# Patient Record
Sex: FEMALE | Race: WHITE | NOT HISPANIC OR LATINO | Employment: OTHER | URBAN - METROPOLITAN AREA
[De-identification: names, ages, dates, MRNs, and addresses within clinical notes are randomized per-mention and may not be internally consistent; named-entity substitution may affect disease eponyms.]

---

## 2017-11-27 ENCOUNTER — TELEPHONE (OUTPATIENT)
Dept: INTERNAL MEDICINE | Facility: CLINIC | Age: 72
End: 2017-11-27

## 2017-11-27 DIAGNOSIS — Z12.39 BREAST CANCER SCREENING: Primary | ICD-10-CM

## 2017-11-28 ENCOUNTER — OFFICE VISIT (OUTPATIENT)
Dept: INTERNAL MEDICINE | Facility: CLINIC | Age: 72
End: 2017-11-28
Attending: INTERNAL MEDICINE
Payer: MEDICARE

## 2017-11-28 VITALS
WEIGHT: 124.75 LBS | HEART RATE: 62 BPM | DIASTOLIC BLOOD PRESSURE: 86 MMHG | OXYGEN SATURATION: 96 % | SYSTOLIC BLOOD PRESSURE: 136 MMHG | HEIGHT: 63 IN | BODY MASS INDEX: 22.11 KG/M2

## 2017-11-28 DIAGNOSIS — Z13.6 ENCOUNTER FOR LIPID SCREENING FOR CARDIOVASCULAR DISEASE: ICD-10-CM

## 2017-11-28 DIAGNOSIS — M85.80 OSTEOPENIA, UNSPECIFIED LOCATION: Primary | ICD-10-CM

## 2017-11-28 DIAGNOSIS — E55.9 VITAMIN D DEFICIENCY: ICD-10-CM

## 2017-11-28 DIAGNOSIS — L65.9 HAIR LOSS: ICD-10-CM

## 2017-11-28 DIAGNOSIS — Z13.220 ENCOUNTER FOR LIPID SCREENING FOR CARDIOVASCULAR DISEASE: ICD-10-CM

## 2017-11-28 DIAGNOSIS — D64.9 ANEMIA, UNSPECIFIED TYPE: ICD-10-CM

## 2017-11-28 DIAGNOSIS — Z23 NEED FOR PNEUMOCOCCAL VACCINATION: ICD-10-CM

## 2017-11-28 PROCEDURE — G0009 ADMIN PNEUMOCOCCAL VACCINE: HCPCS | Mod: S$GLB,,, | Performed by: INTERNAL MEDICINE

## 2017-11-28 PROCEDURE — 99214 OFFICE O/P EST MOD 30 MIN: CPT | Mod: S$GLB,,, | Performed by: INTERNAL MEDICINE

## 2017-11-28 PROCEDURE — 99999 PR PBB SHADOW E&M-EST. PATIENT-LVL III: CPT | Mod: PBBFAC,,, | Performed by: INTERNAL MEDICINE

## 2017-11-28 PROCEDURE — 90670 PCV13 VACCINE IM: CPT | Mod: S$GLB,,, | Performed by: INTERNAL MEDICINE

## 2017-11-28 NOTE — PROGRESS NOTES
"Patient was given vaccine information sheet for the Prbsilg85 (pneumococcal 13-valent conjugate) vaccine. The area of injection was palpated using the acromion process as a landmark. This area was cleaned with alcohol. Using a 25g 1" safety needle, 0.5mL of the vaccine was placed into the left deltoid muscle. The injection site was dressed with a bandage. Patient experienced no complications and was discharged in stable condition. [id number] (pneumococcal 13-valent conjugate) vaccine Lot: [id number] Exp: 09/2018    "

## 2017-11-28 NOTE — PATIENT INSTRUCTIONS
rec over the counter supplement of calcium 1200mg and vit d 1000u divided into 2 doses daily along with reg exercise

## 2017-11-28 NOTE — PROGRESS NOTES
"Subjective:   Patient ID: Nelsy Mckenna is a 72 y.o. female  Chief complaint:   Chief Complaint   Patient presents with    [facility]  Pt here to [facility] care and for annual exam. Prior pcp Dr. Anne-Marie mosher ordered through gyn - Neel Edwards and is utd  Had dexa this year ordered by gyn as well  Not taking calcium or vit d supplement currently but agrees to start. Is getting reg exercise.   No complaints today and doing well overall except notices some mild hair loss over the past few years. No constipation, dry brittle nails or hair, no dry skin or cold intolerance     Review of Systems   Constitutional: Negative for activity change and unexpected weight change.   HENT: Negative for hearing loss, rhinorrhea and trouble swallowing.    Eyes: Negative for discharge and visual disturbance.   Respiratory: Negative for chest tightness and wheezing.    Cardiovascular: Negative for chest pain and palpitations.   Gastrointestinal: Negative for blood in stool, constipation, diarrhea and vomiting.   Endocrine: Negative for polydipsia and polyuria.   Genitourinary: Negative for difficulty urinating, dysuria, hematuria and menstrual problem.   Musculoskeletal: Negative for arthralgias, joint swelling and neck pain.   Skin: Negative for color change and rash.   Neurological: Negative for weakness and headaches.   Psychiatric/Behavioral: Negative for confusion and dysphoric mood.       Objective:  Vitals:    11/28/17 1129   BP: 136/86   Pulse: 62   SpO2: 96%   Weight: 56.6 kg (124 lb 12.5 oz)   Height: 5' 3" (1.6 m)     Body mass index is 22.1 kg/m².    Physical Exam   Constitutional: She is oriented to person, place, and time. She appears well-developed and well-nourished.   HENT:   Head: Normocephalic and atraumatic.   Right Ear: External ear normal.   Left Ear: External ear normal.   Nose: Nose normal.   Mouth/Throat: Oropharynx is clear and moist. No oropharyngeal exudate.   No carotid bruits   Eyes: Conjunctivae " and EOM are normal.   Neck: Neck supple. No thyromegaly present.   Cardiovascular: Normal rate, regular rhythm, normal heart sounds and intact distal pulses.    Pulmonary/Chest: Effort normal and breath sounds normal.   Abdominal: Soft. Bowel sounds are normal.   Musculoskeletal: She exhibits no edema or tenderness.   Lymphadenopathy:     She has no cervical adenopathy.   Neurological: She is alert and oriented to person, place, and time.   Skin: Skin is warm and dry. Capillary refill takes less than 2 seconds.   Psychiatric: Her behavior is normal. Thought content normal.   Vitals reviewed.    Assessment:  1. Osteopenia, unspecified location    2. Vitamin D deficiency    3. Need for pneumococcal vaccination    4. Anemia, unspecified type    5. Encounter for lipid screening for cardiovascular disease    6. Hair loss        Plan:  Nelsy was seen today for establish care.    Diagnoses and all orders for this visit:    Osteopenia, unspecified location  -     Comprehensive metabolic panel; Future  -     Vitamin D; Future  -     TSH; Future  Pt to complete NU to get DEXA from 2017 from gyn   rec otc supplement of calcium 1200mg and vit d 800u divided into 2 doses daily along with reg exercise    Vitamin D deficiency: as above, check level  -     Comprehensive metabolic panel; Future  -     Vitamin D; Future  -     TSH; Future    Need for pneumococcal vaccination  -     (In Office Administered) Pneumococcal Conjugate Vaccine (13 Valent) (IM)    Anemia, unspecified type  -     CBC auto differential; Future  -     TSH; Future  cscope utd.   No gross bleeding - gi or gu    Encounter for lipid screening for cardiovascular disease  -     Lipid panel; Future    Hair loss  -     TSHl; Future    Health Maintenance   Topic Date Due    Pneumococcal (65+) (2 of 2 - PCV13) 10/31/2013    DEXA SCAN  05/05/2018    Colonoscopy  12/04/2018    Mammogram  08/16/2019    Lipid Panel  05/05/2020    TETANUS VACCINE  07/12/2023     Hepatitis C Screening  Completed    Zoster Vaccine  Completed    Influenza Vaccine  Completed

## 2017-12-11 ENCOUNTER — LAB VISIT (OUTPATIENT)
Dept: LAB | Facility: OTHER | Age: 72
End: 2017-12-11
Attending: INTERNAL MEDICINE
Payer: MEDICARE

## 2017-12-11 DIAGNOSIS — D64.9 ANEMIA, UNSPECIFIED TYPE: ICD-10-CM

## 2017-12-11 DIAGNOSIS — M85.80 OSTEOPENIA, UNSPECIFIED LOCATION: ICD-10-CM

## 2017-12-11 DIAGNOSIS — Z13.220 ENCOUNTER FOR LIPID SCREENING FOR CARDIOVASCULAR DISEASE: ICD-10-CM

## 2017-12-11 DIAGNOSIS — Z13.6 ENCOUNTER FOR LIPID SCREENING FOR CARDIOVASCULAR DISEASE: ICD-10-CM

## 2017-12-11 DIAGNOSIS — E55.9 VITAMIN D DEFICIENCY: ICD-10-CM

## 2017-12-11 DIAGNOSIS — L65.9 HAIR LOSS: ICD-10-CM

## 2017-12-11 LAB
25(OH)D3+25(OH)D2 SERPL-MCNC: 21 NG/ML
ALBUMIN SERPL BCP-MCNC: 3.6 G/DL
ALP SERPL-CCNC: 94 U/L
ALT SERPL W/O P-5'-P-CCNC: 14 U/L
ANION GAP SERPL CALC-SCNC: 6 MMOL/L
AST SERPL-CCNC: 21 U/L
BASOPHILS # BLD AUTO: 0.01 K/UL
BASOPHILS NFR BLD: 0.2 %
BILIRUB SERPL-MCNC: 0.6 MG/DL
BUN SERPL-MCNC: 28 MG/DL
CALCIUM SERPL-MCNC: 9.2 MG/DL
CHLORIDE SERPL-SCNC: 109 MMOL/L
CHOLEST SERPL-MCNC: 198 MG/DL
CHOLEST/HDLC SERPL: 2.4 {RATIO}
CO2 SERPL-SCNC: 27 MMOL/L
CREAT SERPL-MCNC: 1.3 MG/DL
DIFFERENTIAL METHOD: ABNORMAL
EOSINOPHIL # BLD AUTO: 0.2 K/UL
EOSINOPHIL NFR BLD: 3.9 %
ERYTHROCYTE [DISTWIDTH] IN BLOOD BY AUTOMATED COUNT: 12.9 %
EST. GFR  (AFRICAN AMERICAN): 48 ML/MIN/1.73 M^2
EST. GFR  (NON AFRICAN AMERICAN): 42 ML/MIN/1.73 M^2
GLUCOSE SERPL-MCNC: 99 MG/DL
HCT VFR BLD AUTO: 37.6 %
HDLC SERPL-MCNC: 82 MG/DL
HDLC SERPL: 41.4 %
HGB BLD-MCNC: 12.3 G/DL
LDLC SERPL CALC-MCNC: 100.2 MG/DL
LYMPHOCYTES # BLD AUTO: 1.1 K/UL
LYMPHOCYTES NFR BLD: 21.2 %
MCH RBC QN AUTO: 31.4 PG
MCHC RBC AUTO-ENTMCNC: 32.7 G/DL
MCV RBC AUTO: 96 FL
MONOCYTES # BLD AUTO: 0.4 K/UL
MONOCYTES NFR BLD: 6.7 %
NEUTROPHILS # BLD AUTO: 3.6 K/UL
NEUTROPHILS NFR BLD: 67.4 %
NONHDLC SERPL-MCNC: 116 MG/DL
PLATELET # BLD AUTO: 203 K/UL
PMV BLD AUTO: 10 FL
POTASSIUM SERPL-SCNC: 4.3 MMOL/L
PROT SERPL-MCNC: 6.9 G/DL
RBC # BLD AUTO: 3.92 M/UL
SODIUM SERPL-SCNC: 142 MMOL/L
TRIGL SERPL-MCNC: 79 MG/DL
TSH SERPL DL<=0.005 MIU/L-ACNC: 2.29 UIU/ML
WBC # BLD AUTO: 5.38 K/UL

## 2017-12-11 PROCEDURE — 36415 COLL VENOUS BLD VENIPUNCTURE: CPT

## 2017-12-11 PROCEDURE — 84443 ASSAY THYROID STIM HORMONE: CPT

## 2017-12-11 PROCEDURE — 85025 COMPLETE CBC W/AUTO DIFF WBC: CPT

## 2017-12-11 PROCEDURE — 80061 LIPID PANEL: CPT

## 2017-12-11 PROCEDURE — 80053 COMPREHEN METABOLIC PANEL: CPT

## 2017-12-11 PROCEDURE — 82306 VITAMIN D 25 HYDROXY: CPT

## 2017-12-13 ENCOUNTER — TELEPHONE (OUTPATIENT)
Dept: INTERNAL MEDICINE | Facility: CLINIC | Age: 72
End: 2017-12-13

## 2017-12-13 DIAGNOSIS — E55.9 VITAMIN D DEFICIENCY: Primary | ICD-10-CM

## 2017-12-13 DIAGNOSIS — N17.9 AKI (ACUTE KIDNEY INJURY): ICD-10-CM

## 2017-12-13 RX ORDER — ERGOCALCIFEROL 1.25 MG/1
50000 CAPSULE ORAL
Qty: 4 CAPSULE | Refills: 2 | Status: SHIPPED | OUTPATIENT
Start: 2017-12-13 | End: 2018-03-01

## 2017-12-13 NOTE — TELEPHONE ENCOUNTER
Message sent to pt via my chart with lab results and updates to plan.   Please schedule vit d lab in 3 months and BMP on Monday, Dec 18th. Thanks!

## 2017-12-13 NOTE — TELEPHONE ENCOUNTER
Nuno w/ pt and scheduled BMP lab for monday .  Pt states that she will call in 3 months to schedule vit D .  Pt has no further questions or concerns

## 2017-12-18 ENCOUNTER — LAB VISIT (OUTPATIENT)
Dept: LAB | Facility: OTHER | Age: 72
End: 2017-12-18
Attending: INTERNAL MEDICINE
Payer: MEDICARE

## 2017-12-18 DIAGNOSIS — N17.9 AKI (ACUTE KIDNEY INJURY): ICD-10-CM

## 2017-12-18 LAB
ANION GAP SERPL CALC-SCNC: 10 MMOL/L
BUN SERPL-MCNC: 24 MG/DL
CALCIUM SERPL-MCNC: 9.5 MG/DL
CHLORIDE SERPL-SCNC: 106 MMOL/L
CO2 SERPL-SCNC: 24 MMOL/L
CREAT SERPL-MCNC: 1 MG/DL
EST. GFR  (AFRICAN AMERICAN): >60 ML/MIN/1.73 M^2
EST. GFR  (NON AFRICAN AMERICAN): 57 ML/MIN/1.73 M^2
GLUCOSE SERPL-MCNC: 87 MG/DL
POTASSIUM SERPL-SCNC: 4.6 MMOL/L
SODIUM SERPL-SCNC: 140 MMOL/L

## 2017-12-18 PROCEDURE — 80048 BASIC METABOLIC PNL TOTAL CA: CPT

## 2017-12-18 PROCEDURE — 36415 COLL VENOUS BLD VENIPUNCTURE: CPT

## 2018-01-11 ENCOUNTER — OFFICE VISIT (OUTPATIENT)
Dept: URGENT CARE | Facility: CLINIC | Age: 73
End: 2018-01-11
Payer: MEDICARE

## 2018-01-11 VITALS
RESPIRATION RATE: 16 BRPM | SYSTOLIC BLOOD PRESSURE: 110 MMHG | TEMPERATURE: 98 F | HEIGHT: 63 IN | DIASTOLIC BLOOD PRESSURE: 80 MMHG | WEIGHT: 125 LBS | BODY MASS INDEX: 22.15 KG/M2 | HEART RATE: 80 BPM | OXYGEN SATURATION: 96 %

## 2018-01-11 DIAGNOSIS — R05.9 COUGH: ICD-10-CM

## 2018-01-11 DIAGNOSIS — R06.2 WHEEZING: ICD-10-CM

## 2018-01-11 DIAGNOSIS — J40 BRONCHITIS: Primary | ICD-10-CM

## 2018-01-11 LAB
CTP QC/QA: YES
FLUAV AG NPH QL: NEGATIVE
FLUBV AG NPH QL: NEGATIVE

## 2018-01-11 PROCEDURE — 99214 OFFICE O/P EST MOD 30 MIN: CPT | Mod: 25,S$GLB,, | Performed by: NURSE PRACTITIONER

## 2018-01-11 PROCEDURE — 87804 INFLUENZA ASSAY W/OPTIC: CPT | Mod: QW,S$GLB,, | Performed by: NURSE PRACTITIONER

## 2018-01-11 PROCEDURE — 94640 AIRWAY INHALATION TREATMENT: CPT | Mod: S$GLB,,, | Performed by: FAMILY MEDICINE

## 2018-01-11 RX ORDER — ALBUTEROL SULFATE 0.83 MG/ML
2.5 SOLUTION RESPIRATORY (INHALATION)
Status: COMPLETED | OUTPATIENT
Start: 2018-01-11 | End: 2018-01-11

## 2018-01-11 RX ORDER — IPRATROPIUM BROMIDE 0.5 MG/2.5ML
0.5 SOLUTION RESPIRATORY (INHALATION)
Status: COMPLETED | OUTPATIENT
Start: 2018-01-11 | End: 2018-01-11

## 2018-01-11 RX ORDER — ALBUTEROL SULFATE 90 UG/1
2 AEROSOL, METERED RESPIRATORY (INHALATION) EVERY 6 HOURS PRN
Qty: 1 INHALER | Refills: 0 | Status: SHIPPED | OUTPATIENT
Start: 2018-01-11 | End: 2018-03-13

## 2018-01-11 RX ADMIN — IPRATROPIUM BROMIDE 0.5 MG: 0.5 SOLUTION RESPIRATORY (INHALATION) at 09:01

## 2018-01-11 RX ADMIN — ALBUTEROL SULFATE 2.5 MG: 0.83 SOLUTION RESPIRATORY (INHALATION) at 09:01

## 2018-01-11 NOTE — PROGRESS NOTES
"Subjective:       Patient ID: Nelsy Mckenna is a 72 y.o. female.    Vitals:  height is 5' 3" (1.6 m) and weight is 56.7 kg (125 lb). Her temperature is 98.4 °F (36.9 °C). Her blood pressure is 110/80 and her pulse is 80. Her respiration is 16 and oxygen saturation is 96%.     Chief Complaint: Cough (dry cough that began 5 days ago)    Pt says she has had a dry cough for 5 days and a slight sore throat. Pt also says her lower back on the right side feels strained and the pain radiates to her pelvis into her groin area. Pt says she has body aches also. Pt took nyquil last night. Pt is a local retired [employer]    Patient also reports she feels "a little short of breath with coughing attacks".  SMR      Cough   This is a new problem. The current episode started in the past 7 days. The problem has been unchanged. The problem occurs hourly. The cough is non-productive. Associated symptoms include myalgias, nasal congestion, postnasal drip and a sore throat. Pertinent negatives include no chills or ear pain. The symptoms are aggravated by lying down. She has tried OTC cough suppressant for the symptoms. The treatment provided mild relief. There is no history of asthma, COPD or pneumonia.     Review of Systems   Constitution: Negative for chills and decreased appetite.   HENT: Positive for congestion, postnasal drip and sore throat. Negative for ear discharge and ear pain.    Respiratory: Positive for cough. Negative for sputum production.    Musculoskeletal: Positive for back pain and myalgias.   Genitourinary: Negative for pelvic pain.   Neurological: Positive for dizziness.       Objective:      Physical Exam   Constitutional: She is oriented to person, place, and time. Vital signs are normal. She appears well-developed and well-nourished. She is cooperative.  Non-toxic appearance. She does not appear ill. No distress.   HENT:   Head: Normocephalic and atraumatic.   Right Ear: Hearing, external ear and ear canal normal. " Tympanic membrane is bulging.   Left Ear: Hearing, external ear and ear canal normal. Tympanic membrane is bulging.   Nose: Mucosal edema present. No rhinorrhea or nasal deformity. No epistaxis. Right sinus exhibits no maxillary sinus tenderness and no frontal sinus tenderness. Left sinus exhibits no maxillary sinus tenderness and no frontal sinus tenderness.   Mouth/Throat: Uvula is midline and mucous membranes are normal. No trismus in the jaw. Normal dentition. No uvula swelling. Posterior oropharyngeal erythema (post nasal drainage noted) present.   Mild clear fluid noted behind both TMs   Eyes: Conjunctivae and lids are normal. No scleral icterus.   Sclera clear bilat   Neck: Trachea normal, normal range of motion, full passive range of motion without pain and phonation normal. Neck supple.   Cardiovascular: Normal rate, regular rhythm, normal heart sounds, intact distal pulses and normal pulses.    Pulmonary/Chest: Effort normal. No respiratory distress. She has wheezes in the right upper field and the left upper field.   Dry cough during exam, breath sounds slightly coarse mild with a very mild wheeze noted bilaterally to the upper lung fields.    Post nebulizer treatment, breath sounds are CTA bilaterally, no rhonchi or wheezing noted; breaths are unlabored and full throughout lung fields.     Abdominal: Soft. Normal appearance and bowel sounds are normal. She exhibits no distension. There is no tenderness.   Musculoskeletal: Normal range of motion. She exhibits no edema or deformity.   Neurological: She is alert and oriented to person, place, and time. She exhibits normal muscle tone. Coordination normal.   Skin: Skin is warm, dry and intact. She is not diaphoretic. No pallor.   Psychiatric: She has a normal mood and affect. Her speech is normal and behavior is normal. Judgment and thought content normal. Cognition and memory are normal.   Nursing note and vitals reviewed.      Type of Interpretation:  Radiology Verbal Report.  Radiology Procedure Done: AP & Lat CXR.  Interpretation:   Findings:  Lung volumes are normal and symmetric.  Mediastinal structures are midline allowing for thoracolumbar scoliosis.    I detect no convincing evidence of pulmonary disease, pleural fluid, lymph node enlargement, cardiac decompensation, pneumothorax, pneumomediastinum, pneumoperitoneum or significant osseous abnormality.  Impression       No convincing evidence of pneumonia or other intrathoracic disease identified in this patient with cough.          Office Visit on 01/11/2018   Component Date Value Ref Range Status    Rapid Influenza A Ag 01/11/2018 Negative  Negative Final    Rapid Influenza B Ag 01/11/2018 Negative  Negative Final     Acceptable 01/11/2018 Yes   Final     Assessment:       1. Bronchitis    2. Cough    3. Wheezing        Plan:         Bronchitis  -     albuterol 90 mcg/actuation inhaler; Inhale 2 puffs into the lungs every 6 (six) hours as needed for Wheezing. Rescue  Dispense: 1 Inhaler; Refill: 0    Cough  -     POCT INFLUENZA A/B  -     X-Ray Chest PA And Lateral; Future; Expected date: 01/11/2018    Wheezing  -     albuterol nebulizer solution 2.5 mg; Take 3 mLs (2.5 mg total) by nebulization one time.  -     ipratropium 0.02 % nebulizer solution 0.5 mg; Take 2.5 mLs (0.5 mg total) by nebulization one time.      Patient Instructions     Bronchitis  If your condition worsens or fails to improve we recommend that you receive another evaluation at the ER immediately or contact your PCP to discuss your concerns or return here. You must understand that you've received an urgent care treatment only and that you may be released before all your medical problems are known or treated. You the patient will arrange for follouwp care as instructed. .  Rest and fluids are important  Can use honey with joyce to soothe your throat  Take inhaler as prescribed and needed for wheezing  -  Flonase  "(fluticasone) is a nasal spray which is available over the counter and may help with your symptoms.   -  Zyrtec D, Claritin D or Allegra D can also help with symptoms of congestion and drainage.   -  If you have hypertension avoid using the "D" which is the decongestant.  Instead you can use Coricidin HBP for cold and cough symptoms.    -  If you just have drainage you can take plain Zyrtec, Claritin or Allegra   -  If you just have a congested feeling you can take pseudoephedrine (unless you have high blood pressure) which you have to sign for behind the counter. Do not buy the phenylephrine which is on the shelf as it is not effective   -  Rest and fluids are also important.   -  Tylenol or ibuprofen can also be used as directed for pain unless you have an allergy to them or medical condition such as stomach ulcers, kidney or liver disease or blood thinners etc for which you should not be taking these type of medications.   Please follow up with your primary care doctor or specialist in the next 48-72hrs as needed and if no improvement  If you  smoke, please stop smoking.                                                              What Is Acute Bronchitis?  Acute bronchitis is when the airways in your lungs (bronchial tubes) become red and swollen (inflamed). It is usually caused by a viral infection. But it can also occur because of a bacteria or allergen. Symptoms include a cough that produces yellow or greenish mucus and can last for days or sometimes weeks.  Inside healthy lungs    Air travels in and out of the lungs through the airways. The linings of these airways produce sticky mucus. This mucus traps particles that enter the lungs. Tiny structures called cilia then sweep the particles out of the airways.     Healthy airway: Airways are normally open. Air moves in and out easily.      Healthy cilia: Tiny, hairlike cilia sweep mucus and particles up and out of the airways.   Lungs with " bronchitis  Bronchitis often occurs with a cold or the flu virus. The airways become inflamed (red and swollen). There is a deep hacking cough from the extra mucus. Other symptoms may include:  · Wheezing  · Chest discomfort  · Shortness of breath  · Mild fever  A second infection, this time due to bacteria, may then occur. And airways irritated by allergens or smoke are more likely to get infected.        Inflamed airway: Inflammation and extra mucus narrow the airway, causing shortness of breath.      Impaired cilia: Extra mucus impairs cilia, causing congestion and wheezing. Smoking makes the problem worse.   Making a diagnosis  A physical exam, health history, and certain tests help your healthcare provider make the diagnosis.  Health history  Your healthcare provider will ask you about your symptoms.  The exam  Your provider listens to your chest for signs of congestion. He or she may also check your ears, nose, and throat.  Possible tests  · A sputum test for bacteria. This requires a sample of mucus from your lungs.  · A nasal or throat swab. This tests to see if you have a bacterial infection.  · A chest X-ray. This is done if your healthcare provider thinks you have pneumonia.  · Tests to check for an underlying condition. Other tests may be done to check for things such as allergies, asthma, or COPD (chronic obstructive pulmonary disease). You may need to see a specialist for more lung function testing.  Treating a cough  The main treatment for bronchitis is easing symptoms. Avoiding smoke, allergens, and other things that trigger coughing can often help. If the infection is bacterial, you may be given antibiotics. During the illness, it's important to get plenty of sleep. To ease symptoms:  · Dont smoke. Also avoid secondhand smoke.  · Use a humidifier. Or try breathing in steam from a hot shower. This may help loosen mucus.  · Drink a lot of water and juice. They can soothe the throat and may help thin  mucus.  · Sit up or use extra pillows when in bed. This helps to lessen coughing and congestion.  · Ask your provider about using medicine. Ask about using cough medicine, pain and fever medicine, or a decongestant.  Antibiotics  Most cases of bronchitis are caused by cold or flu viruses. They dont need antibiotics to treat them, even if your mucus is thick and green or yellow. Antibiotics dont treat viral illness and antibiotics have not been shown to have any benefit in cases of acute bronchitis. Taking antibiotics when they are not needed increases your risk of getting an infection later that is antibiotic-resistant. Antibiotics can also cause severe cases of diarrhea that require other antibiotics to treat.  It is important that you accept your healthcare provider's opinion to not use antibiotics. Your provider will prescribe antibiotics if the infection is caused by bacteria. If they are prescribed:  · Take all of the medicine. Take the medicine until it is used up, even if symptoms have improved. If you dont, the bronchitis may come back.  · Take the medicines as directed. For instance, some medicines should be taken with food.  · Ask about side effects. Ask your provider or pharmacist what side effects are common, and what to do about them.  Follow-up care  You should see your provider again in 2 to 3 weeks. By this time, symptoms should have improved. An infection that lasts longer may mean you have a more serious problem.  Prevention  · Avoid tobacco smoke. If you smoke, quit. Stay away from smoky places. Ask friends and family not to smoke around you, or in your home or car.  · Get checked for allergies.  · Ask your provider about getting a yearly flu shot. Also ask about pneumococcal or pneumonia shots.  · Wash your hands often. This helps reduce the chance of picking up viruses that cause colds and flu.  Call your healthcare provider if:  · Symptoms worsen, or you have new symptoms  · Breathing  problems worsen or  become severe  · Symptoms dont get better within a week, or within 3 days of taking antibiotics   Date Last Reviewed: 2/1/2017 © 2000-2017 [employer]. [address] [place] [address], [place] [address]. All rights reserved. This information is not intended as a substitute for professional medical care. Always follow your healthcare professional's instructions.        Bronchitis with Wheezing (Viral or Bacterial: Adult)    Bronchitis is an infection of the air passages. It often occurs during a cold and is usually caused by a virus. Symptoms include cough with mucus (phlegm) and low-grade fever. This illness is contagious during the first few days and is spread through the air by coughing and sneezing, or by direct contact (touching the sick person and then touching your own eyes, nose, or mouth).  If there is a lot of inflammation, air flow is restricted. The air passages may also go into spasm, especially if you have asthma. This causes wheezing and difficulty breathing even in people who do not have asthma.  Bronchitis usually lasts 7 to 14 days. The wheezing should improve with treatment during the first week. An inhaler is often prescribed to relax the air passages and stop wheezing. Antibiotics will be prescribed if your doctor thinks there is also a secondary bacterial infection.  Home care  · If symptoms are severe, rest at home for the first 2 to 3 days. When you go back to your usual activities, don't let yourself get too tired.  · Do not smoke. Also avoid being exposed to secondhand smoke.  · You may use over-the-counter medicine to control fever or pain, unless another medicine was prescribed. Note: If you have chronic liver or kidney disease or have ever had a stomach ulcer or gastrointestinal bleeding, talk with your healthcare provider before using these medicines. Also talk to your provider if you are taking medicine to prevent blood clots.) Aspirin should never be given to  anyone younger than 18 years of age who is ill with a viral infection or fever. It may cause severe liver or brain damage.  · Your appetite may be poor, so a light diet is fine. Avoid dehydration by drinking 6 to 8 glasses of fluids per day (such as water, soft drinks, sports drinks, juices, tea, or soup). Extra fluids will help loosen secretions in the nose and lungs.  · Over-the-counter cough, cold, and sore-throat medicines will not shorten the length of the illness, but they may be helpful to reduce symptoms. (Note: Do not use decongestants if you have high blood pressure.)  · If you were given an inhaler, use it exactly as directed. If you need to use it more often than prescribed, your condition may be worsening. If this happens, contact your healthcare provider.  · If prescribed, finish all antibiotic medicine, even if you are feeling better after only a few days.  Follow-up care  Follow up with your healthcare provider, or as advised. If you had an X-ray or ECG (electrocardiogram), a specialist will review it. You will be notified of any new findings that may affect your care.  Note: If you are age 65 or older, or if you have a chronic lung disease or condition that affects your immune system, or you smoke, talk to your healthcare provider about having a pneumococcal vaccinations and a yearly influenza vaccination (flu shot).  When to seek medical advice  Call your healthcare provider right away if any of these occur:  · Fever of 100.4°F (38°C) or higher  · Coughing up increasing amounts of colored sputum  · Weakness, drowsiness, headache, facial pain, ear pain, or a stiff neck  Call 911, or get immediate medical care  Contact emergency services right away if any of these occur.  · Coughing up blood  · Worsening weakness, drowsiness, headache, or stiff neck  · Increased wheezing not helped with medication, shortness of breath, or pain with breathing  Date Last Reviewed: 9/13/2015  © 3032-3747 [employer]. [address] [place] [address], [place] [address]. All rights reserved. This information is not intended as a substitute for professional medical care. Always follow your healthcare professional's instructions.

## 2018-01-11 NOTE — PATIENT INSTRUCTIONS
"Bronchitis  If your condition worsens or fails to improve we recommend that you receive another evaluation at the ER immediately or contact your PCP to discuss your concerns or return here. You must understand that you've received an urgent care treatment only and that you may be released before all your medical problems are known or treated. You the patient will arrange for follouwp care as instructed. .  Rest and fluids are important  Can use honey with joyce to soothe your throat  Take inhaler as prescribed and needed for wheezing  -  Flonase (fluticasone) is a nasal spray which is available over the counter and may help with your symptoms.   -  Zyrtec D, Claritin D or Allegra D can also help with symptoms of congestion and drainage.   -  If you have hypertension avoid using the "D" which is the decongestant.  Instead you can use Coricidin HBP for cold and cough symptoms.    -  If you just have drainage you can take plain Zyrtec, Claritin or Allegra   -  If you just have a congested feeling you can take pseudoephedrine (unless you have high blood pressure) which you have to sign for behind the counter. Do not buy the phenylephrine which is on the shelf as it is not effective   -  Rest and fluids are also important.   -  Tylenol or ibuprofen can also be used as directed for pain unless you have an allergy to them or medical condition such as stomach ulcers, kidney or liver disease or blood thinners etc for which you should not be taking these type of medications.   Please follow up with your primary care doctor or specialist in the next 48-72hrs as needed and if no improvement  If you  smoke, please stop smoking.                                                              What Is Acute Bronchitis?  Acute bronchitis is when the airways in your lungs (bronchial tubes) become red and swollen (inflamed). It is usually caused by a viral infection. But it can also occur because of a bacteria or allergen. Symptoms " include a cough that produces yellow or greenish mucus and can last for days or sometimes weeks.  Inside healthy lungs    Air travels in and out of the lungs through the airways. The linings of these airways produce sticky mucus. This mucus traps particles that enter the lungs. Tiny structures called cilia then sweep the particles out of the airways.     Healthy airway: Airways are normally open. Air moves in and out easily.      Healthy cilia: Tiny, hairlike cilia sweep mucus and particles up and out of the airways.   Lungs with bronchitis  Bronchitis often occurs with a cold or the flu virus. The airways become inflamed (red and swollen). There is a deep hacking cough from the extra mucus. Other symptoms may include:  · Wheezing  · Chest discomfort  · Shortness of breath  · Mild fever  A second infection, this time due to bacteria, may then occur. And airways irritated by allergens or smoke are more likely to get infected.        Inflamed airway: Inflammation and extra mucus narrow the airway, causing shortness of breath.      Impaired cilia: Extra mucus impairs cilia, causing congestion and wheezing. Smoking makes the problem worse.   Making a diagnosis  A physical exam, health history, and certain tests help your healthcare provider make the diagnosis.  Health history  Your healthcare provider will ask you about your symptoms.  The exam  Your provider listens to your chest for signs of congestion. He or she may also check your ears, nose, and throat.  Possible tests  · A sputum test for bacteria. This requires a sample of mucus from your lungs.  · A nasal or throat swab. This tests to see if you have a bacterial infection.  · A chest X-ray. This is done if your healthcare provider thinks you have pneumonia.  · Tests to check for an underlying condition. Other tests may be done to check for things such as allergies, asthma, or COPD (chronic obstructive pulmonary disease). You may need to see a specialist for more  lung function testing.  Treating a cough  The main treatment for bronchitis is easing symptoms. Avoiding smoke, allergens, and other things that trigger coughing can often help. If the infection is bacterial, you may be given antibiotics. During the illness, it's important to get plenty of sleep. To ease symptoms:  · Dont smoke. Also avoid secondhand smoke.  · Use a humidifier. Or try breathing in steam from a hot shower. This may help loosen mucus.  · Drink a lot of water and juice. They can soothe the throat and may help thin mucus.  · Sit up or use extra pillows when in bed. This helps to lessen coughing and congestion.  · Ask your provider about using medicine. Ask about using cough medicine, pain and fever medicine, or a decongestant.  Antibiotics  Most cases of bronchitis are caused by cold or flu viruses. They dont need antibiotics to treat them, even if your mucus is thick and green or yellow. Antibiotics dont treat viral illness and antibiotics have not been shown to have any benefit in cases of acute bronchitis. Taking antibiotics when they are not needed increases your risk of getting an infection later that is antibiotic-resistant. Antibiotics can also cause severe cases of diarrhea that require other antibiotics to treat.  It is important that you accept your healthcare provider's opinion to not use antibiotics. Your provider will prescribe antibiotics if the infection is caused by bacteria. If they are prescribed:  · Take all of the medicine. Take the medicine until it is used up, even if symptoms have improved. If you dont, the bronchitis may come back.  · Take the medicines as directed. For instance, some medicines should be taken with food.  · Ask about side effects. Ask your provider or pharmacist what side effects are common, and what to do about them.  Follow-up care  You should see your provider again in 2 to 3 weeks. By this time, symptoms should have improved. An infection that lasts  longer may mean you have a more serious problem.  Prevention  · Avoid tobacco smoke. If you smoke, quit. Stay away from smoky places. Ask friends and family not to smoke around you, or in your home or car.  · Get checked for allergies.  · Ask your provider about getting a yearly flu shot. Also ask about pneumococcal or pneumonia shots.  · Wash your hands often. This helps reduce the chance of picking up viruses that cause colds and flu.  Call your healthcare provider if:  · Symptoms worsen, or you have new symptoms  · Breathing problems worsen or  become severe  · Symptoms dont get better within a week, or within 3 days of taking antibiotics   Date Last Reviewed: 2/1/2017  © 3047-1348 [employer]. [address] [place] [address], [place] [address]. All rights reserved. This information is not intended as a substitute for professional medical care. Always follow your healthcare professional's instructions.        Bronchitis with Wheezing (Viral or Bacterial: Adult)    Bronchitis is an infection of the air passages. It often occurs during a cold and is usually caused by a virus. Symptoms include cough with mucus (phlegm) and low-grade fever. This illness is contagious during the first few days and is spread through the air by coughing and sneezing, or by direct contact (touching the sick person and then touching your own eyes, nose, or mouth).  If there is a lot of inflammation, air flow is restricted. The air passages may also go into spasm, especially if you have asthma. This causes wheezing and difficulty breathing even in people who do not have asthma.  Bronchitis usually lasts 7 to 14 days. The wheezing should improve with treatment during the first week. An inhaler is often prescribed to relax the air passages and stop wheezing. Antibiotics will be prescribed if your doctor thinks there is also a secondary bacterial infection.  Home care  · If symptoms are severe, rest at home for the first 2 to 3 days.  When you go back to your usual activities, don't let yourself get too tired.  · Do not smoke. Also avoid being exposed to secondhand smoke.  · You may use over-the-counter medicine to control fever or pain, unless another medicine was prescribed. Note: If you have chronic liver or kidney disease or have ever had a stomach ulcer or gastrointestinal bleeding, talk with your healthcare provider before using these medicines. Also talk to your provider if you are taking medicine to prevent blood clots.) Aspirin should never be given to anyone younger than 18 years of age who is ill with a viral infection or fever. It may cause severe liver or brain damage.  · Your appetite may be poor, so a light diet is fine. Avoid dehydration by drinking 6 to 8 glasses of fluids per day (such as water, soft drinks, sports drinks, juices, tea, or soup). Extra fluids will help loosen secretions in the nose and lungs.  · Over-the-counter cough, cold, and sore-throat medicines will not shorten the length of the illness, but they may be helpful to reduce symptoms. (Note: Do not use decongestants if you have high blood pressure.)  · If you were given an inhaler, use it exactly as directed. If you need to use it more often than prescribed, your condition may be worsening. If this happens, contact your healthcare provider.  · If prescribed, finish all antibiotic medicine, even if you are feeling better after only a few days.  Follow-up care  Follow up with your healthcare provider, or as advised. If you had an X-ray or ECG (electrocardiogram), a specialist will review it. You will be notified of any new findings that may affect your care.  Note: If you are age 65 or older, or if you have a chronic lung disease or condition that affects your immune system, or you smoke, talk to your healthcare provider about having a pneumococcal vaccinations and a yearly influenza vaccination (flu shot).  When to seek medical advice  Call your healthcare  provider right away if any of these occur:  · Fever of 100.4°F (38°C) or higher  · Coughing up increasing amounts of colored sputum  · Weakness, drowsiness, headache, facial pain, ear pain, or a stiff neck  Call 911, or get immediate medical care  Contact emergency services right away if any of these occur.  · Coughing up blood  · Worsening weakness, drowsiness, headache, or stiff neck  · Increased wheezing not helped with medication, shortness of breath, or pain with breathing  Date Last Reviewed: 9/13/2015  © 2539-9252 [employer]. [address] [place] [address]. All rights reserved. This information is not intended as a substitute for professional medical care. Always follow your healthcare professional's instructions.

## 2018-02-07 ENCOUNTER — OFFICE VISIT (OUTPATIENT)
Dept: OPTOMETRY | Facility: CLINIC | Age: 73
End: 2018-02-07
Payer: MEDICARE

## 2018-02-07 DIAGNOSIS — H52.4 MYOPIA WITH PRESBYOPIA, BILATERAL: ICD-10-CM

## 2018-02-07 DIAGNOSIS — H25.13 NUCLEAR SCLEROSIS, BILATERAL: Primary | ICD-10-CM

## 2018-02-07 DIAGNOSIS — H52.4 MYOPIA WITH PRESBYOPIA, BILATERAL: Primary | ICD-10-CM

## 2018-02-07 DIAGNOSIS — H52.13 MYOPIA WITH PRESBYOPIA, BILATERAL: ICD-10-CM

## 2018-02-07 DIAGNOSIS — H52.13 MYOPIA WITH PRESBYOPIA, BILATERAL: Primary | ICD-10-CM

## 2018-02-07 PROCEDURE — 99499 UNLISTED E&M SERVICE: CPT | Mod: S$GLB,,, | Performed by: OPTOMETRIST

## 2018-02-07 PROCEDURE — 92014 COMPRE OPH EXAM EST PT 1/>: CPT | Mod: S$GLB,,, | Performed by: OPTOMETRIST

## 2018-02-07 PROCEDURE — 99999 PR PBB SHADOW E&M-EST. PATIENT-LVL II: CPT | Mod: PBBFAC,,, | Performed by: OPTOMETRIST

## 2018-02-07 PROCEDURE — 92310 CONTACT LENS FITTING OU: CPT | Mod: ,,, | Performed by: OPTOMETRIST

## 2018-02-07 RX ORDER — TRIAMCINOLONE ACETONIDE 1 MG/G
OINTMENT TOPICAL
COMMUNITY
Start: 2018-01-24 | End: 2018-03-13

## 2018-02-07 RX ORDER — KETOCONAZOLE 20 MG/G
CREAM TOPICAL
COMMUNITY
Start: 2018-01-24 | End: 2018-03-13

## 2018-02-07 NOTE — PROGRESS NOTES
HPI     Patient's last dilated exam was: 8/10/2016    Pt here for routine exam for glasses and ctl rx. C/o blurred vision for   reading and needing more light to see clearly. Patient denies  pain and   double vision. Occasional flash, unsure which eye, has been going on for   years. Sees apprx once every couple months, floaters OU longstanding and   stable.No longer drives at night if she does not have to due to glare,   feels lights scatter or starburst at night    Last edited by Nyasia Vieyra, PCT on 2/7/2018 11:14 AM.   (History)            Assessment /Plan     For exam results, see Encounter Report.    Nuclear sclerosis, bilateral    Myopia with presbyopia, bilateral            1.  Educated on cataracts and affects on vision.  Patient unhappy with vision.  Consult Dr. Schmid for cataract surgery.  2.  Contact lens rx given.  Only fill if waiting on surgery. Eye health normal OU.

## 2018-02-09 ENCOUNTER — TELEPHONE (OUTPATIENT)
Dept: OPTOMETRY | Facility: CLINIC | Age: 73
End: 2018-02-09

## 2018-03-13 ENCOUNTER — OFFICE VISIT (OUTPATIENT)
Dept: OPHTHALMOLOGY | Facility: CLINIC | Age: 73
End: 2018-03-13
Attending: OPHTHALMOLOGY
Payer: MEDICARE

## 2018-03-13 DIAGNOSIS — H25.13 NUCLEAR SCLEROSIS, BILATERAL: Primary | ICD-10-CM

## 2018-03-13 DIAGNOSIS — Z98.890 S/P LASIK (LASER ASSISTED IN SITU KERATOMILEUSIS): ICD-10-CM

## 2018-03-13 PROCEDURE — 99999 PR PBB SHADOW E&M-EST. PATIENT-LVL II: CPT | Mod: PBBFAC,,, | Performed by: OPHTHALMOLOGY

## 2018-03-13 PROCEDURE — 92014 COMPRE OPH EXAM EST PT 1/>: CPT | Mod: S$GLB,,, | Performed by: OPHTHALMOLOGY

## 2018-03-13 NOTE — PROGRESS NOTES
HPI     Referred by Dr Staley    S/p Lasik OU x 15 years ago  No eyedrops    Pt referred by Dr Staley for cataract eval. Pt states blurry vision when the   light isn't bright enough.  Pt states she has trouble driving at night.      Last edited by Pauly Zhao MA on 3/13/2018  3:09 PM.   (History)            Assessment /Plan     For exam results, see Encounter Report.    Nuclear sclerosis, bilateral    S/P LASIK (laser assisted in situ keratomileusis)      Hx lasik with induced corneal cyl OU.  Visually Significant Cataract: Patient reports decreased vision consistent with the clinical amount of lenticular opacity, which reaches the level of visual significance and affects activities of daily living. Risks, benefits, and alternatives to cataract surgery were discussed and the consent reviewed. IOL options were discussed, including ATIOLs and the associated side effects and additional patient cost associated with them.   IOL Selections:   Right eye  IOL: pcboo 16.5 (aiming -1.00 for plano target)     Left eye  IOL: pcboo 16.5  (aiming -1.00 for plano target)    Pt wishes to have either eye done first.  Will monitor for now.

## 2018-03-16 ENCOUNTER — TELEPHONE (OUTPATIENT)
Dept: OPTOMETRY | Facility: CLINIC | Age: 73
End: 2018-03-16

## 2018-06-08 ENCOUNTER — HOSPITAL ENCOUNTER (INPATIENT)
Facility: OTHER | Age: 73
LOS: 1 days | Discharge: HOME | DRG: 604 | End: 2018-06-09
Attending: EMERGENCY MEDICINE | Admitting: HOSPITALIST
Payer: MEDICARE

## 2018-06-08 ENCOUNTER — OFFICE VISIT (OUTPATIENT)
Dept: INTERNAL MEDICINE | Facility: CLINIC | Age: 73
DRG: 604 | End: 2018-06-08
Attending: FAMILY MEDICINE
Payer: MEDICARE

## 2018-06-08 VITALS
HEART RATE: 92 BPM | SYSTOLIC BLOOD PRESSURE: 136 MMHG | OXYGEN SATURATION: 83 % | BODY MASS INDEX: 23.48 KG/M2 | WEIGHT: 132.5 LBS | HEIGHT: 63 IN | DIASTOLIC BLOOD PRESSURE: 78 MMHG

## 2018-06-08 DIAGNOSIS — J96.01 ACUTE HYPOXEMIC RESPIRATORY FAILURE: ICD-10-CM

## 2018-06-08 DIAGNOSIS — R09.02 HYPOXIA: ICD-10-CM

## 2018-06-08 DIAGNOSIS — S20.219A CHEST WALL CONTUSION: ICD-10-CM

## 2018-06-08 DIAGNOSIS — R31.9 HEMATURIA, UNSPECIFIED TYPE: ICD-10-CM

## 2018-06-08 DIAGNOSIS — S20.212D CHEST WALL CONTUSION, LEFT, SUBSEQUENT ENCOUNTER: Primary | ICD-10-CM

## 2018-06-08 DIAGNOSIS — R79.89 ELEVATED LFTS: ICD-10-CM

## 2018-06-08 DIAGNOSIS — R06.00 DYSPNEA, UNSPECIFIED TYPE: ICD-10-CM

## 2018-06-08 DIAGNOSIS — R07.9 CHEST PAIN: ICD-10-CM

## 2018-06-08 DIAGNOSIS — S20.219D CONTUSION OF CHEST WALL, UNSPECIFIED LATERALITY, SUBSEQUENT ENCOUNTER: ICD-10-CM

## 2018-06-08 DIAGNOSIS — J98.11 ATELECTASIS: ICD-10-CM

## 2018-06-08 DIAGNOSIS — S29.9XXD TRAUMA OF CHEST, SUBSEQUENT ENCOUNTER: ICD-10-CM

## 2018-06-08 DIAGNOSIS — Q61.3 POLYCYSTIC KIDNEY DISEASE: Primary | ICD-10-CM

## 2018-06-08 PROBLEM — N30.00 ACUTE CYSTITIS WITHOUT HEMATURIA: Status: ACTIVE | Noted: 2018-06-08

## 2018-06-08 LAB
ALBUMIN SERPL BCP-MCNC: 3.7 G/DL
ALP SERPL-CCNC: 100 U/L
ALT SERPL W/O P-5'-P-CCNC: 21 U/L
ANION GAP SERPL CALC-SCNC: 10 MMOL/L
AST SERPL-CCNC: 33 U/L
BACTERIA #/AREA URNS HPF: ABNORMAL /HPF
BASOPHILS # BLD AUTO: 0.01 K/UL
BASOPHILS NFR BLD: 0.1 %
BILIRUB SERPL-MCNC: 0.6 MG/DL
BILIRUB UR QL STRIP: NEGATIVE
BNP SERPL-MCNC: 24 PG/ML
BUN SERPL-MCNC: 18 MG/DL
CALCIUM SERPL-MCNC: 9.6 MG/DL
CHLORIDE SERPL-SCNC: 104 MMOL/L
CLARITY UR: CLEAR
CO2 SERPL-SCNC: 24 MMOL/L
COLOR UR: YELLOW
CREAT SERPL-MCNC: 1.1 MG/DL
DIFFERENTIAL METHOD: ABNORMAL
EOSINOPHIL # BLD AUTO: 0.1 K/UL
EOSINOPHIL NFR BLD: 1.3 %
ERYTHROCYTE [DISTWIDTH] IN BLOOD BY AUTOMATED COUNT: 12.3 %
EST. GFR  (AFRICAN AMERICAN): 59 ML/MIN/1.73 M^2
EST. GFR  (NON AFRICAN AMERICAN): 51 ML/MIN/1.73 M^2
GLUCOSE SERPL-MCNC: 108 MG/DL
GLUCOSE UR QL STRIP: NEGATIVE
HCT VFR BLD AUTO: 35.4 %
HGB BLD-MCNC: 11.4 G/DL
HGB UR QL STRIP: ABNORMAL
INR PPP: 0.8
KETONES UR QL STRIP: NEGATIVE
LEUKOCYTE ESTERASE UR QL STRIP: ABNORMAL
LYMPHOCYTES # BLD AUTO: 1.3 K/UL
LYMPHOCYTES NFR BLD: 18.2 %
MCH RBC QN AUTO: 31.2 PG
MCHC RBC AUTO-ENTMCNC: 32.2 G/DL
MCV RBC AUTO: 97 FL
MICROSCOPIC COMMENT: ABNORMAL
MONOCYTES # BLD AUTO: 0.4 K/UL
MONOCYTES NFR BLD: 5.4 %
NEUTROPHILS # BLD AUTO: 5.2 K/UL
NEUTROPHILS NFR BLD: 74.6 %
NITRITE UR QL STRIP: NEGATIVE
PH UR STRIP: 6 [PH] (ref 5–8)
PLATELET # BLD AUTO: 135 K/UL
PMV BLD AUTO: 9.6 FL
POTASSIUM SERPL-SCNC: 4.4 MMOL/L
PROT SERPL-MCNC: 6.9 G/DL
PROT UR QL STRIP: NEGATIVE
PROTHROMBIN TIME: 9.4 SEC
RBC # BLD AUTO: 3.65 M/UL
RBC #/AREA URNS HPF: 6 /HPF (ref 0–4)
SODIUM SERPL-SCNC: 138 MMOL/L
SP GR UR STRIP: 1.01 (ref 1–1.03)
SQUAMOUS #/AREA URNS HPF: 3 /HPF
TROPONIN I SERPL DL<=0.01 NG/ML-MCNC: 0.01 NG/ML
URN SPEC COLLECT METH UR: ABNORMAL
UROBILINOGEN UR STRIP-ACNC: NEGATIVE EU/DL
WBC # BLD AUTO: 7.03 K/UL
WBC #/AREA URNS HPF: 73 /HPF (ref 0–5)
WBC CLUMPS URNS QL MICRO: ABNORMAL

## 2018-06-08 PROCEDURE — 81000 URINALYSIS NONAUTO W/SCOPE: CPT

## 2018-06-08 PROCEDURE — 96374 THER/PROPH/DIAG INJ IV PUSH: CPT

## 2018-06-08 PROCEDURE — 85610 PROTHROMBIN TIME: CPT

## 2018-06-08 PROCEDURE — 99285 EMERGENCY DEPT VISIT HI MDM: CPT | Mod: 25

## 2018-06-08 PROCEDURE — 99215 OFFICE O/P EST HI 40 MIN: CPT | Mod: S$GLB,,, | Performed by: FAMILY MEDICINE

## 2018-06-08 PROCEDURE — 63600175 PHARM REV CODE 636 W HCPCS: Performed by: EMERGENCY MEDICINE

## 2018-06-08 PROCEDURE — 94761 N-INVAS EAR/PLS OXIMETRY MLT: CPT

## 2018-06-08 PROCEDURE — 93010 ELECTROCARDIOGRAM REPORT: CPT | Mod: ,,, | Performed by: INTERNAL MEDICINE

## 2018-06-08 PROCEDURE — 94799 UNLISTED PULMONARY SVC/PX: CPT

## 2018-06-08 PROCEDURE — 83880 ASSAY OF NATRIURETIC PEPTIDE: CPT

## 2018-06-08 PROCEDURE — 27000221 HC OXYGEN, UP TO 24 HOURS

## 2018-06-08 PROCEDURE — 96375 TX/PRO/DX INJ NEW DRUG ADDON: CPT

## 2018-06-08 PROCEDURE — 93005 ELECTROCARDIOGRAM TRACING: CPT

## 2018-06-08 PROCEDURE — 80053 COMPREHEN METABOLIC PANEL: CPT

## 2018-06-08 PROCEDURE — 25000003 PHARM REV CODE 250: Performed by: HOSPITALIST

## 2018-06-08 PROCEDURE — 85025 COMPLETE CBC W/AUTO DIFF WBC: CPT

## 2018-06-08 PROCEDURE — 99222 1ST HOSP IP/OBS MODERATE 55: CPT | Mod: AI,,, | Performed by: HOSPITALIST

## 2018-06-08 PROCEDURE — 63600175 PHARM REV CODE 636 W HCPCS: Performed by: HOSPITALIST

## 2018-06-08 PROCEDURE — 11000001 HC ACUTE MED/SURG PRIVATE ROOM

## 2018-06-08 PROCEDURE — 84484 ASSAY OF TROPONIN QUANT: CPT

## 2018-06-08 PROCEDURE — 99999 PR PBB SHADOW E&M-EST. PATIENT-LVL III: CPT | Mod: PBBFAC,,, | Performed by: FAMILY MEDICINE

## 2018-06-08 PROCEDURE — 96376 TX/PRO/DX INJ SAME DRUG ADON: CPT

## 2018-06-08 RX ORDER — CEFTRIAXONE 1 G/1
1 INJECTION, POWDER, FOR SOLUTION INTRAMUSCULAR; INTRAVENOUS
Status: DISCONTINUED | OUTPATIENT
Start: 2018-06-08 | End: 2018-06-08

## 2018-06-08 RX ORDER — HYDROCODONE BITARTRATE AND ACETAMINOPHEN 5; 325 MG/1; MG/1
1 TABLET ORAL EVERY 4 HOURS PRN
Status: DISCONTINUED | OUTPATIENT
Start: 2018-06-08 | End: 2018-06-09 | Stop reason: HOSPADM

## 2018-06-08 RX ORDER — HYDROMORPHONE HYDROCHLORIDE 1 MG/ML
0.2 INJECTION, SOLUTION INTRAMUSCULAR; INTRAVENOUS; SUBCUTANEOUS
Status: DISCONTINUED | OUTPATIENT
Start: 2018-06-08 | End: 2018-06-08

## 2018-06-08 RX ORDER — SODIUM CHLORIDE 0.9 % (FLUSH) 0.9 %
3 SYRINGE (ML) INJECTION
Status: DISCONTINUED | OUTPATIENT
Start: 2018-06-08 | End: 2018-06-09 | Stop reason: HOSPADM

## 2018-06-08 RX ORDER — ONDANSETRON 8 MG/1
8 TABLET, ORALLY DISINTEGRATING ORAL EVERY 8 HOURS PRN
Status: DISCONTINUED | OUTPATIENT
Start: 2018-06-08 | End: 2018-06-09 | Stop reason: HOSPADM

## 2018-06-08 RX ORDER — HYDROCODONE BITARTRATE AND ACETAMINOPHEN 10; 325 MG/1; MG/1
1 TABLET ORAL EVERY 4 HOURS PRN
Status: DISCONTINUED | OUTPATIENT
Start: 2018-06-08 | End: 2018-06-09 | Stop reason: HOSPADM

## 2018-06-08 RX ORDER — HYDROMORPHONE HYDROCHLORIDE 1 MG/ML
0.2 INJECTION, SOLUTION INTRAMUSCULAR; INTRAVENOUS; SUBCUTANEOUS
Status: COMPLETED | OUTPATIENT
Start: 2018-06-08 | End: 2018-06-08

## 2018-06-08 RX ORDER — ENOXAPARIN SODIUM 100 MG/ML
40 INJECTION SUBCUTANEOUS EVERY 24 HOURS
Status: DISCONTINUED | OUTPATIENT
Start: 2018-06-08 | End: 2018-06-09 | Stop reason: HOSPADM

## 2018-06-08 RX ORDER — CEFTRIAXONE 1 G/1
1 INJECTION, POWDER, FOR SOLUTION INTRAMUSCULAR; INTRAVENOUS
Status: DISCONTINUED | OUTPATIENT
Start: 2018-06-09 | End: 2018-06-08

## 2018-06-08 RX ADMIN — HYDROCODONE BITARTRATE AND ACETAMINOPHEN 1 TABLET: 10; 325 TABLET ORAL at 05:06

## 2018-06-08 RX ADMIN — HYDROMORPHONE HYDROCHLORIDE 0.2 MG: 1 INJECTION, SOLUTION INTRAMUSCULAR; INTRAVENOUS; SUBCUTANEOUS at 12:06

## 2018-06-08 RX ADMIN — ENOXAPARIN SODIUM 40 MG: 100 INJECTION SUBCUTANEOUS at 05:06

## 2018-06-08 RX ADMIN — HYDROCODONE BITARTRATE AND ACETAMINOPHEN 1 TABLET: 5; 325 TABLET ORAL at 11:06

## 2018-06-08 RX ADMIN — HYDROMORPHONE HYDROCHLORIDE 0.2 MG: 1 INJECTION, SOLUTION INTRAMUSCULAR; INTRAVENOUS; SUBCUTANEOUS at 02:06

## 2018-06-08 RX ADMIN — CEFTRIAXONE SODIUM 1 G: 1 INJECTION, POWDER, FOR SOLUTION INTRAMUSCULAR; INTRAVENOUS at 02:06

## 2018-06-08 NOTE — ED NOTES
02 turned off while pt is sitting in lobby to re-evaluate 02 sat. Pt sitting in NAD, no SOB, able to speak in full sentences.

## 2018-06-08 NOTE — ED NOTES
DECREASE O2 SATURATION:  Dr. Ramos at BS. Pt lying on stretcher with HOB in upright position. Resp:22. Tachypnea with minimal exertion noted. Pulse Ox:O2 sat=82-83% Dr. Ramos gave verbal order to place patient on oxygen 3L/NC. LPN placed pt on oxygen 3L/NC with O2 sat=97%. Pt 's spouse at BS. Bed locked in low position, side rails up x2 and call light within reach. Will continue to monitor.
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
## 2018-06-08 NOTE — PLAN OF CARE
PCP Dr. Libia Randall confirmed by pt; current in [employer]. Pharmacy of choice is [facility] on [address] [facility] on [address] [facility]. Pt stated she is independent with ADL's and denies use of assistive equipment. Family to provide transportation home upon discharge. No needs identified at present time.     06/08/18 6505   Discharge Assessment   Assessment Type Discharge Planning Assessment   Confirmed/corrected address and phone number on facesheet? Yes   Assessment information obtained from? Patient  (Spouse at bedside)   Expected Length of Stay (days) 2   Communicated expected length of stay with patient/caregiver yes   Prior to hospitilization cognitive status: Alert/Oriented   Prior to hospitalization functional status: Independent   Current cognitive status: Alert/Oriented   Current Functional Status: Independent   Lives With spouse   Able to Return to Prior Arrangements yes   Is patient able to care for self after discharge? Yes   Who are your caregiver(s) and their phone number(s)? Jesica Mckenna [phone]   Patient's perception of discharge disposition home or selfcare   Readmission Within The Last 30 Days no previous admission in last 30 days   Patient currently being followed by outpatient case management? No   Patient currently receives any other outside agency services? No   Equipment Currently Used at Home none   Do you have any problems affording any of your prescribed medications? No   Is the patient taking medications as prescribed? yes   Does the patient have transportation home? Yes   Transportation Available family or friend will provide   Does the patient receive services at the [facility]? No   Discharge Plan A Home with family   Discharge Plan B Home with family
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
## 2018-06-09 VITALS
TEMPERATURE: 97 F | OXYGEN SATURATION: 98 % | BODY MASS INDEX: 23.04 KG/M2 | HEIGHT: 63 IN | RESPIRATION RATE: 17 BRPM | WEIGHT: 130 LBS | DIASTOLIC BLOOD PRESSURE: 57 MMHG | HEART RATE: 69 BPM | SYSTOLIC BLOOD PRESSURE: 118 MMHG

## 2018-06-09 LAB
ANION GAP SERPL CALC-SCNC: 7 MMOL/L
BASOPHILS # BLD AUTO: 0.02 K/UL
BASOPHILS NFR BLD: 0.4 %
BUN SERPL-MCNC: 16 MG/DL
CALCIUM SERPL-MCNC: 9 MG/DL
CHLORIDE SERPL-SCNC: 105 MMOL/L
CO2 SERPL-SCNC: 26 MMOL/L
CREAT SERPL-MCNC: 0.9 MG/DL
DIFFERENTIAL METHOD: ABNORMAL
EOSINOPHIL # BLD AUTO: 0.2 K/UL
EOSINOPHIL NFR BLD: 3.6 %
ERYTHROCYTE [DISTWIDTH] IN BLOOD BY AUTOMATED COUNT: 12.2 %
EST. GFR  (AFRICAN AMERICAN): >60 ML/MIN/1.73 M^2
EST. GFR  (NON AFRICAN AMERICAN): >60 ML/MIN/1.73 M^2
GLUCOSE SERPL-MCNC: 110 MG/DL
HCT VFR BLD AUTO: 31.9 %
HGB BLD-MCNC: 10.2 G/DL
LYMPHOCYTES # BLD AUTO: 1.4 K/UL
LYMPHOCYTES NFR BLD: 24.9 %
MAGNESIUM SERPL-MCNC: 1.9 MG/DL
MCH RBC QN AUTO: 31.1 PG
MCHC RBC AUTO-ENTMCNC: 32 G/DL
MCV RBC AUTO: 97 FL
MONOCYTES # BLD AUTO: 0.5 K/UL
MONOCYTES NFR BLD: 8.1 %
NEUTROPHILS # BLD AUTO: 3.5 K/UL
NEUTROPHILS NFR BLD: 62.6 %
PLATELET # BLD AUTO: 129 K/UL
PMV BLD AUTO: 9.7 FL
POTASSIUM SERPL-SCNC: 4 MMOL/L
RBC # BLD AUTO: 3.28 M/UL
SODIUM SERPL-SCNC: 138 MMOL/L
WBC # BLD AUTO: 5.55 K/UL

## 2018-06-09 PROCEDURE — 83735 ASSAY OF MAGNESIUM: CPT

## 2018-06-09 PROCEDURE — 99238 HOSP IP/OBS DSCHRG MGMT 30/<: CPT | Mod: ,,, | Performed by: HOSPITALIST

## 2018-06-09 PROCEDURE — 25000003 PHARM REV CODE 250: Performed by: HOSPITALIST

## 2018-06-09 PROCEDURE — 36415 COLL VENOUS BLD VENIPUNCTURE: CPT

## 2018-06-09 PROCEDURE — 80048 BASIC METABOLIC PNL TOTAL CA: CPT

## 2018-06-09 PROCEDURE — 85025 COMPLETE CBC W/AUTO DIFF WBC: CPT

## 2018-06-09 PROCEDURE — 63600175 PHARM REV CODE 636 W HCPCS: Performed by: HOSPITALIST

## 2018-06-09 RX ORDER — HYDROCODONE BITARTRATE AND ACETAMINOPHEN 5; 325 MG/1; MG/1
1 TABLET ORAL EVERY 8 HOURS PRN
Qty: 20 TABLET | Refills: 0 | Status: SHIPPED | OUTPATIENT
Start: 2018-06-09 | End: 2018-11-08

## 2018-06-09 RX ADMIN — HYDROCODONE BITARTRATE AND ACETAMINOPHEN 1 TABLET: 10; 325 TABLET ORAL at 03:06

## 2018-06-09 RX ADMIN — CEFTRIAXONE 1 G: 1 INJECTION, SOLUTION INTRAVENOUS at 03:06
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
## 2018-06-09 NOTE — DISCHARGE INSTRUCTIONS
Chest Contusion    A contusion is a bruise to the skin, muscle, or ribs. It may cause pain, tenderness, and swelling. It may turn the skin purple until it heals. Contusions take a few days to a few weeks to heal.  Home care  Follow these guidelines when caring for yourself at home:  · Rest. Dont do any heavy lifting or strenuous activity. Dont do any activity that causes pain.  · Put an ice pack on the injured area. Do this for 20 minutes every 1 to 2 hours the first day. You can make an ice pack by wrapping a plastic bag of ice cubes in a thin towel. Continue to use the ice pack 3 to 4 times a day for the next 2 days. Then use the ice pack as needed to ease pain and swelling.  · After 1 to 2 days you may put a warm compress on the area. Do this for 10 minutes several times a day. A warm compress is a clean cloth thats damp with warm water.  · Hold a pillow to the affected area when you cough. This will help ease pain.  · You may use acetaminophen or ibuprofen to control pain, unless another pain medicine was prescribed. If you have chronic liver or kidney disease, talk with your health care provider before using these medicines. Also talk with your provider if youve had a stomach ulcer or GI bleeding.  Follow-up care  Follow up with your health care provider during the next week, or as advised.  When to seek medical advice  Call your health care provider right away if any of these occur:  · Shortness of breath, difficulty breathing, or breathing fast  · Chest pain gets worse when you breathe  · Severe pain that comes on suddenly or lasts more than an hour  · Dizziness, weakness, or fainting  · New abdominal pain or abdominal pain that gets worse  ·  Fever of 101ºF (38.3ºC) or higher, or as directed by your health care provider  Date Last Reviewed: 2/15/2015  © 5090-7120 [employer]. [address] [place] [address], [place] [address]. All rights reserved. This information is not intended as a substitute  for professional medical care. Always follow your healthcare professional's instructions.          Thank you for choosing [facility] as your Health Care Provider. [facility] strives to provide the best healthcare available to you. In the next few days you may receive a Survey, either by mail or email,  asking you to rate our care that was provided to you during your stay.  Please return the survey to us, as your feedback is important. We aim to meet your expectations of safe, quality health care.    From your [facility] Team.
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
## 2018-06-09 NOTE — HOSPITAL COURSE
The patient performed scheduled incentive spirometry Q1 while awake and oxygen saturations improved to 94% on RA.  The patient responded well to judicious pain medication and will continue upon discharge.  U/A was suspicious for a UTI so the patient was started on Ceftriaxone, but urine cultures were negative at the time of discharge and antibiotics will not be continued.  The patient is instructed to follow up with her PCP within 1-2 weeks to ensure complete resolution of symptoms.

## 2018-06-09 NOTE — PLAN OF CARE
Problem: Patient Care Overview  Goal: Plan of Care Review  Outcome: Ongoing (interventions implemented as appropriate)  Patient free from injuries and fall. Provided adequate rest periods. Vitals WNL. PRN pain medications given. Resting comfotably.
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
## 2018-06-09 NOTE — SUBJECTIVE & OBJECTIVE
Past Medical History:   Diagnosis Date    Cataract     Osteopenia     Vitamin D deficiency        Past Surgical History:   Procedure Laterality Date     SECTION      FACIAL COSMETIC SURGERY      NASAL SEPTUM SURGERY      REFRACTIVE SURGERY Bilateral 1998    TUBAL LIGATION         Review of patient's allergies indicates:  No Known Allergies    No current facility-administered medications on file prior to encounter.      Current Outpatient Prescriptions on File Prior to Encounter   Medication Sig    cyclobenzaprine (FLEXERIL) 10 MG tablet Take 0.5 tablets (5 mg total) by mouth 3 (three) times daily as needed for Muscle spasms. May cause drowiness    HYDROcodone-acetaminophen (NORCO) 5-325 mg per tablet Take 1 tablet by mouth every 4 (four) hours as needed for Pain.    ondansetron (ZOFRAN) 4 MG tablet Take 1 tablet (4 mg total) by mouth every 6 (six) hours.     Family History     Problem Relation (Age of Onset)    Diabetes Mother    Heart disease Father    Hypertension Mother    No Known Problems Sister, Son, Sister, Son, Son    Stroke Mother, Daughter        Social History Main Topics    Smoking status: Former Smoker     Packs/day: 0.25     Types: Cigarettes     Start date: 1967     Quit date: 2007    Smokeless tobacco: Never Used    Alcohol use 4.2 oz/week     7 Glasses of wine per week    Drug use: No    Sexual activity: Yes     Partners: Male      Comment:  to Jesica     Review of Systems   Constitutional: Positive for activity change. Negative for fatigue and fever.   HENT: Negative for congestion, rhinorrhea and sinus pressure.    Respiratory: Positive for cough and shortness of breath.    Cardiovascular: Positive for chest pain.   Gastrointestinal: Negative for abdominal distention, abdominal pain, constipation, diarrhea, nausea and vomiting.   Genitourinary: Negative for dysuria.   Musculoskeletal: Positive for arthralgias and myalgias.   Skin: Positive for color  change.        Mild ecchymosis on right lower leg, right flank, mid forehead and left chest   Neurological: Negative for dizziness, weakness, light-headedness and headaches.   Hematological: Negative for adenopathy.   Psychiatric/Behavioral: Negative for agitation and confusion. The patient is not nervous/anxious.      Objective:     Vital Signs (Most Recent):  Temp: 98.3 °F (36.8 °C) (06/08/18 1645)  Pulse: 81 (06/08/18 1645)  Resp: 16 (06/08/18 1645)  BP: (!) 167/74 (06/08/18 1645)  SpO2: 95 % (06/08/18 1916) Vital Signs (24h Range):  Temp:  [98 °F (36.7 °C)-98.3 °F (36.8 °C)] 98.3 °F (36.8 °C)  Pulse:  [76-98] 81  Resp:  [14-25] 16  SpO2:  [83 %-100 %] 95 %  BP: (121-197)/(58-88) 167/74     Weight: 59 kg (130 lb)  Body mass index is 23.03 kg/m².    Physical Exam   Constitutional: She is oriented to person, place, and time. She appears well-developed and well-nourished. No distress.   HENT:   Head: Normocephalic and atraumatic.   Mouth/Throat: Oropharynx is clear and moist.   Eyes: Conjunctivae are normal.   Neck: Normal range of motion. Neck supple. No thyromegaly present.   Cardiovascular: Normal rate, regular rhythm, normal heart sounds and intact distal pulses.  Exam reveals no gallop and no friction rub.    No murmur heard.  Pulmonary/Chest: No respiratory distress. She has no wheezes.   Shallow respirations and decreased BS in lower lobes bilaterally   Abdominal: Soft. Bowel sounds are normal. She exhibits no distension and no mass. There is no tenderness. There is no rebound and no guarding.   Musculoskeletal: Normal range of motion.   Lymphadenopathy:     She has no cervical adenopathy.   Neurological: She is alert and oriented to person, place, and time. She has normal reflexes.   Skin: Skin is warm and dry. No rash noted. No erythema. No pallor.   Echymosis on right leg and flank   Psychiatric: She has a normal mood and affect. Her behavior is normal. Judgment and thought content normal.         Significant Labs:   CBC:   Recent Labs  Lab 06/08/18  1213   WBC 7.03   HGB 11.4*   HCT 35.4*   *     CMP:   Recent Labs  Lab 06/08/18  1213      K 4.4      CO2 24      BUN 18   CREATININE 1.1   CALCIUM 9.6   PROT 6.9   ALBUMIN 3.7   BILITOT 0.6   ALKPHOS 100   AST 33   ALT 21   ANIONGAP 10   EGFRNONAA 51*     Magnesium: No results for input(s): MG in the last 48 hours.    Significant Imaging: I have reviewed and interpreted all pertinent imaging results/findings within the past 24 hours.

## 2018-06-11 ENCOUNTER — TELEPHONE (OUTPATIENT)
Dept: INTERNAL MEDICINE | Facility: CLINIC | Age: 73
End: 2018-06-11

## 2018-06-11 ENCOUNTER — PATIENT MESSAGE (OUTPATIENT)
Dept: INTERNAL MEDICINE | Facility: CLINIC | Age: 73
End: 2018-06-11

## 2018-06-11 NOTE — TELEPHONE ENCOUNTER
Please schedule pt with me sooner on Friday 6/22 - looks like there are 2 available appt times - yes US and labs were ordered to further eval kidney cysts seen on CT - agree with keeping appts for those tests - and then f/u with me - thanks!

## 2018-06-11 NOTE — TELEPHONE ENCOUNTER
----- Message from Yanely Loredo sent at 6/11/2018 12:19 PM CDT -----  Contact: Self  Pt is calling because she wants Staff to acknowledge that she is scheduled to see Dr. Hernández in Juyl, 2018.  She is requesting Staff contact her for a few questions.    She can be reached at [phone].    Thank you.  4:00 PM  Patient cancelled appointment and rescheduled with Dr. Randall.

## 2018-06-12 ENCOUNTER — PATIENT MESSAGE (OUTPATIENT)
Dept: INTERNAL MEDICINE | Facility: CLINIC | Age: 73
End: 2018-06-12

## 2018-06-19 ENCOUNTER — TELEPHONE (OUTPATIENT)
Dept: PODIATRY | Facility: CLINIC | Age: 73
End: 2018-06-19

## 2018-06-19 ENCOUNTER — HOSPITAL ENCOUNTER (OUTPATIENT)
Dept: RADIOLOGY | Facility: OTHER | Age: 73
Discharge: HOME | End: 2018-06-19
Attending: FAMILY MEDICINE
Payer: MEDICARE

## 2018-06-19 DIAGNOSIS — Q61.3 POLYCYSTIC KIDNEY DISEASE: ICD-10-CM

## 2018-06-19 DIAGNOSIS — R31.9 HEMATURIA, UNSPECIFIED TYPE: ICD-10-CM

## 2018-06-19 DIAGNOSIS — R79.89 ELEVATED LFTS: ICD-10-CM

## 2018-06-19 PROCEDURE — 76770 US EXAM ABDO BACK WALL COMP: CPT | Mod: 26,,, | Performed by: RADIOLOGY

## 2018-06-19 PROCEDURE — 76770 US EXAM ABDO BACK WALL COMP: CPT | Mod: TC

## 2018-06-19 NOTE — TELEPHONE ENCOUNTER
Pt is at her ultrasound and they would like to know is she only getting her kidney or her kidney and liver.    they stated that the orders are enter wrong    US Retroperitoneal Complete (Kidney   is the correct order she stated i sent a message in your inbasket.      chip at [facility] ext [id number] stated pt have left and went to labs she stated that when pt arrive she just going to get pictures of her kidney if there no response once pt arrive.    Correct orders have been enter

## 2018-06-19 NOTE — TELEPHONE ENCOUNTER
----- Message from Aris Burgess sent at 6/19/2018  9:35 AM CDT -----  Contact: Charles from [facility]            Name of Who is Calling: Charles from [facility]      What is the request in detail: Called to verify ultrasound order    Can the clinic reply by [facility]: No      What Number to Call Back if not in [facility]: [phone]

## 2018-06-20 ENCOUNTER — PATIENT MESSAGE (OUTPATIENT)
Dept: INTERNAL MEDICINE | Facility: CLINIC | Age: 73
End: 2018-06-20

## 2018-06-20 DIAGNOSIS — R31.9 HEMATURIA, UNSPECIFIED TYPE: ICD-10-CM

## 2018-06-20 DIAGNOSIS — N28.1 BILATERAL RENAL CYSTS: Primary | ICD-10-CM

## 2018-06-21 NOTE — TELEPHONE ENCOUNTER
Ultrasound positive for cysts on the kidneys and we will schedule urology follow up for further work up and monitoring of these cysts  Please schedule

## 2018-06-22 ENCOUNTER — OFFICE VISIT (OUTPATIENT)
Dept: INTERNAL MEDICINE | Facility: CLINIC | Age: 73
End: 2018-06-22
Attending: INTERNAL MEDICINE
Payer: MEDICARE

## 2018-06-22 VITALS
HEART RATE: 74 BPM | SYSTOLIC BLOOD PRESSURE: 131 MMHG | HEIGHT: 63 IN | OXYGEN SATURATION: 96 % | DIASTOLIC BLOOD PRESSURE: 78 MMHG | WEIGHT: 127.63 LBS | BODY MASS INDEX: 22.61 KG/M2

## 2018-06-22 DIAGNOSIS — Q61.3 PCK (POLYCYSTIC KIDNEY DISEASE): ICD-10-CM

## 2018-06-22 DIAGNOSIS — R93.5 ABNORMAL CT OF THE ABDOMEN: Primary | ICD-10-CM

## 2018-06-22 PROBLEM — N30.00 ACUTE CYSTITIS WITHOUT HEMATURIA: Status: RESOLVED | Noted: 2018-06-08 | Resolved: 2018-06-22

## 2018-06-22 PROBLEM — J96.01 ACUTE HYPOXEMIC RESPIRATORY FAILURE: Status: RESOLVED | Noted: 2018-06-08 | Resolved: 2018-06-22

## 2018-06-22 PROCEDURE — 99214 OFFICE O/P EST MOD 30 MIN: CPT | Mod: S$GLB,,, | Performed by: INTERNAL MEDICINE

## 2018-06-22 PROCEDURE — 99999 PR PBB SHADOW E&M-EST. PATIENT-LVL IV: CPT | Mod: PBBFAC,,, | Performed by: INTERNAL MEDICINE
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
## 2018-07-09 ENCOUNTER — HOSPITAL ENCOUNTER (OUTPATIENT)
Dept: RADIOLOGY | Facility: OTHER | Age: 73
Discharge: HOME | End: 2018-07-09
Attending: INTERNAL MEDICINE
Payer: MEDICARE

## 2018-07-09 DIAGNOSIS — R93.5 ABNORMAL CT OF THE ABDOMEN: ICD-10-CM

## 2018-07-09 PROCEDURE — 76705 ECHO EXAM OF ABDOMEN: CPT | Mod: TC

## 2018-07-09 PROCEDURE — 76705 ECHO EXAM OF ABDOMEN: CPT | Mod: 26,,, | Performed by: RADIOLOGY

## 2018-07-16 ENCOUNTER — OFFICE VISIT (OUTPATIENT)
Dept: UROLOGY | Facility: CLINIC | Age: 73
End: 2018-07-16
Payer: MEDICARE

## 2018-07-16 VITALS
DIASTOLIC BLOOD PRESSURE: 69 MMHG | WEIGHT: 129 LBS | HEIGHT: 63 IN | HEART RATE: 58 BPM | SYSTOLIC BLOOD PRESSURE: 140 MMHG | BODY MASS INDEX: 22.86 KG/M2

## 2018-07-16 DIAGNOSIS — N20.0 KIDNEY STONE: ICD-10-CM

## 2018-07-16 DIAGNOSIS — N28.1 ACQUIRED BILATERAL RENAL CYSTS: ICD-10-CM

## 2018-07-16 PROCEDURE — 99999 PR PBB SHADOW E&M-EST. PATIENT-LVL III: CPT | Mod: PBBFAC,,, | Performed by: UROLOGY

## 2018-07-16 PROCEDURE — 99204 OFFICE O/P NEW MOD 45 MIN: CPT | Mod: S$GLB,,, | Performed by: UROLOGY

## 2018-07-16 NOTE — LETTER
July 16, 2018      Sarah Hernández MD  [address]  [facility] [address]           [place] - Urology  [address] [facility]  [place] [address]  Phone: [phone]          Patient: Nelsy Mckenna   MR Number: [id number]   YOB: 1945   Date of Visit: 7/16/2018       Dear Dr. Sarah Hernández:    Thank you for referring Nelsy Mckenna to me for evaluation. Attached you will find relevant portions of my assessment and plan of care.    If you have questions, please do not hesitate to call me. I look forward to following Nelsy Mckenna along with you.    Sincerely,    Shahram Barrios MD    Enclosure  CC:  No Recipients    If you would like to receive this communication electronically, please contact [email or web address] or [phone] to request more information on [employer] Link access.    For providers and/or their staff who would like to refer a patient to [facility], please contact us through our one-stop-shop provider referral line, [facility], at [phone].    If you feel you have received this communication in error or would no longer like to receive these types of communications, please e-mail [email or web address]
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
## 2018-07-26 ENCOUNTER — OFFICE VISIT (OUTPATIENT)
Dept: NEPHROLOGY | Facility: CLINIC | Age: 73
End: 2018-07-26
Payer: MEDICARE

## 2018-07-26 ENCOUNTER — LAB VISIT (OUTPATIENT)
Dept: LAB | Facility: HOSPITAL | Age: 73
End: 2018-07-26
Attending: INTERNAL MEDICINE
Payer: MEDICARE

## 2018-07-26 VITALS
SYSTOLIC BLOOD PRESSURE: 148 MMHG | OXYGEN SATURATION: 98 % | HEIGHT: 63 IN | HEART RATE: 56 BPM | WEIGHT: 127 LBS | BODY MASS INDEX: 22.5 KG/M2 | DIASTOLIC BLOOD PRESSURE: 80 MMHG

## 2018-07-26 DIAGNOSIS — N20.0 KIDNEY STONE: ICD-10-CM

## 2018-07-26 DIAGNOSIS — N28.1 ACQUIRED BILATERAL RENAL CYSTS: ICD-10-CM

## 2018-07-26 DIAGNOSIS — N20.0 KIDNEY STONE: Primary | ICD-10-CM

## 2018-07-26 LAB
BACTERIA #/AREA URNS AUTO: ABNORMAL /HPF
BILIRUB UR QL STRIP: NEGATIVE
CLARITY UR REFRACT.AUTO: CLEAR
COLOR UR AUTO: YELLOW
CREAT UR-MCNC: 31 MG/DL
GLUCOSE UR QL STRIP: NEGATIVE
HGB UR QL STRIP: NEGATIVE
KETONES UR QL STRIP: NEGATIVE
LEUKOCYTE ESTERASE UR QL STRIP: ABNORMAL
MICROSCOPIC COMMENT: ABNORMAL
NITRITE UR QL STRIP: NEGATIVE
PH UR STRIP: 6 [PH] (ref 5–8)
PROT UR QL STRIP: NEGATIVE
PROT UR-MCNC: <7 MG/DL
PROT/CREAT RATIO, UR: NORMAL
SP GR UR STRIP: 1.01 (ref 1–1.03)
SQUAMOUS #/AREA URNS AUTO: 1 /HPF
URN SPEC COLLECT METH UR: ABNORMAL
UROBILINOGEN UR STRIP-ACNC: NEGATIVE EU/DL
WBC #/AREA URNS AUTO: 7 /HPF (ref 0–5)

## 2018-07-26 PROCEDURE — 99499 UNLISTED E&M SERVICE: CPT | Mod: S$GLB,,, | Performed by: INTERNAL MEDICINE

## 2018-07-26 PROCEDURE — 99204 OFFICE O/P NEW MOD 45 MIN: CPT | Mod: S$GLB,,, | Performed by: INTERNAL MEDICINE

## 2018-07-26 PROCEDURE — 82570 ASSAY OF URINE CREATININE: CPT

## 2018-07-26 PROCEDURE — 99999 PR PBB SHADOW E&M-EST. PATIENT-LVL III: CPT | Mod: PBBFAC,,, | Performed by: INTERNAL MEDICINE

## 2018-07-26 PROCEDURE — 81001 URINALYSIS AUTO W/SCOPE: CPT
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
## 2018-07-26 NOTE — LETTER
July 30, 2018      Shahram Barrios MD  [address]  [facility] [address]           [facility]  [address]  [facility] [address]  Phone: [phone]  Fax: [phone]          Patient: Nelsy Mckenna   MR Number: [id number]   YOB: 1945   Date of Visit: 7/26/2018       Dear Dr. Shahram Barrios:    Thank you for referring Nelsy Mckenna to me for evaluation. Attached you will find relevant portions of my assessment and plan of care.    If you have questions, please do not hesitate to call me. I look forward to following Nelsy Mckenna along with you.    Sincerely,    Mauro Mccarthy MD    Enclosure  CC:  No Recipients    If you would like to receive this communication electronically, please contact [email or web address] or [phone] to request more information on [employer] Link access.    For providers and/or their staff who would like to refer a patient to [facility], please contact us through our one-stop-shop provider referral line, [facility], at [phone].    If you feel you have received this communication in error or would no longer like to receive these types of communications, please e-mail [email or web address]

## 2018-07-27 ENCOUNTER — PATIENT MESSAGE (OUTPATIENT)
Dept: NEPHROLOGY | Facility: CLINIC | Age: 73
End: 2018-07-27

## 2018-07-31 ENCOUNTER — LAB VISIT (OUTPATIENT)
Dept: LAB | Facility: HOSPITAL | Age: 73
End: 2018-07-31
Attending: INTERNAL MEDICINE
Payer: MEDICARE

## 2018-07-31 DIAGNOSIS — N20.0 KIDNEY STONE: ICD-10-CM

## 2018-07-31 PROCEDURE — 82507 ASSAY OF CITRATE: CPT

## 2018-07-31 PROCEDURE — 84300 ASSAY OF URINE SODIUM: CPT

## 2018-08-08 ENCOUNTER — PATIENT MESSAGE (OUTPATIENT)
Dept: NEPHROLOGY | Facility: CLINIC | Age: 73
End: 2018-08-08

## 2018-08-08 LAB — STONE ANALYSIS-IMP: NORMAL

## 2018-11-05 ENCOUNTER — LAB VISIT (OUTPATIENT)
Dept: LAB | Facility: OTHER | Age: 73
End: 2018-11-05
Attending: INTERNAL MEDICINE
Payer: MEDICARE

## 2018-11-05 DIAGNOSIS — N20.0 KIDNEY STONE: ICD-10-CM

## 2018-11-05 LAB
ALBUMIN SERPL BCP-MCNC: 3.8 G/DL
ANION GAP SERPL CALC-SCNC: 10 MMOL/L
BUN SERPL-MCNC: 23 MG/DL
CALCIUM SERPL-MCNC: 9.9 MG/DL
CHLORIDE SERPL-SCNC: 108 MMOL/L
CO2 SERPL-SCNC: 24 MMOL/L
CREAT SERPL-MCNC: 1 MG/DL
EST. GFR  (AFRICAN AMERICAN): >60 ML/MIN/1.73 M^2
EST. GFR  (NON AFRICAN AMERICAN): 57 ML/MIN/1.73 M^2
GLUCOSE SERPL-MCNC: 97 MG/DL
PHOSPHATE SERPL-MCNC: 2.6 MG/DL
POTASSIUM SERPL-SCNC: 4.1 MMOL/L
PTH-INTACT SERPL-MCNC: 110.1 PG/ML
SODIUM SERPL-SCNC: 142 MMOL/L
URATE SERPL-MCNC: 6.3 MG/DL

## 2018-11-05 PROCEDURE — 36415 COLL VENOUS BLD VENIPUNCTURE: CPT

## 2018-11-05 PROCEDURE — 84550 ASSAY OF BLOOD/URIC ACID: CPT

## 2018-11-05 PROCEDURE — 83970 ASSAY OF PARATHORMONE: CPT

## 2018-11-05 PROCEDURE — 80069 RENAL FUNCTION PANEL: CPT

## 2018-11-08 ENCOUNTER — OFFICE VISIT (OUTPATIENT)
Dept: NEPHROLOGY | Facility: CLINIC | Age: 73
End: 2018-11-08
Payer: MEDICARE

## 2018-11-08 VITALS
SYSTOLIC BLOOD PRESSURE: 125 MMHG | RESPIRATION RATE: 18 BRPM | OXYGEN SATURATION: 95 % | WEIGHT: 120.81 LBS | BODY MASS INDEX: 21.41 KG/M2 | HEART RATE: 65 BPM | HEIGHT: 63 IN | DIASTOLIC BLOOD PRESSURE: 74 MMHG

## 2018-11-08 DIAGNOSIS — N20.0 KIDNEY STONE: Primary | ICD-10-CM

## 2018-11-08 DIAGNOSIS — N28.1 ACQUIRED BILATERAL RENAL CYSTS: ICD-10-CM

## 2018-11-08 PROCEDURE — 99999 PR PBB SHADOW E&M-EST. PATIENT-LVL III: CPT | Mod: PBBFAC,,, | Performed by: INTERNAL MEDICINE

## 2018-11-08 PROCEDURE — 99214 OFFICE O/P EST MOD 30 MIN: CPT | Mod: S$GLB,,, | Performed by: INTERNAL MEDICINE

## 2018-11-08 PROCEDURE — 1101F PT FALLS ASSESS-DOCD LE1/YR: CPT | Mod: CPTII,S$GLB,, | Performed by: INTERNAL MEDICINE
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
## 2018-12-12 ENCOUNTER — OFFICE VISIT (OUTPATIENT)
Dept: INTERNAL MEDICINE | Facility: CLINIC | Age: 73
End: 2018-12-12
Attending: INTERNAL MEDICINE
Payer: MEDICARE

## 2018-12-12 VITALS
OXYGEN SATURATION: 97 % | BODY MASS INDEX: 21.79 KG/M2 | SYSTOLIC BLOOD PRESSURE: 130 MMHG | HEART RATE: 69 BPM | DIASTOLIC BLOOD PRESSURE: 77 MMHG | WEIGHT: 123 LBS | HEIGHT: 63 IN

## 2018-12-12 DIAGNOSIS — D64.9 ANEMIA, UNSPECIFIED TYPE: ICD-10-CM

## 2018-12-12 DIAGNOSIS — E55.9 VITAMIN D DEFICIENCY: ICD-10-CM

## 2018-12-12 DIAGNOSIS — Z13.6 ENCOUNTER FOR LIPID SCREENING FOR CARDIOVASCULAR DISEASE: ICD-10-CM

## 2018-12-12 DIAGNOSIS — Q61.3 PCK (POLYCYSTIC KIDNEY DISEASE): ICD-10-CM

## 2018-12-12 DIAGNOSIS — M85.80 OSTEOPENIA, UNSPECIFIED LOCATION: Primary | ICD-10-CM

## 2018-12-12 DIAGNOSIS — Z13.220 ENCOUNTER FOR LIPID SCREENING FOR CARDIOVASCULAR DISEASE: ICD-10-CM

## 2018-12-12 DIAGNOSIS — Z12.11 SCREENING FOR COLON CANCER: ICD-10-CM

## 2018-12-12 PROBLEM — S20.212D: Status: RESOLVED | Noted: 2018-06-08 | Resolved: 2018-12-12

## 2018-12-12 PROBLEM — J98.11 ATELECTASIS: Status: RESOLVED | Noted: 2018-06-08 | Resolved: 2018-12-12

## 2018-12-12 PROCEDURE — 1101F PT FALLS ASSESS-DOCD LE1/YR: CPT | Mod: CPTII,S$GLB,, | Performed by: INTERNAL MEDICINE

## 2018-12-12 PROCEDURE — 99999 PR PBB SHADOW E&M-EST. PATIENT-LVL IV: CPT | Mod: PBBFAC,,, | Performed by: INTERNAL MEDICINE

## 2018-12-12 PROCEDURE — 99214 OFFICE O/P EST MOD 30 MIN: CPT | Mod: S$GLB,,, | Performed by: INTERNAL MEDICINE

## 2018-12-12 RX ORDER — ACETAMINOPHEN 500 MG
1 TABLET ORAL DAILY
COMMUNITY
Start: 2018-12-12 | End: 2019-01-15

## 2018-12-12 NOTE — PROGRESS NOTES
"Subjective:   Patient ID: Nelsy Mckenna is a 73 y.o. female  Chief complaint:   Chief Complaint   Patient presents with    Annual Exam       HPI   Pt here for annual exam     Hx of renal cysts, stones and CKD  Seen by nephrology and urology for cyts - plan is to f/u with urology in 1 year     mmg ordered through gyn - Neel Edwards and is utd  Had dexa in 2017 ordered by gyn as well    Elevated BP: bought home cuff and readings were 120s/70s and stopped checking after a few weeks   - repaet bp improved upon repeat     Vit d def: not taking vit d at this time - did complete the Rx     cscope - due  Shingles: shingrix    Not utd on eye exam - wears one contact -     Review of Systems    Objective:  Vitals:    12/12/18 1353 12/12/18 1418   BP: (!) 143/70 130/77   Pulse: 69    SpO2: 97%    Weight: 55.8 kg (123 lb 0.3 oz)    Height: 5' 3" (1.6 m)      Body mass index is 21.79 kg/m².    Physical Exam   Constitutional: She is oriented to person, place, and time. She appears well-developed and well-nourished.   HENT:   Head: Normocephalic and atraumatic.   Right Ear: External ear normal.   Left Ear: External ear normal.   Nose: Nose normal.   Mouth/Throat: Oropharynx is clear and moist. No oropharyngeal exudate.   Eyes: Conjunctivae and EOM are normal.   Neck: Neck supple. No thyromegaly present.   Cardiovascular: Normal rate, regular rhythm, normal heart sounds and intact distal pulses.   Pulmonary/Chest: Effort normal and breath sounds normal.   Abdominal: Soft. Bowel sounds are normal.   Musculoskeletal: She exhibits no edema or tenderness.   Lymphadenopathy:     She has no cervical adenopathy.   Neurological: She is alert and oriented to person, place, and time.   Skin: Skin is warm and dry.   Psychiatric: Her behavior is normal. Thought content normal.   Vitals reviewed.      Assessment:  1. Anemia, unspecified type    2. Vitamin D deficiency    3. Encounter for lipid screening for cardiovascular disease    4. Osteopenia, " unspecified location    5. Screening for colon cancer    6. PCK (polycystic kidney disease)        Plan:  Nelsy was seen today for annual exam.    Diagnoses and all orders for this visit:    Anemia, unspecified type  -     CBC auto differential; Future  -     TSH; Future    Vitamin D deficiency  -     Vitamin D; Future  -     cholecalciferol, vitamin D3, (VITAMIN D3) 2,000 unit Cap; Take 1 capsule (2,000 Units total) by mouth once daily.  Stable - start daily supp   check level     Encounter for lipid screening for cardiovascular disease  -     TSH; Future  -     Lipid panel; Future    Osteopenia, unspecified location  rec otc supplement of calcium 1200mg and vit d 800u divided into 2 doses daily along with reg exercise    Screening for colon cancer  -     Case request GI: COLONOSCOPY    PKD; followed by urology  Health Maintenance   Topic Date Due    Colonoscopy  12/04/2018    DEXA SCAN  07/10/2020    Mammogram  11/27/2020    Lipid Panel  12/11/2022    TETANUS VACCINE  07/12/2023    Hepatitis C Screening  Completed    Zoster Vaccine  Completed    Pneumococcal Vaccine (65+ Low/Medium Risk)  Completed    Influenza Vaccine  Completed

## 2018-12-17 ENCOUNTER — TELEPHONE (OUTPATIENT)
Dept: ENDOSCOPY | Facility: HOSPITAL | Age: 73
End: 2018-12-17

## 2018-12-17 DIAGNOSIS — Z12.11 SPECIAL SCREENING FOR MALIGNANT NEOPLASMS, COLON: Primary | ICD-10-CM

## 2018-12-17 RX ORDER — SODIUM, POTASSIUM,MAG SULFATES 17.5-3.13G
1 SOLUTION, RECONSTITUTED, ORAL ORAL ONCE
Qty: 1 BOTTLE | Refills: 0 | Status: SHIPPED | OUTPATIENT
Start: 2018-12-17 | End: 2018-12-17

## 2018-12-18 ENCOUNTER — LAB VISIT (OUTPATIENT)
Dept: LAB | Facility: OTHER | Age: 73
End: 2018-12-18
Attending: INTERNAL MEDICINE
Payer: MEDICARE

## 2018-12-18 DIAGNOSIS — Z13.6 ENCOUNTER FOR LIPID SCREENING FOR CARDIOVASCULAR DISEASE: ICD-10-CM

## 2018-12-18 DIAGNOSIS — E55.9 VITAMIN D DEFICIENCY: ICD-10-CM

## 2018-12-18 DIAGNOSIS — Z13.220 ENCOUNTER FOR LIPID SCREENING FOR CARDIOVASCULAR DISEASE: ICD-10-CM

## 2018-12-18 DIAGNOSIS — D64.9 ANEMIA, UNSPECIFIED TYPE: ICD-10-CM

## 2018-12-18 LAB
25(OH)D3+25(OH)D2 SERPL-MCNC: 18 NG/ML
BASOPHILS # BLD AUTO: 0.03 K/UL
BASOPHILS NFR BLD: 0.6 %
CHOLEST SERPL-MCNC: 189 MG/DL
CHOLEST/HDLC SERPL: 2.6 {RATIO}
DIFFERENTIAL METHOD: ABNORMAL
EOSINOPHIL # BLD AUTO: 0.2 K/UL
EOSINOPHIL NFR BLD: 4 %
ERYTHROCYTE [DISTWIDTH] IN BLOOD BY AUTOMATED COUNT: 12.9 %
HCT VFR BLD AUTO: 38.6 %
HDLC SERPL-MCNC: 74 MG/DL
HDLC SERPL: 39.2 %
HGB BLD-MCNC: 12.3 G/DL
LDLC SERPL CALC-MCNC: 101.8 MG/DL
LYMPHOCYTES # BLD AUTO: 1.2 K/UL
LYMPHOCYTES NFR BLD: 22.2 %
MCH RBC QN AUTO: 30.6 PG
MCHC RBC AUTO-ENTMCNC: 31.9 G/DL
MCV RBC AUTO: 96 FL
MONOCYTES # BLD AUTO: 0.4 K/UL
MONOCYTES NFR BLD: 7.8 %
NEUTROPHILS # BLD AUTO: 3.4 K/UL
NEUTROPHILS NFR BLD: 64.8 %
NONHDLC SERPL-MCNC: 115 MG/DL
PLATELET # BLD AUTO: 200 K/UL
PMV BLD AUTO: 10.4 FL
RBC # BLD AUTO: 4.02 M/UL
TRIGL SERPL-MCNC: 66 MG/DL
TSH SERPL DL<=0.005 MIU/L-ACNC: 2.69 UIU/ML
WBC # BLD AUTO: 5.28 K/UL

## 2018-12-18 PROCEDURE — 85025 COMPLETE CBC W/AUTO DIFF WBC: CPT

## 2018-12-18 PROCEDURE — 80061 LIPID PANEL: CPT

## 2018-12-18 PROCEDURE — 36415 COLL VENOUS BLD VENIPUNCTURE: CPT

## 2018-12-18 PROCEDURE — 84443 ASSAY THYROID STIM HORMONE: CPT

## 2018-12-18 PROCEDURE — 82306 VITAMIN D 25 HYDROXY: CPT

## 2018-12-20 ENCOUNTER — TELEPHONE (OUTPATIENT)
Dept: INTERNAL MEDICINE | Facility: CLINIC | Age: 73
End: 2018-12-20

## 2018-12-20 DIAGNOSIS — E55.9 VITAMIN D DEFICIENCY: Primary | ICD-10-CM

## 2018-12-20 RX ORDER — ERGOCALCIFEROL 1.25 MG/1
50000 CAPSULE ORAL
Qty: 12 CAPSULE | Refills: 0 | Status: SHIPPED | OUTPATIENT
Start: 2018-12-20 | End: 2019-07-19

## 2018-12-20 RX ORDER — SODIUM, POTASSIUM,MAG SULFATES 17.5-3.13G
SOLUTION, RECONSTITUTED, ORAL ORAL
COMMUNITY
Start: 2018-12-17 | End: 2019-01-15

## 2018-12-20 NOTE — TELEPHONE ENCOUNTER
Message sent to pt via my chart with lab results and updates to plan.     Please arrange vit d level in 3 months

## 2019-01-15 ENCOUNTER — OFFICE VISIT (OUTPATIENT)
Dept: OPTOMETRY | Facility: CLINIC | Age: 74
End: 2019-01-15
Payer: MEDICARE

## 2019-01-15 ENCOUNTER — OFFICE VISIT (OUTPATIENT)
Dept: OPTOMETRY | Facility: CLINIC | Age: 74
End: 2019-01-15

## 2019-01-15 DIAGNOSIS — H53.15 VISUAL DISTORTIONS OF SHAPE AND SIZE: ICD-10-CM

## 2019-01-15 DIAGNOSIS — H52.4 MYOPIA WITH PRESBYOPIA, BILATERAL: ICD-10-CM

## 2019-01-15 DIAGNOSIS — H57.89 REDNESS OF LEFT EYE: ICD-10-CM

## 2019-01-15 DIAGNOSIS — H25.13 NUCLEAR SCLEROSIS, BILATERAL: Primary | ICD-10-CM

## 2019-01-15 DIAGNOSIS — H52.13 MYOPIA WITH PRESBYOPIA, BILATERAL: Primary | ICD-10-CM

## 2019-01-15 DIAGNOSIS — H52.4 MYOPIA WITH PRESBYOPIA, BILATERAL: Primary | ICD-10-CM

## 2019-01-15 DIAGNOSIS — H52.13 MYOPIA WITH PRESBYOPIA, BILATERAL: ICD-10-CM

## 2019-01-15 PROCEDURE — 92134 CPTRZ OPH DX IMG PST SGM RTA: CPT | Mod: S$GLB,,, | Performed by: OPTOMETRIST

## 2019-01-15 PROCEDURE — 99999 PR PBB SHADOW E&M-EST. PATIENT-LVL II: ICD-10-PCS | Mod: PBBFAC,,, | Performed by: OPTOMETRIST

## 2019-01-15 PROCEDURE — 92499 PR CONTACT LENS F/U LEV 1: ICD-10-PCS | Mod: ,,, | Performed by: OPTOMETRIST

## 2019-01-15 PROCEDURE — 92499 UNLISTED OPH SVC/PROCEDURE: CPT | Mod: ,,, | Performed by: OPTOMETRIST

## 2019-01-15 PROCEDURE — 92014 COMPRE OPH EXAM EST PT 1/>: CPT | Mod: S$GLB,,, | Performed by: OPTOMETRIST

## 2019-01-15 PROCEDURE — 92134 POSTERIOR SEGMENT OCT RETINA (OCULAR COHERENCE TOMOGRAPHY)-BOTH EYES: ICD-10-PCS | Mod: S$GLB,,, | Performed by: OPTOMETRIST

## 2019-01-15 PROCEDURE — 92014 PR EYE EXAM, EST PATIENT,COMPREHESV: ICD-10-PCS | Mod: S$GLB,,, | Performed by: OPTOMETRIST

## 2019-01-15 PROCEDURE — 99999 PR PBB SHADOW E&M-EST. PATIENT-LVL II: CPT | Mod: PBBFAC,,, | Performed by: OPTOMETRIST

## 2019-01-15 NOTE — PROGRESS NOTES
HPI     Last eye exam was 3/13/18 with Dr. Schmid.  Patient states decrease in vision OS>OD. Has trouble reading small print   since last exam. Sometimes OS is very sensitive and wakes up with it red   and swollen. OS nasally get red and very itchy-sometimes OD gets itchy but   not as bad.  Patient denies diplopia, headaches, flashes/floaters, and pain.        Last edited by Kandace Carrion on 1/15/2019  1:23 PM. (History)            Assessment /Plan     For exam results, see Encounter Report.    Nuclear sclerosis, bilateral  -     OCT- Retina    Visual distortions of shape and size     Redness of left eye  -     OCT- Retina    Myopia with presbyopia, bilateral          1-2.  Decrease vision OS-recently notices.  Cataracts similar between the eyes.  Macula normal and no corneal staining.  Topography done shows irregularity OS which is different from the one done last year.  History of LASIK.  ABMD?  3.  Left is normal today.  Educated pt to take make-up off every night.  Use artificial tears at least 2x/day and gel drops at night.  If needed will prescribe Maxitrol.    4.   Continue w/ current rx          RTC 2-3 weeks for refraction, cornea check, and possible topography.

## 2019-01-15 NOTE — PROGRESS NOTES
Assessment /Plan     For exam results, see Encounter Report.    Myopia with presbyopia, bilateral            1.  See note with same date.

## 2019-02-07 ENCOUNTER — OFFICE VISIT (OUTPATIENT)
Dept: OPTOMETRY | Facility: CLINIC | Age: 74
End: 2019-02-07
Payer: MEDICARE

## 2019-02-07 DIAGNOSIS — H57.89 REDNESS OF LEFT EYE: ICD-10-CM

## 2019-02-07 DIAGNOSIS — H52.4 MYOPIA WITH PRESBYOPIA, BILATERAL: Primary | ICD-10-CM

## 2019-02-07 DIAGNOSIS — H52.13 MYOPIA WITH PRESBYOPIA, BILATERAL: Primary | ICD-10-CM

## 2019-02-07 PROCEDURE — 99999 PR PBB SHADOW E&M-EST. PATIENT-LVL II: ICD-10-PCS | Mod: PBBFAC,,, | Performed by: OPTOMETRIST

## 2019-02-07 PROCEDURE — 99499 UNLISTED E&M SERVICE: CPT | Mod: S$GLB,,, | Performed by: OPTOMETRIST

## 2019-02-07 PROCEDURE — 99499 NO LOS: ICD-10-PCS | Mod: S$GLB,,, | Performed by: OPTOMETRIST

## 2019-02-07 PROCEDURE — 99999 PR PBB SHADOW E&M-EST. PATIENT-LVL II: CPT | Mod: PBBFAC,,, | Performed by: OPTOMETRIST

## 2019-02-07 NOTE — PROGRESS NOTES
HPI     Concerns About Ocular Health      Additional comments: 3 wk MR ck/cornea ck              Comments     Last eye exam was 1/15/19 with Dr. Staley.  Patient states vision OS has gotten better since using drops.  Patient denies diplopia, headaches, flashes/floaters, and pain.    Systane gel qhs OU  Systane BID OS          Last edited by Kandace Carrion on 2/7/2019  1:37 PM. (History)            Assessment /Plan     For exam results, see Encounter Report.    Myopia with presbyopia, bilateral    Redness of left eye              1.  Contact lens rx given.  Left eye pinholes to 20/40+.  Feel vision changes OS due to cornea.  Mild irregularity on topography.    2.  Continue with artificial tears and gel drops.  Patient reports left eye is doing much better.

## 2019-02-14 ENCOUNTER — HOSPITAL ENCOUNTER (OUTPATIENT)
Facility: HOSPITAL | Age: 74
Discharge: HOME | End: 2019-02-14
Attending: INTERNAL MEDICINE | Admitting: INTERNAL MEDICINE
Payer: MEDICARE

## 2019-02-14 ENCOUNTER — ANESTHESIA EVENT (OUTPATIENT)
Dept: ENDOSCOPY | Facility: HOSPITAL | Age: 74
End: 2019-02-14
Payer: MEDICARE

## 2019-02-14 ENCOUNTER — ANESTHESIA (OUTPATIENT)
Dept: ENDOSCOPY | Facility: HOSPITAL | Age: 74
End: 2019-02-14
Payer: MEDICARE

## 2019-02-14 VITALS
BODY MASS INDEX: 23.04 KG/M2 | HEART RATE: 68 BPM | HEIGHT: 63 IN | OXYGEN SATURATION: 97 % | WEIGHT: 130 LBS | RESPIRATION RATE: 16 BRPM | SYSTOLIC BLOOD PRESSURE: 157 MMHG | TEMPERATURE: 98 F | DIASTOLIC BLOOD PRESSURE: 67 MMHG

## 2019-02-14 DIAGNOSIS — Z12.11 SPECIAL SCREENING FOR MALIGNANT NEOPLASMS, COLON: Primary | ICD-10-CM

## 2019-02-14 PROCEDURE — G0121 COLON CA SCRN NOT HI RSK IND: ICD-10-PCS | Mod: ,,, | Performed by: INTERNAL MEDICINE

## 2019-02-14 PROCEDURE — 63600175 PHARM REV CODE 636 W HCPCS: Performed by: NURSE ANESTHETIST, CERTIFIED REGISTERED

## 2019-02-14 PROCEDURE — E9220 PRA ENDO ANESTHESIA: HCPCS | Mod: ,,, | Performed by: NURSE ANESTHETIST, CERTIFIED REGISTERED

## 2019-02-14 PROCEDURE — G0121 COLON CA SCRN NOT HI RSK IND: HCPCS | Performed by: INTERNAL MEDICINE

## 2019-02-14 PROCEDURE — 37000009 HC ANESTHESIA EA ADD 15 MINS: Performed by: INTERNAL MEDICINE

## 2019-02-14 PROCEDURE — E9220 PRA ENDO ANESTHESIA: ICD-10-PCS | Mod: ,,, | Performed by: NURSE ANESTHETIST, CERTIFIED REGISTERED

## 2019-02-14 PROCEDURE — G0121 COLON CA SCRN NOT HI RSK IND: HCPCS | Mod: ,,, | Performed by: INTERNAL MEDICINE

## 2019-02-14 PROCEDURE — 25000003 PHARM REV CODE 250: Performed by: INTERNAL MEDICINE

## 2019-02-14 PROCEDURE — 37000008 HC ANESTHESIA 1ST 15 MINUTES: Performed by: INTERNAL MEDICINE

## 2019-02-14 RX ORDER — PROPOFOL 10 MG/ML
VIAL (ML) INTRAVENOUS CONTINUOUS PRN
Status: DISCONTINUED | OUTPATIENT
Start: 2019-02-14 | End: 2019-02-14

## 2019-02-14 RX ORDER — SODIUM CHLORIDE 9 MG/ML
INJECTION, SOLUTION INTRAVENOUS CONTINUOUS
Status: DISCONTINUED | OUTPATIENT
Start: 2019-02-14 | End: 2019-02-14 | Stop reason: HOSPADM

## 2019-02-14 RX ORDER — MIDAZOLAM HYDROCHLORIDE 1 MG/ML
INJECTION, SOLUTION INTRAMUSCULAR; INTRAVENOUS
Status: DISCONTINUED | OUTPATIENT
Start: 2019-02-14 | End: 2019-02-14

## 2019-02-14 RX ORDER — PROPOFOL 10 MG/ML
VIAL (ML) INTRAVENOUS
Status: DISCONTINUED | OUTPATIENT
Start: 2019-02-14 | End: 2019-02-14

## 2019-02-14 RX ORDER — LIDOCAINE HCL/PF 100 MG/5ML
SYRINGE (ML) INTRAVENOUS
Status: DISCONTINUED | OUTPATIENT
Start: 2019-02-14 | End: 2019-02-14

## 2019-02-14 RX ADMIN — LIDOCAINE HYDROCHLORIDE 50 MG: 20 INJECTION, SOLUTION INTRAVENOUS at 12:02

## 2019-02-14 RX ADMIN — PROPOFOL 125 MCG/KG/MIN: 10 INJECTION, EMULSION INTRAVENOUS at 12:02

## 2019-02-14 RX ADMIN — MIDAZOLAM HYDROCHLORIDE 2 MG: 1 INJECTION, SOLUTION INTRAMUSCULAR; INTRAVENOUS at 11:02

## 2019-02-14 RX ADMIN — SODIUM CHLORIDE: 0.9 INJECTION, SOLUTION INTRAVENOUS at 11:02

## 2019-02-14 RX ADMIN — PROPOFOL 100 MG: 10 INJECTION, EMULSION INTRAVENOUS at 12:02

## 2019-02-14 NOTE — PROVATION PATIENT INSTRUCTIONS
Discharge Summary/Instructions after an Endoscopic Procedure  Patient Name: Nelsy Mckenna  Patient MRN: [id number]  Patient YOB: 1945 Thursday, February 14, 2019  Keven Mckoy MD  RESTRICTIONS:  During your procedure today, you received medications for sedation.  These   medications may affect your judgment, balance and coordination.  Therefore,   for 24 hours, you have the following restrictions:   - DO NOT drive a car, operate machinery, make legal/financial decisions,   sign important papers or drink alcohol.    ACTIVITY:  Today: no heavy lifting, straining or running due to procedural   sedation/anesthesia.  The following day: return to full activity including work.  DIET:  Eat and drink normally unless instructed otherwise.     TREATMENT FOR COMMON SIDE EFFECTS:  - Mild abdominal pain, nausea, belching, bloating or excessive gas:  rest,   eat lightly and use a heating pad.  - Sore Throat: treat with throat lozenges and/or gargle with warm salt   water.  - Because air was used during the procedure, expelling large amounts of air   from your rectum or belching is normal.  - If a bowel prep was taken, you may not have a bowel movement for 1-3 days.    This is normal.  SYMPTOMS TO WATCH FOR AND REPORT TO YOUR PHYSICIAN:  1. Abdominal pain or bloating, other than gas cramps.  2. Chest pain.  3. Back pain.  4. Signs of infection such as: chills or fever occurring within 24 hours   after the procedure.  5. Rectal bleeding, which would show as bright red, maroon, or black stools.   (A tablespoon of blood from the rectum is not serious, especially if   hemorrhoids are present.)  6. Vomiting.  7. Weakness or dizziness.  GO DIRECTLY TO THE NEAREST EMERGENCY ROOM IF YOU HAVE ANY OF THE FOLLOWING:      Difficulty breathing              Chills and/or fever over 101 F   Persistent vomiting and/or vomiting blood   Severe abdominal pain   Severe chest pain   Black, tarry stools   Bleeding- more than one tablespoon   Any other  symptom or condition that you feel may need urgent attention  Your doctor recommends these additional instructions:  If any biopsies were taken, your doctors clinic will contact you in 1 to 2   weeks with any results.  - Patient has a contact number available for emergencies.  The signs and   symptoms of potential delayed complications were discussed with the   patient.  Return to normal activities tomorrow.  Written discharge   instructions were provided to the patient.   - Discharge patient to home.   - High fiber diet and augmented water consumption.  - Repeat colonoscopy is not recommended due to current age (66 years or   older) for screening purposes.   - The findings and recommendations were discussed with the designated   responsible adult.  For questions, problems or results please call your physician - Keven Mckoy MD at Work:  [phone].  [facility], EMERGENCY ROOM PHONE NUMBER: [phone]  IF A COMPLICATION OR EMERGENCY SITUATION ARISES AND YOU ARE UNABLE TO REACH   YOUR PHYSICIAN - GO DIRECTLY TO THE EMERGENCY ROOM.  Keven Mckoy MD  2/14/2019 12:25:51 PM  This report has been verified and signed electronically.  [employer]ATION

## 2019-02-14 NOTE — ANESTHESIA PREPROCEDURE EVALUATION
02/14/2019  Nelsy Mckenna is a 73 y.o., female here for screening colonoscopy.  Otherwise healthy.    Anesthesia Evaluation    I have reviewed the Patient Summary Reports.     I have reviewed the Medications.     Review of Systems  Anesthesia Hx:  No problems with previous Anesthesia    Cardiovascular:   Exercise tolerance: good Denies CABG/stent.   Denies Angina.    Pulmonary:  Pulmonary Normal  Denies Shortness of breath.    Neurological:  Neurology Normal        Physical Exam  General:  Well nourished    Airway/Jaw/Neck:  Airway Findings: Mouth Opening: Small, but > 3cm Tongue: Normal  General Airway Assessment: Adult  Mallampati: II  Jaw/Neck Findings:  Neck ROM: Normal ROM      Dental:  Dental Findings: In tact   Chest/Lungs:  Chest/Lungs Findings: Clear to auscultation, Normal Respiratory Rate     Heart/Vascular:  Heart Findings: Rate: Normal  Rhythm: Regular Rhythm  Sounds: Normal        Mental Status:  Mental Status Findings:  Cooperative, Alert and Oriented         Anesthesia Plan  Type of Anesthesia, risks & benefits discussed:  Anesthesia Type:  general  Patient's Preference:   Intra-op Monitoring Plan: standard ASA monitors  Intra-op Monitoring Plan Comments:   Post Op Pain Control Plan: multimodal analgesia  Post Op Pain Control Plan Comments:   Induction:   IV  Beta Blocker:         Informed Consent: Patient understands risks and agrees with Anesthesia plan.  Questions answered. Anesthesia consent signed with patient.  ASA Score: 2     Day of Surgery Review of History & Physical:    H&P update referred to the surgeon.         Ready For Surgery From Anesthesia Perspective.

## 2019-02-14 NOTE — TRANSFER OF CARE
"Anesthesia Transfer of Care Note    Patient: Nelsy Mckenna    Procedure(s) Performed: Procedure(s) (LRB):  COLONOSCOPY (N/A)    Patient location: GI    Anesthesia Type: general    Transport from OR: Transported from OR on room air with adequate spontaneous ventilation    Post pain: adequate analgesia    Post assessment: no apparent anesthetic complications    Post vital signs: stable    Level of consciousness: sedated    Nausea/Vomiting: no nausea/vomiting    Complications: none    Transfer of care protocol was followed      Last vitals:   Visit Vitals  /60 (BP Location: Left arm, Patient Position: Lying)   Pulse 74   Temp 36.3 °C (97.3 °F) (Temporal)   Resp 16   Ht 5' 3" (1.6 m)   Wt 59 kg (130 lb)   SpO2 98%   Breastfeeding? No   BMI 23.03 kg/m²     "

## 2019-02-14 NOTE — DISCHARGE INSTRUCTIONS
Colonoscopy     A camera attached to a flexible tube with a viewing lens is used to take video pictures.     Colonoscopy is a test to view the inside of your lower digestive tract (colon and rectum). Sometimes it can show the last part of the small intestine (ileum). During the test, small pieces of tissue may be removed for testing. This is called a biopsy. Small growths, such as polyps, may also be removed.   Why is colonoscopy done?  The test is done to help look for colon cancer. And it can help find the source of abdominal pain, bleeding, and changes in bowel habits. It may be needed once a year, depending on factors such as your:  · Age  · Health history  · Family health history  · Symptoms  · Results from any prior colonoscopy  Risks and possible complications  These include:  · Bleeding               · A puncture or tear in the colon   · Risks of anesthesia  · A cancer lesion not being seen  Getting ready   To prepare for the test:  · Talk with your healthcare provider about the risks of the test (see below). Also ask your healthcare provider about alternatives to the test.  · Tell your healthcare provider about any medicines you take. Also tell him or her about any health conditions you may have.  · Make sure your rectum and colon are empty for the test. Follow the diet and bowel prep instructions exactly. If you dont, the test may need to be rescheduled.  · Plan for a friend or family member to drive you home after the test.     Colonoscopy provides an inside view of the entire colon.     You may discuss the results with your doctor right away or at a future visit.  During the test   The test is usually done in the hospital on an outpatient basis. This means you go home the same day. The procedure takes about 30 minutes. During that time:  · You are given relaxing (sedating) medicine through an IV line. You may be drowsy, or fully asleep.  · The healthcare provider will first give you a physical exam to  check for anal and rectal problems.  · Then the anus is lubricated and the scope inserted.  · If you are awake, you may have a feeling similar to needing to have a bowel movement. You may also feel pressure as air is pumped into the colon. Its OK to pass gas during the procedure.  · Biopsy, polyp removal, or other treatments may be done during the test.  After the test   You may have gas right after the test. It can help to try to pass it to help prevent later bloating. Your healthcare provider may discuss the results with you right away. Or you may need to schedule a follow-up visit to talk about the results. After the test, you can go back to your normal eating and other activities. You may be tired from the sedation and need to rest for a few hours.  Date Last Reviewed: 11/1/2016 © 2000-2017 [employer]. [address] [place] [address], [place] [address]. All rights reserved. This information is not intended as a substitute for professional medical care. Always follow your healthcare professional's instructions.

## 2019-02-14 NOTE — H&P
Short Stay Endoscopy History and Physical    PCP - Libia Randall MD    Procedure - Colonoscopy  ASA - per anesthesia  Mallampati - per anesthesia  History of Anesthesia problems - no  Family history Anesthesia problems - no   Plan of anesthesia - General    HPI:  This is a 73 y.o. female here for evaluation of : asymptomatic screening exam      ROS:  Constitutional: No fevers, chills, No weight loss  CV: No chest pain  Pulm: No cough, No shortness of breath  GI: see HPI  Derm: No rash    Medical History:  has a past medical history of Cataract, Diverticulosis, Nephrolithiasis, Osteopenia, PCK (polycystic kidney disease), and Vitamin D deficiency.    Surgical History:  has a past surgical history that includes  section; Nasal septum surgery; Facial cosmetic surgery; Tubal ligation; and Refractive surgery (Bilateral, ).    Family History: family history includes Diabetes in her mother; Heart disease in her father; Hypertension in her mother; No Known Problems in her sister, sister, son, son, and son; Stroke in her daughter and mother.. Otherwise no colon cancer, inflammatory bowel disease, or GI malignancies.    Social History:  reports that she quit smoking about 11 years ago. Her smoking use included cigarettes. She started smoking about 51 years ago. She smoked 0.25 packs per day. she has never used smokeless tobacco. She reports that she drinks about 4.2 oz of alcohol per week. She reports that she does not use drugs.    Review of patient's allergies indicates:  No Known Allergies    Medications:   Medications Prior to Admission   Medication Sig Dispense Refill Last Dose    ergocalciferol (ERGOCALCIFEROL) 50,000 unit Cap Take 1 capsule (50,000 Units total) by mouth every 7 days. 12 capsule 0 Past Week at Unknown time    hypromellose (SYSTANE GEL OPHT) Place 1 drop into both eyes nightly as needed.   2019 at Unknown time    propylene glycol/peg 400 (SYSTANE ULTRA OPHT) Place 1 drop  into the left eye 2 (two) times daily.   2/13/2019 at Unknown time         Physical Exam:    Vital Signs:   Vitals:    02/14/19 1110   BP: 127/60   Pulse: 74   Resp: 16   Temp: 97.3 °F (36.3 °C)       General Appearance: Well appearing in no acute distress  Eyes:    No scleral icterus  ENT: Neck supple, Lips, mucosa, and tongue normal; teeth and gums normal  Lungs: CTA bilaterally  Heart:  S1, S2 normal, no murmurs heard  Abdomen: Soft, non tender, non distended with positive bowel sounds. No hepatosplenomegaly, ascites, or mass.  Extremities: 2+ pulses, no clubbing, cyanosis or edema  Skin: No rash      Labs:  Lab Results   Component Value Date    WBC 5.28 12/18/2018    HGB 12.3 12/18/2018    HCT 38.6 12/18/2018     12/18/2018    CHOL 189 12/18/2018    TRIG 66 12/18/2018    HDL 74 12/18/2018    ALT 15 06/19/2018    AST 17 06/19/2018     11/05/2018    K 4.1 11/05/2018     11/05/2018    CREATININE 1.0 11/05/2018    BUN 23 11/05/2018    CO2 24 11/05/2018    TSH 2.694 12/18/2018    INR 0.8 06/08/2018    HGBA1C 5.3 10/31/2012       I have explained the risks and benefits of endoscopy procedures to the patient including but not limited to bleeding, perforation, infection, and death.      Keven Mckoy MD

## 2019-02-15 NOTE — ANESTHESIA POSTPROCEDURE EVALUATION
"Anesthesia Post Evaluation    Patient: Nelsy Mckenna    Procedure(s) Performed: Procedure(s) (LRB):  COLONOSCOPY (N/A)    Final Anesthesia Type: general  Patient location during evaluation: PACU  Patient participation: Yes- Able to Participate  Level of consciousness: awake and alert and oriented  Post-procedure vital signs: reviewed and stable  Pain management: adequate  Airway patency: patent  PONV status at discharge: No PONV  Anesthetic complications: no      Cardiovascular status: blood pressure returned to baseline and hemodynamically stable  Respiratory status: unassisted and spontaneous ventilation  Hydration status: euvolemic  Follow-up not needed.        Visit Vitals  BP (!) 157/67 (BP Location: Left arm, Patient Position: Lying)   Pulse 68   Temp 36.6 °C (97.9 °F) (Temporal)   Resp 16   Ht 5' 3" (1.6 m)   Wt 59 kg (130 lb)   SpO2 97%   Breastfeeding? No   BMI 23.03 kg/m²       Pain/Alvarado Score: Alvarado Score: 10 (2/14/2019 12:47 PM)  Modified Alvarado Score: 20 (2/14/2019 12:47 PM)        "

## 2019-02-18 ENCOUNTER — TELEPHONE (OUTPATIENT)
Dept: NEPHROLOGY | Facility: CLINIC | Age: 74
End: 2019-02-18

## 2019-02-20 DIAGNOSIS — N18.30 CHRONIC KIDNEY DISEASE, STAGE III (MODERATE): Primary | ICD-10-CM

## 2019-02-21 ENCOUNTER — TELEPHONE (OUTPATIENT)
Dept: ENDOSCOPY | Facility: HOSPITAL | Age: 74
End: 2019-02-21

## 2019-03-18 ENCOUNTER — LAB VISIT (OUTPATIENT)
Dept: LAB | Facility: OTHER | Age: 74
End: 2019-03-18
Payer: MEDICARE

## 2019-03-18 DIAGNOSIS — N18.30 CHRONIC KIDNEY DISEASE, STAGE III (MODERATE): ICD-10-CM

## 2019-03-18 LAB
ALBUMIN SERPL BCP-MCNC: 3.6 G/DL
ANION GAP SERPL CALC-SCNC: 9 MMOL/L
BUN SERPL-MCNC: 37 MG/DL
CALCIUM SERPL-MCNC: 10 MG/DL
CHLORIDE SERPL-SCNC: 106 MMOL/L
CO2 SERPL-SCNC: 26 MMOL/L
CREAT SERPL-MCNC: 1.2 MG/DL
EST. GFR  (AFRICAN AMERICAN): 53 ML/MIN/1.73 M^2
EST. GFR  (NON AFRICAN AMERICAN): 46 ML/MIN/1.73 M^2
GLUCOSE SERPL-MCNC: 104 MG/DL
PHOSPHATE SERPL-MCNC: 3.3 MG/DL
POTASSIUM SERPL-SCNC: 4 MMOL/L
SODIUM SERPL-SCNC: 141 MMOL/L

## 2019-03-18 PROCEDURE — 36415 COLL VENOUS BLD VENIPUNCTURE: CPT

## 2019-03-18 PROCEDURE — 80069 RENAL FUNCTION PANEL: CPT

## 2019-03-21 ENCOUNTER — OFFICE VISIT (OUTPATIENT)
Dept: NEPHROLOGY | Facility: CLINIC | Age: 74
End: 2019-03-21
Payer: MEDICARE

## 2019-03-21 VITALS
WEIGHT: 126 LBS | SYSTOLIC BLOOD PRESSURE: 158 MMHG | OXYGEN SATURATION: 98 % | HEART RATE: 57 BPM | BODY MASS INDEX: 22.32 KG/M2 | HEIGHT: 63 IN | DIASTOLIC BLOOD PRESSURE: 80 MMHG

## 2019-03-21 DIAGNOSIS — N20.0 KIDNEY STONE: Primary | ICD-10-CM

## 2019-03-21 PROCEDURE — 99999 PR PBB SHADOW E&M-EST. PATIENT-LVL III: ICD-10-PCS | Mod: PBBFAC,,, | Performed by: INTERNAL MEDICINE

## 2019-03-21 PROCEDURE — 99999 PR PBB SHADOW E&M-EST. PATIENT-LVL III: CPT | Mod: PBBFAC,,, | Performed by: INTERNAL MEDICINE

## 2019-03-21 PROCEDURE — 1101F PT FALLS ASSESS-DOCD LE1/YR: CPT | Mod: CPTII,S$GLB,, | Performed by: INTERNAL MEDICINE

## 2019-03-21 PROCEDURE — 99499 RISK ADDL DX/OHS AUDIT: ICD-10-PCS | Mod: S$GLB,,, | Performed by: INTERNAL MEDICINE

## 2019-03-21 PROCEDURE — 1101F PR PT FALLS ASSESS DOC 0-1 FALLS W/OUT INJ PAST YR: ICD-10-PCS | Mod: CPTII,S$GLB,, | Performed by: INTERNAL MEDICINE

## 2019-03-21 PROCEDURE — 99214 PR OFFICE/OUTPT VISIT, EST, LEVL IV, 30-39 MIN: ICD-10-PCS | Mod: S$GLB,,, | Performed by: INTERNAL MEDICINE

## 2019-03-21 PROCEDURE — 99214 OFFICE O/P EST MOD 30 MIN: CPT | Mod: S$GLB,,, | Performed by: INTERNAL MEDICINE

## 2019-03-21 PROCEDURE — 99499 UNLISTED E&M SERVICE: CPT | Mod: S$GLB,,, | Performed by: INTERNAL MEDICINE
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
## 2019-04-09 ENCOUNTER — PES CALL (OUTPATIENT)
Dept: ADMINISTRATIVE | Facility: CLINIC | Age: 74
End: 2019-04-09

## 2019-05-06 ENCOUNTER — OFFICE VISIT (OUTPATIENT)
Dept: INTERNAL MEDICINE | Facility: CLINIC | Age: 74
End: 2019-05-06
Payer: MEDICARE

## 2019-05-06 VITALS
DIASTOLIC BLOOD PRESSURE: 62 MMHG | OXYGEN SATURATION: 96 % | SYSTOLIC BLOOD PRESSURE: 118 MMHG | WEIGHT: 126.31 LBS | HEIGHT: 62 IN | HEART RATE: 64 BPM | BODY MASS INDEX: 23.24 KG/M2

## 2019-05-06 DIAGNOSIS — M85.80 OSTEOPENIA, UNSPECIFIED LOCATION: ICD-10-CM

## 2019-05-06 DIAGNOSIS — N20.0 KIDNEY STONE: ICD-10-CM

## 2019-05-06 DIAGNOSIS — N18.30 CKD (CHRONIC KIDNEY DISEASE) STAGE 3, GFR 30-59 ML/MIN: ICD-10-CM

## 2019-05-06 DIAGNOSIS — E55.9 VITAMIN D DEFICIENCY: ICD-10-CM

## 2019-05-06 DIAGNOSIS — N28.1 ACQUIRED BILATERAL RENAL CYSTS: ICD-10-CM

## 2019-05-06 DIAGNOSIS — Z00.00 ENCOUNTER FOR PREVENTIVE HEALTH EXAMINATION: Primary | ICD-10-CM

## 2019-05-06 PROCEDURE — G0439 PR MEDICARE ANNUAL WELLNESS SUBSEQUENT VISIT: ICD-10-PCS | Mod: S$GLB,,, | Performed by: NURSE PRACTITIONER

## 2019-05-06 PROCEDURE — 99499 RISK ADDL DX/OHS AUDIT: ICD-10-PCS | Mod: S$GLB,,, | Performed by: NURSE PRACTITIONER

## 2019-05-06 PROCEDURE — G0439 PPPS, SUBSEQ VISIT: HCPCS | Mod: S$GLB,,, | Performed by: NURSE PRACTITIONER

## 2019-05-06 PROCEDURE — 99999 PR PBB SHADOW E&M-EST. PATIENT-LVL IV: ICD-10-PCS | Mod: PBBFAC,,, | Performed by: NURSE PRACTITIONER

## 2019-05-06 PROCEDURE — 99999 PR PBB SHADOW E&M-EST. PATIENT-LVL IV: CPT | Mod: PBBFAC,,, | Performed by: NURSE PRACTITIONER

## 2019-05-06 PROCEDURE — 99499 UNLISTED E&M SERVICE: CPT | Mod: S$GLB,,, | Performed by: NURSE PRACTITIONER

## 2019-05-06 NOTE — PROGRESS NOTES
"Nelsy Mckenna presented for a  Medicare AWV and comprehensive Health Risk Assessment today. The following components were reviewed and updated:    · Medical history  · Family History  · Social history  · Allergies and Current Medications  · Health Risk Assessment  · Health Maintenance  · Care Team     ** See Completed Assessments for Annual Wellness Visit within the encounter summary.**       The following assessments were completed:  · Living Situation  · CAGE  · Depression Screening  · Timed Get Up and Go  · Whisper Test  · Cognitive Function Screening  · Nutrition Screening  · ADL Screening  · PAQ Screening          Vitals:    05/06/19 1401   BP: 118/62   BP Location: Left arm   Patient Position: Sitting   Pulse: 64   SpO2: 96%   Weight: 57.3 kg (126 lb 5.2 oz)   Height: 5' 2" (1.575 m)     Body mass index is 23.1 kg/m².     Physical Exam   Constitutional: She is oriented to person, place, and time. She appears well-developed and well-nourished.   HENT:   Head: Normocephalic and atraumatic. Not macrocephalic and not microcephalic. Head is without raccoon's eyes, without Gonzales's sign, without abrasion, without contusion, without laceration, without right periorbital erythema and without left periorbital erythema. Hair is normal.   Right Ear: No lacerations. No drainage, swelling or tenderness. No foreign bodies. No mastoid tenderness. Tympanic membrane is not injected, not scarred, not perforated, not erythematous, not retracted and not bulging. Tympanic membrane mobility is normal. No middle ear effusion. No hemotympanum. No decreased hearing is noted.   Left Ear: No lacerations. No drainage, swelling or tenderness. No foreign bodies. No mastoid tenderness. Tympanic membrane is not injected, not scarred, not perforated, not erythematous, not retracted and not bulging. Tympanic membrane mobility is normal.  No middle ear effusion. No hemotympanum. No decreased hearing is noted.   Nose: Nose normal. No mucosal edema, " rhinorrhea, nose lacerations, sinus tenderness or nasal deformity.   Mouth/Throat: Uvula is midline.   Eyes: Conjunctivae and lids are normal. No scleral icterus.   Neck: Trachea normal. Neck supple. No spinous process tenderness and no muscular tenderness present. No neck rigidity. No edema, no erythema and normal range of motion present. No thyroid mass and no thyromegaly present.   Cardiovascular: Normal rate, regular rhythm, normal heart sounds and intact distal pulses. Exam reveals no gallop and no friction rub.   No murmur heard.  Pulmonary/Chest: Effort normal and breath sounds normal. No stridor. No respiratory distress. She has no wheezes. She has no rales. She exhibits no tenderness.   Abdominal: Soft. Bowel sounds are normal. She exhibits no distension.   Musculoskeletal: Normal range of motion.   Lymphadenopathy:        Head (right side): No submental, no submandibular, no tonsillar, no preauricular and no posterior auricular adenopathy present.        Head (left side): No submental, no submandibular, no tonsillar, no preauricular, no posterior auricular and no occipital adenopathy present.   Neurological: She is alert and oriented to person, place, and time.   Skin: Skin is warm and dry.   Psychiatric: She has a normal mood and affect. Her behavior is normal. Judgment and thought content normal.   Vitals reviewed.      Diagnoses and health risks identified today and associated recommendations/orders:    1. Encounter for preventive health examination  Annual Health Risk Assessment (HRA) visit today.  Counseling and referral of health maintenance and preventative health measures performed.  Patient given annual wellness paperwork to take home.  Encouraged to return in 1 year for subsequent HRA visit.     2. CKD (chronic kidney disease) stage 3, GFR 30-59 ml/min  Chronic. Stable. Continue current treatment plan as previously prescribed by PCP.    3. Osteopenia, unspecified location  Chronic. Stable.  Continue current treatment plan as previously prescribed by PCP.    4. Vitamin D deficiency  Chronic. Stable. Continue current treatment plan as previously prescribed by PCP.    5. Kidney stone  Chronic. Stable. Continue current treatment plan as previously prescribed by PCP.    6. Acquired bilateral renal cysts  Chronic. Stable. Continue current treatment plan as previously prescribed by PCP.        Provided Nelsy with a 5-10 year written screening schedule and personal prevention plan. Recommendations were developed using the USPSTF age appropriate recommendations. Education, counseling, and referrals were provided as needed. After Visit Summary printed and given to patient which includes a list of additional screenings\tests needed.    Follow up in about 1 year (around 5/6/2020).    Kimani Tracy NP  I offered to discuss end of life issues, including information on how to make advance directives that the patient could use to name someone who would make medical decisions on their behalf if they became too ill to make themselves.    ___Patient declined  _X_Patient is interested, I provided paper work and offered to discuss.

## 2019-05-06 NOTE — PATIENT INSTRUCTIONS
Counseling and Referral of Other Preventative  (Italic type indicates deductible and co-insurance are waived)    Patient Name: Nelsy Mckenna  Today's Date: 5/6/2019    Health Maintenance       Date Due Completion Date    Shingles Vaccine (2 of 3) 05/06/2020 (Originally 8/26/2010) 7/1/2010    Influenza Vaccine 08/01/2019 11/28/2018 (Done)    Override on 11/28/2018: Done    Override on 9/4/2017: Done ([id number])    Override on 10/14/2014: Done    DEXA SCAN 07/10/2020 7/10/2017 (Done)    Override on 7/10/2017: Done    Override on 6/13/2006: Done    Mammogram 11/27/2020 11/27/2018 (Done)    Override on 11/27/2018: Done (negative)    Override on 8/16/2017: Done ([facility] and negative)    Override on 9/23/2014: Done    TETANUS VACCINE 07/12/2023 7/12/2013    Lipid Panel 12/18/2023 12/18/2018    Colonoscopy 02/14/2029 2/14/2019        No orders of the defined types were placed in this encounter.    The following information is provided to all patients.  This information is to help you find resources for any of the problems found today that may be affecting your health:                Living healthy guide: www.[facility].[place].gov      Understanding Diabetes: www.diabetes.org      Eating healthy: www.cdc.gov/healthyweight      CDC home safety checklist: www.cdc.gov/steadi/patient.html      Agency on Aging: www.goea.[place].gov      Alcoholics anonymous (AA): www.aa.org      Physical Activity: www.[place].nih.gov/th6ickq      Tobacco use: www.quitwithusla.org

## 2019-07-15 ENCOUNTER — HOSPITAL ENCOUNTER (OUTPATIENT)
Dept: RADIOLOGY | Facility: OTHER | Age: 74
Discharge: HOME | End: 2019-07-15
Attending: UROLOGY
Payer: MEDICARE

## 2019-07-15 DIAGNOSIS — N28.1 ACQUIRED BILATERAL RENAL CYSTS: ICD-10-CM

## 2019-07-15 PROCEDURE — 76770 US EXAM ABDO BACK WALL COMP: CPT | Mod: TC

## 2019-07-15 PROCEDURE — 76770 US RETROPERITONEAL COMPLETE: ICD-10-PCS | Mod: 26,,, | Performed by: RADIOLOGY

## 2019-07-15 PROCEDURE — 76770 US EXAM ABDO BACK WALL COMP: CPT | Mod: 26,,, | Performed by: RADIOLOGY

## 2019-07-19 ENCOUNTER — OFFICE VISIT (OUTPATIENT)
Dept: UROLOGY | Facility: CLINIC | Age: 74
End: 2019-07-19
Payer: MEDICARE

## 2019-07-19 VITALS
SYSTOLIC BLOOD PRESSURE: 122 MMHG | BODY MASS INDEX: 22.27 KG/M2 | DIASTOLIC BLOOD PRESSURE: 70 MMHG | HEART RATE: 61 BPM | WEIGHT: 125.69 LBS | HEIGHT: 63 IN

## 2019-07-19 DIAGNOSIS — R80.8 OTHER PROTEINURIA: ICD-10-CM

## 2019-07-19 DIAGNOSIS — N28.1 ACQUIRED BILATERAL RENAL CYSTS: Primary | ICD-10-CM

## 2019-07-19 PROCEDURE — 1101F PT FALLS ASSESS-DOCD LE1/YR: CPT | Mod: CPTII,S$GLB,, | Performed by: UROLOGY

## 2019-07-19 PROCEDURE — 99214 OFFICE O/P EST MOD 30 MIN: CPT | Mod: S$GLB,,, | Performed by: UROLOGY

## 2019-07-19 PROCEDURE — 99214 PR OFFICE/OUTPT VISIT, EST, LEVL IV, 30-39 MIN: ICD-10-PCS | Mod: S$GLB,,, | Performed by: UROLOGY

## 2019-07-19 PROCEDURE — 99999 PR PBB SHADOW E&M-EST. PATIENT-LVL III: CPT | Mod: PBBFAC,,, | Performed by: UROLOGY

## 2019-07-19 PROCEDURE — 99999 PR PBB SHADOW E&M-EST. PATIENT-LVL III: ICD-10-PCS | Mod: PBBFAC,,, | Performed by: UROLOGY

## 2019-07-19 PROCEDURE — 1101F PR PT FALLS ASSESS DOC 0-1 FALLS W/OUT INJ PAST YR: ICD-10-PCS | Mod: CPTII,S$GLB,, | Performed by: UROLOGY

## 2019-07-19 NOTE — PROGRESS NOTES
CC: bilateral multiple renal cysts, kidney stone disease    Nelsy Mckenna is a 73 y.o. woman who is here for the evaluation of kidney cysts, stone, kidney function problem.  Saw her last year following MAV.   CTrevealed incidental findings of bilateral multiple renal cysts.  Thus she came to see me.  Reassurance given and was asked to follow up with renal US.    She is here today with renal us and BMP.  Denies any flank pian, hematuria.  Denies any family hx of polycystic kidney disease.  She also had renal US.  She is her to discuss the findings.  Urination symptoms: Negative for frequency, urgency and incontinence.  Denies flank pain, dysuira, hematuria     Past Medical History:   Diagnosis Date    Cataract     Diverticulosis     Nephrolithiasis     Osteopenia     PCK (polycystic kidney disease)     Vitamin D deficiency      Past Surgical History:   Procedure Laterality Date     SECTION      COLONOSCOPY N/A 2019    Performed by Keven Mckoy MD at [facility] ([address] [place] [address])    EYE SURGERY      FACIAL COSMETIC SURGERY      NASAL SEPTUM SURGERY      REFRACTIVE SURGERY Bilateral     TUBAL LIGATION       Social History     Tobacco Use    Smoking status: Former Smoker     Packs/day: 0.25     Years: 40.00     Pack years: 10.00     Types: Cigarettes     Start date: 1967     Last attempt to quit: 2007     Years since quittin.6    Smokeless tobacco: Never Used   Substance Use Topics    Alcohol use: Yes     Alcohol/week: 4.2 oz     Types: 7 Glasses of wine per week    Drug use: No     Family History   Problem Relation Age of Onset    Diabetes Mother     Hypertension Mother     Stroke Mother     Heart disease Father          at 59 -  2/ drowning - suspect cardiac cause that led to drowning    No Known Problems Sister     Stroke Daughter         CVA - on ocps    No Known Problems Son     No Known Problems Sister     No Known Problems Son     No Known Problems Son      Blindness Neg Hx     Glaucoma Neg Hx     Macular degeneration Neg Hx     Retinal detachment Neg Hx      Allergy:  Review of patient's allergies indicates:  No Known Allergies  Outpatient Encounter Medications as of 7/19/2019   Medication Sig Dispense Refill    hypromellose (SYSTANE GEL OPHT) Place 1 drop into both eyes nightly as needed.      propylene glycol/peg 400 (SYSTANE ULTRA OPHT) Place 1 drop into the left eye 2 (two) times daily.      [DISCONTINUED] ergocalciferol (ERGOCALCIFEROL) 50,000 unit Cap Take 1 capsule (50,000 Units total) by mouth every 7 days. 12 capsule 0     No facility-administered encounter medications on file as of 7/19/2019.      Review of Systems   General ROS: GENERAL:  No weight gain or loss  SKIN:  No rashes or lacerations  HEAD:  No headaches  EYES:  No exophthalmos, jaundice or blindness  EARS:  No dizziness, tinnitus or hearing loss  NOSE:  No changes in smell  MOUTH & THROAT:  No dyskinetic movements or obvious goiter  CHEST:  No shortness of breath, hyperventilation or cough  CARDIOVASCULAR:  No tachycardia or chest pain  ABDOMEN:  No nausea, vomiting, pain, constipation or diarrhea  URINARY:  No frequency, dysuria or sexual dysfunction  ENDOCRINE:  No polydipsia, polyuria  MUSCULOSKELETAL:  No pain or stiffness of the joints  NEUROLOGIC:  No weakness, sensory changes, seizures, confusion, memory loss, tremor or other abnormal movements  Physical Exam     Vitals:    07/19/19 1012   BP: 122/70   Pulse: 61     Physical Exam      LABS:  Lab Results   Component Value Date    CREATININE 1.2 03/18/2019    CREATININE 1.0 11/05/2018    CREATININE 1.1 06/19/2018     No results found for: LABURIN  Radiology:  CT 6/6//18  Diffusely echogenic kidneys with loss of corticomedullary differentiation, in keeping with chronic medical renal disease and overall degrading evaluation of the renal parenchyma.    Numerous bilateral renal cysts are present without definite solid lesion, again noting  difficult visualization of the kidneys.  Some CSF more complex appearance with septations and associated calcifications.    US 7/15/19  1. Sonographic findings of chronic medical renal disease noting diffusely echogenic renal cortices with loss of corticomedullary differentiation, findings that limit parenchymal evaluation.  2. Multiple bilateral renal cysts, not well evaluated due to aforementioned limitation.  3. Scattered echogenic foci throughout both kidneys, possibly related to cystic mural calcifications or nonobstructing stones.    UA today clear with trace of protein    Assessment and Plan:  Diagnoses and all orders for this visit:    Acquired bilateral renal cysts    Other proteinuria    I reviewed her CT and US in detail with her.  For now, no active symptoms or issues are noted.  Will follow her with serial US annually or sooner if she becomes symptomatic such as hematuria or flank pain.  I think that she will be better served by follow up with a nephrologist.    Plenty of water drinking recommended.    All questions answered.    Follow-up:  Follow up if symptoms worsen or fail to improve.

## 2019-07-24 ENCOUNTER — OFFICE VISIT (OUTPATIENT)
Dept: INTERNAL MEDICINE | Facility: CLINIC | Age: 74
End: 2019-07-24
Attending: INTERNAL MEDICINE
Payer: MEDICARE

## 2019-07-24 ENCOUNTER — LAB VISIT (OUTPATIENT)
Dept: LAB | Facility: OTHER | Age: 74
End: 2019-07-24
Attending: INTERNAL MEDICINE
Payer: MEDICARE

## 2019-07-24 VITALS
WEIGHT: 125.44 LBS | HEART RATE: 60 BPM | SYSTOLIC BLOOD PRESSURE: 122 MMHG | DIASTOLIC BLOOD PRESSURE: 80 MMHG | HEIGHT: 63 IN | BODY MASS INDEX: 22.23 KG/M2 | OXYGEN SATURATION: 98 %

## 2019-07-24 DIAGNOSIS — N18.30 CKD (CHRONIC KIDNEY DISEASE) STAGE 3, GFR 30-59 ML/MIN: ICD-10-CM

## 2019-07-24 DIAGNOSIS — M85.80 OSTEOPENIA, UNSPECIFIED LOCATION: ICD-10-CM

## 2019-07-24 DIAGNOSIS — F40.243 FEAR OF FLYING: ICD-10-CM

## 2019-07-24 DIAGNOSIS — Z13.6 ENCOUNTER FOR LIPID SCREENING FOR CARDIOVASCULAR DISEASE: ICD-10-CM

## 2019-07-24 DIAGNOSIS — Z13.220 ENCOUNTER FOR LIPID SCREENING FOR CARDIOVASCULAR DISEASE: ICD-10-CM

## 2019-07-24 DIAGNOSIS — N28.1 ACQUIRED BILATERAL RENAL CYSTS: Primary | ICD-10-CM

## 2019-07-24 DIAGNOSIS — E55.9 VITAMIN D DEFICIENCY: ICD-10-CM

## 2019-07-24 DIAGNOSIS — D64.9 ANEMIA, UNSPECIFIED TYPE: ICD-10-CM

## 2019-07-24 LAB — 25(OH)D3+25(OH)D2 SERPL-MCNC: 20 NG/ML (ref 30–96)

## 2019-07-24 PROCEDURE — 99999 PR PBB SHADOW E&M-EST. PATIENT-LVL III: ICD-10-PCS | Mod: PBBFAC,,, | Performed by: INTERNAL MEDICINE

## 2019-07-24 PROCEDURE — 99999 PR PBB SHADOW E&M-EST. PATIENT-LVL III: CPT | Mod: PBBFAC,,, | Performed by: INTERNAL MEDICINE

## 2019-07-24 PROCEDURE — 99214 PR OFFICE/OUTPT VISIT, EST, LEVL IV, 30-39 MIN: ICD-10-PCS | Mod: S$GLB,,, | Performed by: INTERNAL MEDICINE

## 2019-07-24 PROCEDURE — 82306 VITAMIN D 25 HYDROXY: CPT

## 2019-07-24 PROCEDURE — 1101F PT FALLS ASSESS-DOCD LE1/YR: CPT | Mod: CPTII,S$GLB,, | Performed by: INTERNAL MEDICINE

## 2019-07-24 PROCEDURE — 99214 OFFICE O/P EST MOD 30 MIN: CPT | Mod: S$GLB,,, | Performed by: INTERNAL MEDICINE

## 2019-07-24 PROCEDURE — 1101F PR PT FALLS ASSESS DOC 0-1 FALLS W/OUT INJ PAST YR: ICD-10-PCS | Mod: CPTII,S$GLB,, | Performed by: INTERNAL MEDICINE

## 2019-07-24 PROCEDURE — 36415 COLL VENOUS BLD VENIPUNCTURE: CPT

## 2019-07-24 RX ORDER — ACETAMINOPHEN 500 MG
1 TABLET ORAL DAILY
COMMUNITY
Start: 2019-07-24 | End: 2020-08-24

## 2019-07-24 RX ORDER — ALPRAZOLAM 0.25 MG/1
0.25 TABLET ORAL 3 TIMES DAILY PRN
Qty: 10 TABLET | Refills: 0 | Status: SHIPPED | OUTPATIENT
Start: 2019-07-24 | End: 2020-08-24

## 2019-07-24 NOTE — PROGRESS NOTES
"Subjective:   Patient ID: Nelsy Mckenna is a 73 y.o. female  Chief complaint:   Chief Complaint   Patient presents with    Annual Exam       HPI    Here for annual exam     Vit d def:   Completed rx dose for 3 months but never started otc daily     B renal cysts: followed by urology - lcv 7/19/19 - CT and US stable     CKD3: followed by nephrology - lcv with nephrology 3/2019  - labs stable     Osteopenia: on dexa in 2015 - had repeat 2017 through gyn per pt  mmg ordered through gyn - Neel Edwards and is utd f/u for annual     GERD: reports acid refluxes at time to post throat  - dependent on intake of food triggers like red sauce and red wine - taking ranitidine prn for sx and effective when needed   Drinking 1 cup coffee in am   No cp, sob, nasuea, diaphoresis vomiting or diarrhea or abd pian, weight loss     + Fear of flying   Going to trip and requesting rx for anxiety and fear of flying     Review of Systems    Objective:  Vitals:    07/24/19 1221   BP: 122/80   Pulse: 60   SpO2: 98%   Weight: 56.9 kg (125 lb 7.1 oz)   Height: 5' 3" (1.6 m)     Body mass index is 22.22 kg/m².    Physical Exam   Constitutional: She is oriented to person, place, and time. She appears well-developed and well-nourished.   HENT:   Head: Normocephalic and atraumatic.   Right Ear: External ear normal.   Left Ear: External ear normal.   Nose: Nose normal.   Mouth/Throat: Oropharynx is clear and moist. No oropharyngeal exudate.   No carotid bruits   Eyes: Conjunctivae and EOM are normal.   Neck: Neck supple. No thyromegaly present.   Cardiovascular: Normal rate, regular rhythm and intact distal pulses.   Pulmonary/Chest: Effort normal and breath sounds normal.   Abdominal: Soft. Bowel sounds are normal.   Musculoskeletal: She exhibits no edema or tenderness.   Lymphadenopathy:     She has no cervical adenopathy.   Neurological: She is alert and oriented to person, place, and time.   Skin: Skin is warm and dry.   Psychiatric: Her behavior " is normal. Thought content normal.   Vitals reviewed.      Assessment:  1. Acquired bilateral renal cysts    2. Osteopenia, unspecified location    3. Vitamin D deficiency    4. Encounter for lipid screening for cardiovascular disease    5. Anemia, unspecified type    6. CKD (chronic kidney disease) stage 3, GFR 30-59 ml/min    7. Fear of flying        Plan:  Nelsy was seen today for annual exam.    Diagnoses and all orders for this visit:    Acquired bilateral renal cysts  -     TSH; Future  -     CBC auto differential; Future  -     Comprehensive metabolic panel; Future  Stable - followed by urology and renal     Osteopenia, unspecified location  -     Vitamin D; Future  -     TSH; Future  rec otc supplement of calcium 1200mg and vit d 800u divided into 2 doses daily along with reg exercise    Vitamin D deficiency  -     Vitamin D; Future  -     TSH; Future  -     cholecalciferol, vitamin D3, (VITAMIN D3) 2,000 unit Cap; Take 1 capsule (2,000 Units total) by mouth once daily.    Encounter for lipid screening for cardiovascular disease  -     Lipid panel; Future    Anemia, unspecified type  -     TSH; Future    CKD (chronic kidney disease) stage 3, GFR 30-59 ml/min  Stable, avoid nsaids     Fear of flying:   approp use of med reviewed   -     ALPRAZolam (XANAX) 0.25 MG tablet; Take 1 tablet (0.25 mg total) by mouth 3 (three) times daily as needed for Anxiety.    Health Maintenance   Topic Date Due    Influenza Vaccine  08/01/2019    DEXA SCAN  07/10/2020    Mammogram  11/27/2020    TETANUS VACCINE  07/12/2023    Lipid Panel  12/18/2023    Colonoscopy  02/14/2029    Hepatitis C Screening  Completed    Pneumococcal Vaccine (65+ Low/Medium Risk)  Completed

## 2019-07-25 ENCOUNTER — TELEPHONE (OUTPATIENT)
Dept: INTERNAL MEDICINE | Facility: CLINIC | Age: 74
End: 2019-07-25

## 2019-07-25 DIAGNOSIS — E55.9 VITAMIN D DEFICIENCY: Primary | ICD-10-CM

## 2019-07-25 RX ORDER — ERGOCALCIFEROL 1.25 MG/1
50000 CAPSULE ORAL
Qty: 12 CAPSULE | Refills: 0 | Status: SHIPPED | OUTPATIENT
Start: 2019-07-25 | End: 2020-08-24

## 2019-07-25 NOTE — TELEPHONE ENCOUNTER
Message sent to pt via my chart with lab results and updates to plan.     Please arrange vit d lab in 3 months

## 2020-03-03 ENCOUNTER — PATIENT OUTREACH (OUTPATIENT)
Dept: ADMINISTRATIVE | Facility: OTHER | Age: 75
End: 2020-03-03

## 2020-03-03 NOTE — PROGRESS NOTES
Chart reviewed.   Immunizations: Triggered Imm Registry     Orders placed: n/a  Upcoming appts to satisfy ERICA topics: n/a

## 2020-03-04 ENCOUNTER — OFFICE VISIT (OUTPATIENT)
Dept: OPTOMETRY | Facility: CLINIC | Age: 75
End: 2020-03-04
Payer: MEDICARE

## 2020-03-04 DIAGNOSIS — H52.13 MYOPIA WITH PRESBYOPIA, BILATERAL: Primary | ICD-10-CM

## 2020-03-04 DIAGNOSIS — H10.13 ALLERGIC CONJUNCTIVITIS OF BOTH EYES: ICD-10-CM

## 2020-03-04 DIAGNOSIS — H52.13 MYOPIA WITH PRESBYOPIA, BILATERAL: ICD-10-CM

## 2020-03-04 DIAGNOSIS — H25.13 NUCLEAR SCLEROSIS, BILATERAL: Primary | ICD-10-CM

## 2020-03-04 DIAGNOSIS — H52.4 MYOPIA WITH PRESBYOPIA, BILATERAL: Primary | ICD-10-CM

## 2020-03-04 DIAGNOSIS — H52.4 MYOPIA WITH PRESBYOPIA, BILATERAL: ICD-10-CM

## 2020-03-04 PROCEDURE — 99999 PR PBB SHADOW E&M-EST. PATIENT-LVL II: CPT | Mod: PBBFAC,,, | Performed by: OPTOMETRIST

## 2020-03-04 PROCEDURE — 92015 DETERMINE REFRACTIVE STATE: CPT | Mod: S$GLB,,, | Performed by: OPTOMETRIST

## 2020-03-04 PROCEDURE — 92310 PR CONTACT LENS FITTING (NO CHANGE): ICD-10-PCS | Mod: CSM,,, | Performed by: OPTOMETRIST

## 2020-03-04 PROCEDURE — 92014 COMPRE OPH EXAM EST PT 1/>: CPT | Mod: S$GLB,,, | Performed by: OPTOMETRIST

## 2020-03-04 PROCEDURE — 99499 UNLISTED E&M SERVICE: CPT | Mod: S$GLB,,, | Performed by: OPTOMETRIST

## 2020-03-04 PROCEDURE — 99999 PR PBB SHADOW E&M-EST. PATIENT-LVL II: ICD-10-PCS | Mod: PBBFAC,,, | Performed by: OPTOMETRIST

## 2020-03-04 PROCEDURE — 92015 PR REFRACTION: ICD-10-PCS | Mod: S$GLB,,, | Performed by: OPTOMETRIST

## 2020-03-04 PROCEDURE — 92014 PR EYE EXAM, EST PATIENT,COMPREHESV: ICD-10-PCS | Mod: S$GLB,,, | Performed by: OPTOMETRIST

## 2020-03-04 PROCEDURE — 92310 CONTACT LENS FITTING OU: CPT | Mod: CSM,,, | Performed by: OPTOMETRIST

## 2020-03-04 PROCEDURE — 99499 NO LOS: ICD-10-PCS | Mod: S$GLB,,, | Performed by: OPTOMETRIST

## 2020-03-04 NOTE — PROGRESS NOTES
HPI     Last eye exam was 2/7/19 with   Pt here for routine eye exam.    Pt states that she is doing well  Pt states that she takes contact out every night, and replace every day.  Pt states that her contact is still doing okay.  Pt states that she does not normally wear her glasses, but still will take   a glasses rx if needed.  Patient denies diplopia, headaches, flashes/floaters, and pain.  No eye drops, and no eye sx.    Hemoglobin A1C       Date                     Value               Ref Range             Status                10/31/2012               5.3                 4.0 - 6.2 %           Final                    Last edited by Rajani Pacheco on 3/4/2020  2:00 PM. (History)            Assessment /Plan     For exam results, see Encounter Report.    Nuclear sclerosis, bilateral    Allergic conjunctivitis of both eyes    Myopia with presbyopia, bilateral          1.  Educated on cataracts and affects on vision.  Patient ok with vision for now.  Monitor.  2.  Pataday QD OU.  Patient reports inner canthus itching OU once a month.  Usually lasts a few days and then resolves.  Educated pt to log incident--need to figure out what instigates it.  Will call in rx for Maxitrol if needed.  3.  Distance and contact lens rx given.

## 2020-08-24 ENCOUNTER — OFFICE VISIT (OUTPATIENT)
Dept: OPTOMETRY | Facility: CLINIC | Age: 75
End: 2020-08-24
Payer: MEDICARE

## 2020-08-24 DIAGNOSIS — H57.11 PAIN OF RIGHT EYE: Primary | ICD-10-CM

## 2020-08-24 PROCEDURE — 99999 PR PBB SHADOW E&M-EST. PATIENT-LVL III: ICD-10-PCS | Mod: PBBFAC,,, | Performed by: OPTOMETRIST

## 2020-08-24 PROCEDURE — 92012 INTRM OPH EXAM EST PATIENT: CPT | Mod: S$GLB,,, | Performed by: OPTOMETRIST

## 2020-08-24 PROCEDURE — 92012 PR EYE EXAM, EST PATIENT,INTERMED: ICD-10-PCS | Mod: S$GLB,,, | Performed by: OPTOMETRIST

## 2020-08-24 PROCEDURE — 99999 PR PBB SHADOW E&M-EST. PATIENT-LVL III: CPT | Mod: PBBFAC,,, | Performed by: OPTOMETRIST

## 2020-08-24 RX ORDER — A/SINGAPORE/GP1908/2015 IVR-180 (AN A/MICHIGAN/45/2015 (H1N1)PDM09-LIKE VIRUS, A/HONG KONG/4801/2014, NYMC X-263B (H3N2) (AN A/HONG KONG/4801/2014-LIKE VIRUS), AND B/BRISBANE/60/2008, WILD TYPE (A B/BRISBANE/60/2008-LIKE VIRUS) 15; 15; 15 UG/.5ML; UG/.5ML; UG/.5ML
INJECTION, SUSPENSION INTRAMUSCULAR
COMMUNITY
Start: 2020-08-21 | End: 2020-08-24 | Stop reason: ALTCHOICE

## 2020-08-24 RX ORDER — NEOMYCIN SULFATE, POLYMYXIN B SULFATE AND DEXAMETHASONE 3.5; 10000; 1 MG/ML; [USP'U]/ML; MG/ML
SUSPENSION/ DROPS OPHTHALMIC
Qty: 5 ML | Refills: 0 | Status: SHIPPED | OUTPATIENT
Start: 2020-08-24 | End: 2021-02-10

## 2020-08-24 NOTE — PROGRESS NOTES
HPI     Last eye exam was 3/4/20 with Dr. Staley.  Patient states poked OD while sleeping Thursday night. Hasn't worn SCL's   since then. FB sensation when blinking. OD has been watery too. Pain is   getting a little better. Hasn't used any drops since it started. OS is   fine.       Last edited by Kandace Carrion on 8/24/2020  1:40 PM. (History)            Assessment /Plan     For exam results, see Encounter Report.    Pain of right eye  -     neomycin-polymyxin-dexamethasone (MAXITROL) 3.5mg/mL-10,000 unit/mL-0.1 % DrpS; One drop in the left eye: 3x/day x 3 days, 2x/day x 2 days, 1x/day x 1 day, then stop.  Dispense: 5 mL; Refill: 0            1.  Tapering dose of Maxitrol: 3x/day x 3 days, 2x/day x 2 days, 1x/day x 1 day, then stop.  Poked self in eye while sleeping.  No foreign body found.  Tiny area of corneal staining superior.  No signs of iritis.  Remain out of contact until eye is back to normal.   RTC as scheduled of sooner if any problems.

## 2020-10-05 ENCOUNTER — PATIENT MESSAGE (OUTPATIENT)
Dept: ADMINISTRATIVE | Facility: HOSPITAL | Age: 75
End: 2020-10-05

## 2020-12-29 ENCOUNTER — TELEPHONE (OUTPATIENT)
Dept: INTERNAL MEDICINE | Facility: CLINIC | Age: 75
End: 2020-12-29

## 2021-01-04 ENCOUNTER — IMMUNIZATION (OUTPATIENT)
Dept: INTERNAL MEDICINE | Facility: CLINIC | Age: 76
End: 2021-01-04
Payer: MEDICARE

## 2021-01-04 DIAGNOSIS — Z23 NEED FOR VACCINATION: ICD-10-CM

## 2021-01-04 PROCEDURE — 91300 COVID-19, MRNA, LNP-S, PF, 30 MCG/0.3 ML DOSE VACCINE: CPT | Mod: PBBFAC | Performed by: FAMILY MEDICINE

## 2021-01-05 ENCOUNTER — PATIENT MESSAGE (OUTPATIENT)
Dept: ADMINISTRATIVE | Facility: HOSPITAL | Age: 76
End: 2021-01-05

## 2021-01-07 ENCOUNTER — PATIENT OUTREACH (OUTPATIENT)
Dept: ADMINISTRATIVE | Facility: HOSPITAL | Age: 76
End: 2021-01-07

## 2021-01-07 DIAGNOSIS — Z78.0 POSTMENOPAUSAL: Primary | ICD-10-CM

## 2021-01-07 DIAGNOSIS — Z12.31 ENCOUNTER FOR SCREENING MAMMOGRAM FOR BREAST CANCER: ICD-10-CM

## 2021-01-26 ENCOUNTER — IMMUNIZATION (OUTPATIENT)
Dept: INTERNAL MEDICINE | Facility: CLINIC | Age: 76
End: 2021-01-26
Payer: MEDICARE

## 2021-01-26 DIAGNOSIS — Z23 NEED FOR VACCINATION: Primary | ICD-10-CM

## 2021-01-26 PROCEDURE — 91300 COVID-19, MRNA, LNP-S, PF, 30 MCG/0.3 ML DOSE VACCINE: CPT | Mod: PBBFAC | Performed by: FAMILY MEDICINE

## 2021-01-26 PROCEDURE — 0002A COVID-19, MRNA, LNP-S, PF, 30 MCG/0.3 ML DOSE VACCINE: CPT | Mod: PBBFAC | Performed by: FAMILY MEDICINE

## 2021-02-05 ENCOUNTER — HOSPITAL ENCOUNTER (OUTPATIENT)
Dept: RADIOLOGY | Facility: OTHER | Age: 76
Discharge: HOME | End: 2021-02-05
Attending: INTERNAL MEDICINE
Payer: MEDICARE

## 2021-02-05 DIAGNOSIS — Z78.0 POSTMENOPAUSAL: ICD-10-CM

## 2021-02-05 PROCEDURE — 77080 DXA BONE DENSITY AXIAL: CPT | Mod: 26,,, | Performed by: RADIOLOGY

## 2021-02-05 PROCEDURE — 77080 DEXA BONE DENSITY SPINE HIP: ICD-10-PCS | Mod: 26,,, | Performed by: RADIOLOGY

## 2021-02-05 PROCEDURE — 77080 DXA BONE DENSITY AXIAL: CPT | Mod: TC

## 2021-02-09 ENCOUNTER — HOSPITAL ENCOUNTER (OUTPATIENT)
Dept: RADIOLOGY | Facility: OTHER | Age: 76
Discharge: HOME | End: 2021-02-09
Attending: INTERNAL MEDICINE
Payer: MEDICARE

## 2021-02-09 DIAGNOSIS — Z12.31 ENCOUNTER FOR SCREENING MAMMOGRAM FOR BREAST CANCER: ICD-10-CM

## 2021-02-09 PROCEDURE — 77063 MAMMO DIGITAL SCREENING BILAT WITH TOMO: ICD-10-PCS | Mod: 26,,, | Performed by: RADIOLOGY

## 2021-02-09 PROCEDURE — 77067 MAMMO DIGITAL SCREENING BILAT WITH TOMO: ICD-10-PCS | Mod: 26,,, | Performed by: RADIOLOGY

## 2021-02-09 PROCEDURE — 77063 BREAST TOMOSYNTHESIS BI: CPT | Mod: 26,,, | Performed by: RADIOLOGY

## 2021-02-09 PROCEDURE — 77067 SCR MAMMO BI INCL CAD: CPT | Mod: 26,,, | Performed by: RADIOLOGY

## 2021-02-09 PROCEDURE — 77067 SCR MAMMO BI INCL CAD: CPT | Mod: TC

## 2021-02-10 ENCOUNTER — OFFICE VISIT (OUTPATIENT)
Dept: INTERNAL MEDICINE | Facility: CLINIC | Age: 76
End: 2021-02-10
Attending: INTERNAL MEDICINE
Payer: MEDICARE

## 2021-02-10 VITALS
OXYGEN SATURATION: 95 % | SYSTOLIC BLOOD PRESSURE: 116 MMHG | BODY MASS INDEX: 23.51 KG/M2 | DIASTOLIC BLOOD PRESSURE: 84 MMHG | HEIGHT: 63 IN | WEIGHT: 132.69 LBS | HEART RATE: 64 BPM

## 2021-02-10 DIAGNOSIS — F40.243 FEAR OF FLYING: ICD-10-CM

## 2021-02-10 DIAGNOSIS — M81.8 OTHER OSTEOPOROSIS, UNSPECIFIED PATHOLOGICAL FRACTURE PRESENCE: ICD-10-CM

## 2021-02-10 DIAGNOSIS — E55.9 VITAMIN D DEFICIENCY: ICD-10-CM

## 2021-02-10 DIAGNOSIS — N28.1 ACQUIRED BILATERAL RENAL CYSTS: Primary | ICD-10-CM

## 2021-02-10 DIAGNOSIS — Z13.6 ENCOUNTER FOR LIPID SCREENING FOR CARDIOVASCULAR DISEASE: ICD-10-CM

## 2021-02-10 DIAGNOSIS — Z13.220 ENCOUNTER FOR LIPID SCREENING FOR CARDIOVASCULAR DISEASE: ICD-10-CM

## 2021-02-10 DIAGNOSIS — N18.30 STAGE 3 CHRONIC KIDNEY DISEASE, UNSPECIFIED WHETHER STAGE 3A OR 3B CKD: ICD-10-CM

## 2021-02-10 PROCEDURE — 3288F PR FALLS RISK ASSESSMENT DOCUMENTED: ICD-10-PCS | Mod: CPTII,S$GLB,, | Performed by: INTERNAL MEDICINE

## 2021-02-10 PROCEDURE — 1159F MED LIST DOCD IN RCRD: CPT | Mod: S$GLB,,, | Performed by: INTERNAL MEDICINE

## 2021-02-10 PROCEDURE — 3008F PR BODY MASS INDEX (BMI) DOCUMENTED: ICD-10-PCS | Mod: CPTII,S$GLB,, | Performed by: INTERNAL MEDICINE

## 2021-02-10 PROCEDURE — 1101F PR PT FALLS ASSESS DOC 0-1 FALLS W/OUT INJ PAST YR: ICD-10-PCS | Mod: CPTII,S$GLB,, | Performed by: INTERNAL MEDICINE

## 2021-02-10 PROCEDURE — 3008F BODY MASS INDEX DOCD: CPT | Mod: CPTII,S$GLB,, | Performed by: INTERNAL MEDICINE

## 2021-02-10 PROCEDURE — 99214 OFFICE O/P EST MOD 30 MIN: CPT | Mod: S$GLB,,, | Performed by: INTERNAL MEDICINE

## 2021-02-10 PROCEDURE — 99999 PR PBB SHADOW E&M-EST. PATIENT-LVL IV: ICD-10-PCS | Mod: PBBFAC,,, | Performed by: INTERNAL MEDICINE

## 2021-02-10 PROCEDURE — 3288F FALL RISK ASSESSMENT DOCD: CPT | Mod: CPTII,S$GLB,, | Performed by: INTERNAL MEDICINE

## 2021-02-10 PROCEDURE — 1159F PR MEDICATION LIST DOCUMENTED IN MEDICAL RECORD: ICD-10-PCS | Mod: S$GLB,,, | Performed by: INTERNAL MEDICINE

## 2021-02-10 PROCEDURE — 1101F PT FALLS ASSESS-DOCD LE1/YR: CPT | Mod: CPTII,S$GLB,, | Performed by: INTERNAL MEDICINE

## 2021-02-10 PROCEDURE — 99214 PR OFFICE/OUTPT VISIT, EST, LEVL IV, 30-39 MIN: ICD-10-PCS | Mod: S$GLB,,, | Performed by: INTERNAL MEDICINE

## 2021-02-10 PROCEDURE — 1126F AMNT PAIN NOTED NONE PRSNT: CPT | Mod: S$GLB,,, | Performed by: INTERNAL MEDICINE

## 2021-02-10 PROCEDURE — 1126F PR PAIN SEVERITY QUANTIFIED, NO PAIN PRESENT: ICD-10-PCS | Mod: S$GLB,,, | Performed by: INTERNAL MEDICINE

## 2021-02-10 PROCEDURE — 99999 PR PBB SHADOW E&M-EST. PATIENT-LVL IV: CPT | Mod: PBBFAC,,, | Performed by: INTERNAL MEDICINE

## 2021-02-11 ENCOUNTER — PATIENT MESSAGE (OUTPATIENT)
Dept: INTERNAL MEDICINE | Facility: CLINIC | Age: 76
End: 2021-02-11

## 2021-02-11 DIAGNOSIS — Z71.9 HEALTH EDUCATION/COUNSELING: Primary | ICD-10-CM

## 2021-02-11 RX ORDER — ERGOCALCIFEROL 1.25 MG/1
50000 CAPSULE ORAL
Qty: 12 CAPSULE | Refills: 0 | Status: CANCELLED | OUTPATIENT
Start: 2021-02-11

## 2021-02-12 ENCOUNTER — LAB VISIT (OUTPATIENT)
Dept: LAB | Facility: OTHER | Age: 76
End: 2021-02-12
Attending: INTERNAL MEDICINE
Payer: MEDICARE

## 2021-02-12 DIAGNOSIS — Z13.220 ENCOUNTER FOR LIPID SCREENING FOR CARDIOVASCULAR DISEASE: ICD-10-CM

## 2021-02-12 DIAGNOSIS — M81.8 OTHER OSTEOPOROSIS, UNSPECIFIED PATHOLOGICAL FRACTURE PRESENCE: ICD-10-CM

## 2021-02-12 DIAGNOSIS — Z13.6 ENCOUNTER FOR LIPID SCREENING FOR CARDIOVASCULAR DISEASE: ICD-10-CM

## 2021-02-12 DIAGNOSIS — N18.30 STAGE 3 CHRONIC KIDNEY DISEASE, UNSPECIFIED WHETHER STAGE 3A OR 3B CKD: ICD-10-CM

## 2021-02-12 LAB
25(OH)D3+25(OH)D2 SERPL-MCNC: 22 NG/ML (ref 30–96)
ALBUMIN SERPL BCP-MCNC: 3.8 G/DL (ref 3.5–5.2)
ALP SERPL-CCNC: 83 U/L (ref 55–135)
ALT SERPL W/O P-5'-P-CCNC: 12 U/L (ref 10–44)
ANION GAP SERPL CALC-SCNC: 8 MMOL/L (ref 8–16)
AST SERPL-CCNC: 16 U/L (ref 10–40)
BASOPHILS # BLD AUTO: 0.05 K/UL (ref 0–0.2)
BASOPHILS NFR BLD: 0.8 % (ref 0–1.9)
BILIRUB SERPL-MCNC: 0.6 MG/DL (ref 0.1–1)
BUN SERPL-MCNC: 30 MG/DL (ref 8–23)
CALCIUM SERPL-MCNC: 10.1 MG/DL (ref 8.7–10.5)
CHLORIDE SERPL-SCNC: 104 MMOL/L (ref 95–110)
CHOLEST SERPL-MCNC: 190 MG/DL (ref 120–199)
CHOLEST/HDLC SERPL: 2.7 {RATIO} (ref 2–5)
CO2 SERPL-SCNC: 27 MMOL/L (ref 23–29)
CREAT SERPL-MCNC: 1.5 MG/DL (ref 0.5–1.4)
DIFFERENTIAL METHOD: ABNORMAL
EOSINOPHIL # BLD AUTO: 0.2 K/UL (ref 0–0.5)
EOSINOPHIL NFR BLD: 3.5 % (ref 0–8)
ERYTHROCYTE [DISTWIDTH] IN BLOOD BY AUTOMATED COUNT: 12 % (ref 11.5–14.5)
EST. GFR  (AFRICAN AMERICAN): 40 ML/MIN/1.73 M^2
EST. GFR  (NON AFRICAN AMERICAN): 34 ML/MIN/1.73 M^2
GLUCOSE SERPL-MCNC: 103 MG/DL (ref 70–110)
HCT VFR BLD AUTO: 37.1 % (ref 37–48.5)
HDLC SERPL-MCNC: 70 MG/DL (ref 40–75)
HDLC SERPL: 36.8 % (ref 20–50)
HGB BLD-MCNC: 11.9 G/DL (ref 12–16)
IMM GRANULOCYTES # BLD AUTO: 0.04 K/UL (ref 0–0.04)
IMM GRANULOCYTES NFR BLD AUTO: 0.7 % (ref 0–0.5)
LDLC SERPL CALC-MCNC: 103.4 MG/DL (ref 63–159)
LYMPHOCYTES # BLD AUTO: 1.2 K/UL (ref 1–4.8)
LYMPHOCYTES NFR BLD: 19.1 % (ref 18–48)
MCH RBC QN AUTO: 30.6 PG (ref 27–31)
MCHC RBC AUTO-ENTMCNC: 32.1 G/DL (ref 32–36)
MCV RBC AUTO: 95 FL (ref 82–98)
MONOCYTES # BLD AUTO: 0.3 K/UL (ref 0.3–1)
MONOCYTES NFR BLD: 5.6 % (ref 4–15)
NEUTROPHILS # BLD AUTO: 4.3 K/UL (ref 1.8–7.7)
NEUTROPHILS NFR BLD: 70.3 % (ref 38–73)
NONHDLC SERPL-MCNC: 120 MG/DL
NRBC BLD-RTO: 0 /100 WBC
PLATELET # BLD AUTO: 205 K/UL (ref 150–350)
PMV BLD AUTO: 10.1 FL (ref 9.2–12.9)
POTASSIUM SERPL-SCNC: 4 MMOL/L (ref 3.5–5.1)
PROT SERPL-MCNC: 7 G/DL (ref 6–8.4)
PTH-INTACT SERPL-MCNC: 53.1 PG/ML (ref 9–77)
RBC # BLD AUTO: 3.89 M/UL (ref 4–5.4)
SODIUM SERPL-SCNC: 139 MMOL/L (ref 136–145)
TRIGL SERPL-MCNC: 83 MG/DL (ref 30–150)
TSH SERPL DL<=0.005 MIU/L-ACNC: 1.99 UIU/ML (ref 0.4–4)
WBC # BLD AUTO: 6.06 K/UL (ref 3.9–12.7)

## 2021-02-12 PROCEDURE — 80061 LIPID PANEL: CPT

## 2021-02-12 PROCEDURE — 80053 COMPREHEN METABOLIC PANEL: CPT

## 2021-02-12 PROCEDURE — 83970 ASSAY OF PARATHORMONE: CPT

## 2021-02-12 PROCEDURE — 36415 COLL VENOUS BLD VENIPUNCTURE: CPT

## 2021-02-12 PROCEDURE — 82306 VITAMIN D 25 HYDROXY: CPT

## 2021-02-12 PROCEDURE — 84443 ASSAY THYROID STIM HORMONE: CPT

## 2021-02-12 PROCEDURE — 85025 COMPLETE CBC W/AUTO DIFF WBC: CPT

## 2021-02-15 PROBLEM — M81.0 OSTEOPOROSIS: Status: ACTIVE | Noted: 2021-02-15

## 2021-02-17 ENCOUNTER — CLINICAL SUPPORT (OUTPATIENT)
Dept: INTERNAL MEDICINE | Facility: CLINIC | Age: 76
End: 2021-02-17
Payer: MEDICARE

## 2021-02-17 ENCOUNTER — HOSPITAL ENCOUNTER (OUTPATIENT)
Dept: RADIOLOGY | Facility: OTHER | Age: 76
Discharge: HOME | End: 2021-02-17
Attending: INTERNAL MEDICINE
Payer: MEDICARE

## 2021-02-17 VITALS — SYSTOLIC BLOOD PRESSURE: 130 MMHG | HEART RATE: 69 BPM | DIASTOLIC BLOOD PRESSURE: 80 MMHG | OXYGEN SATURATION: 98 %

## 2021-02-17 DIAGNOSIS — N18.30 STAGE 3 CHRONIC KIDNEY DISEASE, UNSPECIFIED WHETHER STAGE 3A OR 3B CKD: ICD-10-CM

## 2021-02-17 PROCEDURE — 76770 US EXAM ABDO BACK WALL COMP: CPT | Mod: TC

## 2021-02-17 PROCEDURE — 76770 US EXAM ABDO BACK WALL COMP: CPT | Mod: 26,,, | Performed by: RADIOLOGY

## 2021-02-17 PROCEDURE — 99999 PR PBB SHADOW E&M-EST. PATIENT-LVL II: CPT | Mod: PBBFAC,,,

## 2021-02-17 PROCEDURE — 99999 PR PBB SHADOW E&M-EST. PATIENT-LVL II: ICD-10-PCS | Mod: PBBFAC,,,

## 2021-02-17 PROCEDURE — 76770 US RETROPERITONEAL COMPLETE: ICD-10-PCS | Mod: 26,,, | Performed by: RADIOLOGY

## 2021-02-18 ENCOUNTER — IMMUNIZATION (OUTPATIENT)
Dept: PHARMACY | Facility: CLINIC | Age: 76
End: 2021-02-18
Payer: MEDICARE

## 2021-02-18 ENCOUNTER — LAB VISIT (OUTPATIENT)
Dept: LAB | Facility: HOSPITAL | Age: 76
End: 2021-02-18
Attending: PSYCHIATRY & NEUROLOGY

## 2021-02-18 DIAGNOSIS — Z71.9 HEALTH EDUCATION/COUNSELING: ICD-10-CM

## 2021-02-18 LAB
ALBUMIN SERPL BCP-MCNC: 3.9 G/DL (ref 3.5–5.2)
ALP SERPL-CCNC: 82 U/L (ref 55–135)
ALT SERPL W/O P-5'-P-CCNC: 8 U/L (ref 10–44)
ANION GAP SERPL CALC-SCNC: 6 MMOL/L (ref 8–16)
AST SERPL-CCNC: 15 U/L (ref 10–40)
BILIRUB SERPL-MCNC: 0.4 MG/DL (ref 0.1–1)
BUN SERPL-MCNC: 39 MG/DL (ref 8–23)
CALCIUM SERPL-MCNC: 9.8 MG/DL (ref 8.7–10.5)
CHLORIDE SERPL-SCNC: 107 MMOL/L (ref 95–110)
CHOLEST SERPL-MCNC: 194 MG/DL (ref 120–199)
CHOLEST/HDLC SERPL: 3.1 {RATIO} (ref 2–5)
CO2 SERPL-SCNC: 25 MMOL/L (ref 23–29)
CREAT SERPL-MCNC: 1.6 MG/DL (ref 0.5–1.4)
CRP SERPL-MCNC: 6.03 MG/L (ref 0–3.19)
ERYTHROCYTE [DISTWIDTH] IN BLOOD BY AUTOMATED COUNT: 12.3 % (ref 11.5–14.5)
ERYTHROCYTE [SEDIMENTATION RATE] IN BLOOD BY WESTERGREN METHOD: 12 MM/HR (ref 0–36)
EST. GFR  (AFRICAN AMERICAN): 36.6 ML/MIN/1.73 M^2
EST. GFR  (NON AFRICAN AMERICAN): 31.7 ML/MIN/1.73 M^2
GGT SERPL-CCNC: 64 U/L (ref 8–55)
GLUCOSE SERPL-MCNC: 97 MG/DL (ref 70–110)
HCT VFR BLD AUTO: 36.5 % (ref 37–48.5)
HDLC SERPL-MCNC: 63 MG/DL (ref 40–75)
HDLC SERPL: 32.5 % (ref 20–50)
HGB BLD-MCNC: 11.8 G/DL (ref 12–16)
LDLC SERPL CALC-MCNC: 105.8 MG/DL (ref 63–159)
MCH RBC QN AUTO: 31.2 PG (ref 27–31)
MCHC RBC AUTO-ENTMCNC: 32.3 G/DL (ref 32–36)
MCV RBC AUTO: 97 FL (ref 82–98)
NONHDLC SERPL-MCNC: 131 MG/DL
PLATELET # BLD AUTO: 184 K/UL (ref 150–350)
PMV BLD AUTO: 11.1 FL (ref 9.2–12.9)
POTASSIUM SERPL-SCNC: 4.5 MMOL/L (ref 3.5–5.1)
PROT SERPL-MCNC: 7.1 G/DL (ref 6–8.4)
RBC # BLD AUTO: 3.78 M/UL (ref 4–5.4)
SODIUM SERPL-SCNC: 138 MMOL/L (ref 136–145)
TRIGL SERPL-MCNC: 126 MG/DL (ref 30–150)
WBC # BLD AUTO: 7.23 K/UL (ref 3.9–12.7)

## 2021-02-18 PROCEDURE — 82607 VITAMIN B-12: CPT

## 2021-02-18 PROCEDURE — 85652 RBC SED RATE AUTOMATED: CPT

## 2021-02-18 PROCEDURE — 80061 LIPID PANEL: CPT

## 2021-02-18 PROCEDURE — 82977 ASSAY OF GGT: CPT

## 2021-02-18 PROCEDURE — 86141 C-REACTIVE PROTEIN HS: CPT

## 2021-02-18 PROCEDURE — 82746 ASSAY OF FOLIC ACID SERUM: CPT

## 2021-02-18 PROCEDURE — 36415 COLL VENOUS BLD VENIPUNCTURE: CPT

## 2021-02-18 PROCEDURE — 80053 COMPREHEN METABOLIC PANEL: CPT

## 2021-02-18 PROCEDURE — 85027 COMPLETE CBC AUTOMATED: CPT

## 2021-02-19 LAB
FOLATE SERPL-MCNC: 7.4 NG/ML (ref 4–24)
VIT B12 SERPL-MCNC: 365 PG/ML (ref 210–950)

## 2021-02-22 ENCOUNTER — TELEPHONE (OUTPATIENT)
Dept: INTERNAL MEDICINE | Facility: CLINIC | Age: 76
End: 2021-02-22

## 2021-02-22 DIAGNOSIS — D64.9 ANEMIA, UNSPECIFIED TYPE: ICD-10-CM

## 2021-02-22 DIAGNOSIS — E55.9 VITAMIN D DEFICIENCY: Primary | ICD-10-CM

## 2021-02-22 DIAGNOSIS — N17.9 AKI (ACUTE KIDNEY INJURY): ICD-10-CM

## 2021-02-22 DIAGNOSIS — M81.0 AGE-RELATED OSTEOPOROSIS WITHOUT CURRENT PATHOLOGICAL FRACTURE: ICD-10-CM

## 2021-02-22 RX ORDER — ERGOCALCIFEROL 1.25 MG/1
CAPSULE ORAL
Qty: 24 CAPSULE | Refills: 0 | Status: SHIPPED | OUTPATIENT
Start: 2021-02-22 | End: 2021-05-17

## 2021-02-22 RX ORDER — ACETAMINOPHEN 500 MG
1 TABLET ORAL DAILY
COMMUNITY
Start: 2021-02-22 | End: 2021-08-24

## 2021-02-22 NOTE — TELEPHONE ENCOUNTER
Message sent to pt via my chart with results and updates to plan.     Please schedule vit d in 3 months     Please schedule BMP and anemia labs in 1 week

## 2021-02-24 ENCOUNTER — OFFICE VISIT (OUTPATIENT)
Dept: NEPHROLOGY | Facility: CLINIC | Age: 76
End: 2021-02-24
Payer: MEDICARE

## 2021-02-24 VITALS
WEIGHT: 133.38 LBS | OXYGEN SATURATION: 97 % | HEIGHT: 63 IN | DIASTOLIC BLOOD PRESSURE: 80 MMHG | SYSTOLIC BLOOD PRESSURE: 126 MMHG | BODY MASS INDEX: 23.63 KG/M2 | HEART RATE: 69 BPM

## 2021-02-24 DIAGNOSIS — N17.9 AKI (ACUTE KIDNEY INJURY): ICD-10-CM

## 2021-02-24 DIAGNOSIS — N18.30 STAGE 3 CHRONIC KIDNEY DISEASE, UNSPECIFIED WHETHER STAGE 3A OR 3B CKD: Primary | ICD-10-CM

## 2021-02-24 DIAGNOSIS — D64.9 ANEMIA, UNSPECIFIED TYPE: ICD-10-CM

## 2021-02-24 DIAGNOSIS — R82.998 LEUKOCYTES IN URINE: ICD-10-CM

## 2021-02-24 PROCEDURE — 99214 OFFICE O/P EST MOD 30 MIN: CPT | Mod: S$GLB,,, | Performed by: NURSE PRACTITIONER

## 2021-02-24 PROCEDURE — 1126F AMNT PAIN NOTED NONE PRSNT: CPT | Mod: S$GLB,,, | Performed by: NURSE PRACTITIONER

## 2021-02-24 PROCEDURE — 3288F PR FALLS RISK ASSESSMENT DOCUMENTED: ICD-10-PCS | Mod: CPTII,S$GLB,, | Performed by: NURSE PRACTITIONER

## 2021-02-24 PROCEDURE — 3008F BODY MASS INDEX DOCD: CPT | Mod: CPTII,S$GLB,, | Performed by: NURSE PRACTITIONER

## 2021-02-24 PROCEDURE — 99999 PR PBB SHADOW E&M-EST. PATIENT-LVL IV: ICD-10-PCS | Mod: PBBFAC,,, | Performed by: NURSE PRACTITIONER

## 2021-02-24 PROCEDURE — 1101F PT FALLS ASSESS-DOCD LE1/YR: CPT | Mod: CPTII,S$GLB,, | Performed by: NURSE PRACTITIONER

## 2021-02-24 PROCEDURE — 99499 RISK ADDL DX/OHS AUDIT: ICD-10-PCS | Mod: S$GLB,,, | Performed by: NURSE PRACTITIONER

## 2021-02-24 PROCEDURE — 1126F PR PAIN SEVERITY QUANTIFIED, NO PAIN PRESENT: ICD-10-PCS | Mod: S$GLB,,, | Performed by: NURSE PRACTITIONER

## 2021-02-24 PROCEDURE — 1101F PR PT FALLS ASSESS DOC 0-1 FALLS W/OUT INJ PAST YR: ICD-10-PCS | Mod: CPTII,S$GLB,, | Performed by: NURSE PRACTITIONER

## 2021-02-24 PROCEDURE — 1159F MED LIST DOCD IN RCRD: CPT | Mod: S$GLB,,, | Performed by: NURSE PRACTITIONER

## 2021-02-24 PROCEDURE — 3008F PR BODY MASS INDEX (BMI) DOCUMENTED: ICD-10-PCS | Mod: CPTII,S$GLB,, | Performed by: NURSE PRACTITIONER

## 2021-02-24 PROCEDURE — 1159F PR MEDICATION LIST DOCUMENTED IN MEDICAL RECORD: ICD-10-PCS | Mod: S$GLB,,, | Performed by: NURSE PRACTITIONER

## 2021-02-24 PROCEDURE — 99214 PR OFFICE/OUTPT VISIT, EST, LEVL IV, 30-39 MIN: ICD-10-PCS | Mod: S$GLB,,, | Performed by: NURSE PRACTITIONER

## 2021-02-24 PROCEDURE — 99499 UNLISTED E&M SERVICE: CPT | Mod: S$GLB,,, | Performed by: NURSE PRACTITIONER

## 2021-02-24 PROCEDURE — 99999 PR PBB SHADOW E&M-EST. PATIENT-LVL IV: CPT | Mod: PBBFAC,,, | Performed by: NURSE PRACTITIONER

## 2021-02-24 PROCEDURE — 3288F FALL RISK ASSESSMENT DOCD: CPT | Mod: CPTII,S$GLB,, | Performed by: NURSE PRACTITIONER

## 2021-02-25 ENCOUNTER — PATIENT MESSAGE (OUTPATIENT)
Dept: INTERNAL MEDICINE | Facility: CLINIC | Age: 76
End: 2021-02-25

## 2021-02-25 ENCOUNTER — PATIENT MESSAGE (OUTPATIENT)
Dept: NEPHROLOGY | Facility: CLINIC | Age: 76
End: 2021-02-25

## 2021-03-04 ENCOUNTER — PATIENT MESSAGE (OUTPATIENT)
Dept: NEPHROLOGY | Facility: CLINIC | Age: 76
End: 2021-03-04

## 2021-03-04 ENCOUNTER — TELEPHONE (OUTPATIENT)
Dept: NEPHROLOGY | Facility: CLINIC | Age: 76
End: 2021-03-04

## 2021-03-08 ENCOUNTER — LAB VISIT (OUTPATIENT)
Dept: LAB | Facility: OTHER | Age: 76
End: 2021-03-08
Attending: INTERNAL MEDICINE
Payer: MEDICARE

## 2021-03-08 ENCOUNTER — CLINICAL SUPPORT (OUTPATIENT)
Dept: INTERNAL MEDICINE | Facility: CLINIC | Age: 76
End: 2021-03-08
Payer: MEDICARE

## 2021-03-08 ENCOUNTER — TELEPHONE (OUTPATIENT)
Dept: INTERNAL MEDICINE | Facility: CLINIC | Age: 76
End: 2021-03-08

## 2021-03-08 VITALS — OXYGEN SATURATION: 99 % | SYSTOLIC BLOOD PRESSURE: 122 MMHG | HEART RATE: 72 BPM | DIASTOLIC BLOOD PRESSURE: 76 MMHG

## 2021-03-08 DIAGNOSIS — D64.9 ANEMIA, UNSPECIFIED TYPE: ICD-10-CM

## 2021-03-08 DIAGNOSIS — M81.0 AGE-RELATED OSTEOPOROSIS WITHOUT CURRENT PATHOLOGICAL FRACTURE: ICD-10-CM

## 2021-03-08 DIAGNOSIS — N18.30 STAGE 3 CHRONIC KIDNEY DISEASE, UNSPECIFIED WHETHER STAGE 3A OR 3B CKD: ICD-10-CM

## 2021-03-08 DIAGNOSIS — E55.9 VITAMIN D DEFICIENCY: ICD-10-CM

## 2021-03-08 DIAGNOSIS — N17.9 AKI (ACUTE KIDNEY INJURY): ICD-10-CM

## 2021-03-08 LAB
25(OH)D3+25(OH)D2 SERPL-MCNC: 37 NG/ML (ref 30–96)
ALBUMIN SERPL BCP-MCNC: 3.5 G/DL (ref 3.5–5.2)
ANION GAP SERPL CALC-SCNC: 10 MMOL/L (ref 8–16)
ANION GAP SERPL CALC-SCNC: 11 MMOL/L (ref 8–16)
BASOPHILS # BLD AUTO: 0.04 K/UL (ref 0–0.2)
BASOPHILS NFR BLD: 0.4 % (ref 0–1.9)
BUN SERPL-MCNC: 30 MG/DL (ref 8–23)
BUN SERPL-MCNC: 30 MG/DL (ref 8–23)
CALCIUM SERPL-MCNC: 10.1 MG/DL (ref 8.7–10.5)
CALCIUM SERPL-MCNC: 10.1 MG/DL (ref 8.7–10.5)
CHLORIDE SERPL-SCNC: 105 MMOL/L (ref 95–110)
CHLORIDE SERPL-SCNC: 105 MMOL/L (ref 95–110)
CO2 SERPL-SCNC: 21 MMOL/L (ref 23–29)
CO2 SERPL-SCNC: 22 MMOL/L (ref 23–29)
CREAT SERPL-MCNC: 1.3 MG/DL (ref 0.5–1.4)
CREAT SERPL-MCNC: 1.3 MG/DL (ref 0.5–1.4)
DIFFERENTIAL METHOD: ABNORMAL
EOSINOPHIL # BLD AUTO: 0.2 K/UL (ref 0–0.5)
EOSINOPHIL NFR BLD: 1.9 % (ref 0–8)
ERYTHROCYTE [DISTWIDTH] IN BLOOD BY AUTOMATED COUNT: 11.4 % (ref 11.5–14.5)
EST. GFR  (AFRICAN AMERICAN): 47 ML/MIN/1.73 M^2
EST. GFR  (AFRICAN AMERICAN): 47 ML/MIN/1.73 M^2
EST. GFR  (NON AFRICAN AMERICAN): 41 ML/MIN/1.73 M^2
EST. GFR  (NON AFRICAN AMERICAN): 41 ML/MIN/1.73 M^2
FERRITIN SERPL-MCNC: 274 NG/ML (ref 20–300)
GLUCOSE SERPL-MCNC: 91 MG/DL (ref 70–110)
GLUCOSE SERPL-MCNC: 92 MG/DL (ref 70–110)
HCT VFR BLD AUTO: 33.8 % (ref 37–48.5)
HGB BLD-MCNC: 10.7 G/DL (ref 12–16)
IMM GRANULOCYTES # BLD AUTO: 0.07 K/UL (ref 0–0.04)
IMM GRANULOCYTES NFR BLD AUTO: 0.8 % (ref 0–0.5)
IRON SERPL-MCNC: 37 UG/DL (ref 30–160)
LDH SERPL L TO P-CCNC: 125 U/L (ref 110–260)
LYMPHOCYTES # BLD AUTO: 1.6 K/UL (ref 1–4.8)
LYMPHOCYTES NFR BLD: 18.1 % (ref 18–48)
MCH RBC QN AUTO: 29.6 PG (ref 27–31)
MCHC RBC AUTO-ENTMCNC: 31.7 G/DL (ref 32–36)
MCV RBC AUTO: 94 FL (ref 82–98)
MONOCYTES # BLD AUTO: 0.6 K/UL (ref 0.3–1)
MONOCYTES NFR BLD: 6.8 % (ref 4–15)
NEUTROPHILS # BLD AUTO: 6.5 K/UL (ref 1.8–7.7)
NEUTROPHILS NFR BLD: 72 % (ref 38–73)
NRBC BLD-RTO: 0 /100 WBC
PATH REV BLD -IMP: NORMAL
PHOSPHATE SERPL-MCNC: 3 MG/DL (ref 2.7–4.5)
PLATELET # BLD AUTO: 268 K/UL (ref 150–350)
PMV BLD AUTO: 10.2 FL (ref 9.2–12.9)
POTASSIUM SERPL-SCNC: 4.2 MMOL/L (ref 3.5–5.1)
POTASSIUM SERPL-SCNC: 4.3 MMOL/L (ref 3.5–5.1)
PTH-INTACT SERPL-MCNC: 85.9 PG/ML (ref 9–77)
RBC # BLD AUTO: 3.61 M/UL (ref 4–5.4)
SATURATED IRON: 13 % (ref 20–50)
SODIUM SERPL-SCNC: 137 MMOL/L (ref 136–145)
SODIUM SERPL-SCNC: 137 MMOL/L (ref 136–145)
TOTAL IRON BINDING CAPACITY: 296 UG/DL (ref 250–450)
TRANSFERRIN SERPL-MCNC: 200 MG/DL (ref 200–375)
WBC # BLD AUTO: 9.01 K/UL (ref 3.9–12.7)

## 2021-03-08 PROCEDURE — 82728 ASSAY OF FERRITIN: CPT | Performed by: INTERNAL MEDICINE

## 2021-03-08 PROCEDURE — 99999 PR PBB SHADOW E&M-EST. PATIENT-LVL I: CPT | Mod: PBBFAC,,,

## 2021-03-08 PROCEDURE — 82306 VITAMIN D 25 HYDROXY: CPT | Performed by: INTERNAL MEDICINE

## 2021-03-08 PROCEDURE — 36415 COLL VENOUS BLD VENIPUNCTURE: CPT | Performed by: INTERNAL MEDICINE

## 2021-03-08 PROCEDURE — 80069 RENAL FUNCTION PANEL: CPT | Performed by: NURSE PRACTITIONER

## 2021-03-08 PROCEDURE — 85060 PATHOLOGIST REVIEW: ICD-10-PCS | Mod: ,,, | Performed by: PATHOLOGY

## 2021-03-08 PROCEDURE — 83970 ASSAY OF PARATHORMONE: CPT | Performed by: NURSE PRACTITIONER

## 2021-03-08 PROCEDURE — 83615 LACTATE (LD) (LDH) ENZYME: CPT | Performed by: INTERNAL MEDICINE

## 2021-03-08 PROCEDURE — 85060 BLOOD SMEAR INTERPRETATION: CPT | Mod: ,,, | Performed by: PATHOLOGY

## 2021-03-08 PROCEDURE — 83540 ASSAY OF IRON: CPT | Performed by: INTERNAL MEDICINE

## 2021-03-08 PROCEDURE — 82668 ASSAY OF ERYTHROPOIETIN: CPT | Performed by: INTERNAL MEDICINE

## 2021-03-08 PROCEDURE — 80048 BASIC METABOLIC PNL TOTAL CA: CPT | Performed by: INTERNAL MEDICINE

## 2021-03-08 PROCEDURE — 99999 PR PBB SHADOW E&M-EST. PATIENT-LVL I: ICD-10-PCS | Mod: PBBFAC,,,

## 2021-03-08 PROCEDURE — 85025 COMPLETE CBC W/AUTO DIFF WBC: CPT | Performed by: INTERNAL MEDICINE

## 2021-03-11 LAB — EPO SERPL-ACNC: 6.4 MIU/ML (ref 2.6–18.5)

## 2021-03-15 ENCOUNTER — TELEPHONE (OUTPATIENT)
Dept: INTERNAL MEDICINE | Facility: CLINIC | Age: 76
End: 2021-03-15

## 2021-03-15 DIAGNOSIS — D64.9 ANEMIA, UNSPECIFIED TYPE: Primary | ICD-10-CM

## 2021-03-15 LAB — PATH REV BLD -IMP: NORMAL

## 2021-03-15 NOTE — TELEPHONE ENCOUNTER
Message sent to pt via my chart with results and updates to plan.   Please schedule appt with heme/onc for anemia evaluation

## 2021-03-16 DIAGNOSIS — Z71.9 HEALTH EDUCATION/COUNSELING: Primary | ICD-10-CM

## 2021-03-29 ENCOUNTER — HOSPITAL ENCOUNTER (OUTPATIENT)
Dept: RADIOLOGY | Facility: HOSPITAL | Age: 76
Discharge: HOME | End: 2021-03-29
Attending: UROLOGY
Payer: MEDICARE

## 2021-03-29 ENCOUNTER — OFFICE VISIT (OUTPATIENT)
Dept: UROLOGY | Facility: CLINIC | Age: 76
End: 2021-03-29
Payer: MEDICARE

## 2021-03-29 VITALS
BODY MASS INDEX: 22.88 KG/M2 | DIASTOLIC BLOOD PRESSURE: 69 MMHG | SYSTOLIC BLOOD PRESSURE: 144 MMHG | HEART RATE: 61 BPM | WEIGHT: 129.19 LBS

## 2021-03-29 DIAGNOSIS — N20.0 BILATERAL KIDNEY STONES: ICD-10-CM

## 2021-03-29 DIAGNOSIS — N18.30 STAGE 3 CHRONIC KIDNEY DISEASE, UNSPECIFIED WHETHER STAGE 3A OR 3B CKD: ICD-10-CM

## 2021-03-29 DIAGNOSIS — N20.0 BILATERAL KIDNEY STONES: Primary | ICD-10-CM

## 2021-03-29 DIAGNOSIS — N28.1 ACQUIRED BILATERAL RENAL CYSTS: ICD-10-CM

## 2021-03-29 PROCEDURE — 3288F FALL RISK ASSESSMENT DOCD: CPT | Mod: CPTII,S$GLB,, | Performed by: UROLOGY

## 2021-03-29 PROCEDURE — 3008F PR BODY MASS INDEX (BMI) DOCUMENTED: ICD-10-PCS | Mod: CPTII,S$GLB,, | Performed by: UROLOGY

## 2021-03-29 PROCEDURE — 99215 PR OFFICE/OUTPT VISIT, EST, LEVL V, 40-54 MIN: ICD-10-PCS | Mod: S$GLB,,, | Performed by: UROLOGY

## 2021-03-29 PROCEDURE — 3008F BODY MASS INDEX DOCD: CPT | Mod: CPTII,S$GLB,, | Performed by: UROLOGY

## 2021-03-29 PROCEDURE — 1101F PR PT FALLS ASSESS DOC 0-1 FALLS W/OUT INJ PAST YR: ICD-10-PCS | Mod: CPTII,S$GLB,, | Performed by: UROLOGY

## 2021-03-29 PROCEDURE — 99999 PR PBB SHADOW E&M-EST. PATIENT-LVL IV: CPT | Mod: PBBFAC,,, | Performed by: UROLOGY

## 2021-03-29 PROCEDURE — 74018 RADEX ABDOMEN 1 VIEW: CPT | Mod: TC,PO

## 2021-03-29 PROCEDURE — 1159F MED LIST DOCD IN RCRD: CPT | Mod: S$GLB,,, | Performed by: UROLOGY

## 2021-03-29 PROCEDURE — 99999 PR PBB SHADOW E&M-EST. PATIENT-LVL IV: ICD-10-PCS | Mod: PBBFAC,,, | Performed by: UROLOGY

## 2021-03-29 PROCEDURE — 1101F PT FALLS ASSESS-DOCD LE1/YR: CPT | Mod: CPTII,S$GLB,, | Performed by: UROLOGY

## 2021-03-29 PROCEDURE — 3288F PR FALLS RISK ASSESSMENT DOCUMENTED: ICD-10-PCS | Mod: CPTII,S$GLB,, | Performed by: UROLOGY

## 2021-03-29 PROCEDURE — 74018 RADEX ABDOMEN 1 VIEW: CPT | Mod: 26,,, | Performed by: RADIOLOGY

## 2021-03-29 PROCEDURE — 74018 XR ABDOMEN AP 1 VIEW: ICD-10-PCS | Mod: 26,,, | Performed by: RADIOLOGY

## 2021-03-29 PROCEDURE — 1159F PR MEDICATION LIST DOCUMENTED IN MEDICAL RECORD: ICD-10-PCS | Mod: S$GLB,,, | Performed by: UROLOGY

## 2021-03-29 PROCEDURE — 1126F PR PAIN SEVERITY QUANTIFIED, NO PAIN PRESENT: ICD-10-PCS | Mod: S$GLB,,, | Performed by: UROLOGY

## 2021-03-29 PROCEDURE — 1126F AMNT PAIN NOTED NONE PRSNT: CPT | Mod: S$GLB,,, | Performed by: UROLOGY

## 2021-03-29 PROCEDURE — 99215 OFFICE O/P EST HI 40 MIN: CPT | Mod: S$GLB,,, | Performed by: UROLOGY

## 2021-03-31 ENCOUNTER — OFFICE VISIT (OUTPATIENT)
Dept: HEMATOLOGY/ONCOLOGY | Facility: CLINIC | Age: 76
End: 2021-03-31
Attending: INTERNAL MEDICINE
Payer: MEDICARE

## 2021-03-31 VITALS
HEART RATE: 73 BPM | WEIGHT: 127.88 LBS | RESPIRATION RATE: 14 BRPM | BODY MASS INDEX: 22.66 KG/M2 | HEIGHT: 63 IN | OXYGEN SATURATION: 98 % | DIASTOLIC BLOOD PRESSURE: 73 MMHG | SYSTOLIC BLOOD PRESSURE: 132 MMHG | TEMPERATURE: 98 F

## 2021-03-31 DIAGNOSIS — D64.9 ANEMIA, UNSPECIFIED TYPE: ICD-10-CM

## 2021-03-31 DIAGNOSIS — D50.8 OTHER IRON DEFICIENCY ANEMIA: Primary | ICD-10-CM

## 2021-03-31 DIAGNOSIS — F10.21 ALCOHOLISM IN REMISSION: ICD-10-CM

## 2021-03-31 DIAGNOSIS — K21.9 GERD WITHOUT ESOPHAGITIS: ICD-10-CM

## 2021-03-31 DIAGNOSIS — N18.32 STAGE 3B CHRONIC KIDNEY DISEASE: ICD-10-CM

## 2021-03-31 PROBLEM — D50.9 IRON DEFICIENCY ANEMIA: Status: ACTIVE | Noted: 2021-03-31

## 2021-03-31 PROCEDURE — 1126F AMNT PAIN NOTED NONE PRSNT: CPT | Mod: S$GLB,,, | Performed by: STUDENT IN AN ORGANIZED HEALTH CARE EDUCATION/TRAINING PROGRAM

## 2021-03-31 PROCEDURE — 1101F PR PT FALLS ASSESS DOC 0-1 FALLS W/OUT INJ PAST YR: ICD-10-PCS | Mod: CPTII,S$GLB,, | Performed by: STUDENT IN AN ORGANIZED HEALTH CARE EDUCATION/TRAINING PROGRAM

## 2021-03-31 PROCEDURE — 3288F FALL RISK ASSESSMENT DOCD: CPT | Mod: CPTII,S$GLB,, | Performed by: STUDENT IN AN ORGANIZED HEALTH CARE EDUCATION/TRAINING PROGRAM

## 2021-03-31 PROCEDURE — 99499 RISK ADDL DX/OHS AUDIT: ICD-10-PCS | Mod: S$GLB,,, | Performed by: STUDENT IN AN ORGANIZED HEALTH CARE EDUCATION/TRAINING PROGRAM

## 2021-03-31 PROCEDURE — 99499 UNLISTED E&M SERVICE: CPT | Mod: S$GLB,,, | Performed by: STUDENT IN AN ORGANIZED HEALTH CARE EDUCATION/TRAINING PROGRAM

## 2021-03-31 PROCEDURE — 99205 PR OFFICE/OUTPT VISIT, NEW, LEVL V, 60-74 MIN: ICD-10-PCS | Mod: S$GLB,,, | Performed by: STUDENT IN AN ORGANIZED HEALTH CARE EDUCATION/TRAINING PROGRAM

## 2021-03-31 PROCEDURE — 99999 PR PBB SHADOW E&M-EST. PATIENT-LVL V: CPT | Mod: PBBFAC,,, | Performed by: STUDENT IN AN ORGANIZED HEALTH CARE EDUCATION/TRAINING PROGRAM

## 2021-03-31 PROCEDURE — 3288F PR FALLS RISK ASSESSMENT DOCUMENTED: ICD-10-PCS | Mod: CPTII,S$GLB,, | Performed by: STUDENT IN AN ORGANIZED HEALTH CARE EDUCATION/TRAINING PROGRAM

## 2021-03-31 PROCEDURE — 99999 PR PBB SHADOW E&M-EST. PATIENT-LVL V: ICD-10-PCS | Mod: PBBFAC,,, | Performed by: STUDENT IN AN ORGANIZED HEALTH CARE EDUCATION/TRAINING PROGRAM

## 2021-03-31 PROCEDURE — 3008F PR BODY MASS INDEX (BMI) DOCUMENTED: ICD-10-PCS | Mod: CPTII,S$GLB,, | Performed by: STUDENT IN AN ORGANIZED HEALTH CARE EDUCATION/TRAINING PROGRAM

## 2021-03-31 PROCEDURE — 3008F BODY MASS INDEX DOCD: CPT | Mod: CPTII,S$GLB,, | Performed by: STUDENT IN AN ORGANIZED HEALTH CARE EDUCATION/TRAINING PROGRAM

## 2021-03-31 PROCEDURE — 1101F PT FALLS ASSESS-DOCD LE1/YR: CPT | Mod: CPTII,S$GLB,, | Performed by: STUDENT IN AN ORGANIZED HEALTH CARE EDUCATION/TRAINING PROGRAM

## 2021-03-31 PROCEDURE — 1159F MED LIST DOCD IN RCRD: CPT | Mod: S$GLB,,, | Performed by: STUDENT IN AN ORGANIZED HEALTH CARE EDUCATION/TRAINING PROGRAM

## 2021-03-31 PROCEDURE — 1159F PR MEDICATION LIST DOCUMENTED IN MEDICAL RECORD: ICD-10-PCS | Mod: S$GLB,,, | Performed by: STUDENT IN AN ORGANIZED HEALTH CARE EDUCATION/TRAINING PROGRAM

## 2021-03-31 PROCEDURE — 1126F PR PAIN SEVERITY QUANTIFIED, NO PAIN PRESENT: ICD-10-PCS | Mod: S$GLB,,, | Performed by: STUDENT IN AN ORGANIZED HEALTH CARE EDUCATION/TRAINING PROGRAM

## 2021-03-31 PROCEDURE — 99205 OFFICE O/P NEW HI 60 MIN: CPT | Mod: S$GLB,,, | Performed by: STUDENT IN AN ORGANIZED HEALTH CARE EDUCATION/TRAINING PROGRAM

## 2021-03-31 RX ORDER — PANTOPRAZOLE SODIUM 20 MG/1
20 TABLET, DELAYED RELEASE ORAL DAILY
Qty: 90 TABLET | Refills: 0 | Status: SHIPPED | OUTPATIENT
Start: 2021-03-31 | End: 2021-06-22

## 2021-03-31 RX ORDER — FERROUS SULFATE 325(65) MG
325 TABLET ORAL
Qty: 30 TABLET | Refills: 3 | Status: SHIPPED | OUTPATIENT
Start: 2021-03-31 | End: 2021-07-22

## 2021-04-16 ENCOUNTER — PATIENT MESSAGE (OUTPATIENT)
Dept: INTERNAL MEDICINE | Facility: CLINIC | Age: 76
End: 2021-04-16

## 2021-04-16 ENCOUNTER — OFFICE VISIT (OUTPATIENT)
Dept: HEMATOLOGY/ONCOLOGY | Facility: CLINIC | Age: 76
End: 2021-04-16
Payer: MEDICARE

## 2021-04-16 DIAGNOSIS — E53.8 FOLIC ACID DEFICIENCY: ICD-10-CM

## 2021-04-16 DIAGNOSIS — N18.32 STAGE 3B CHRONIC KIDNEY DISEASE: ICD-10-CM

## 2021-04-16 DIAGNOSIS — D50.8 OTHER IRON DEFICIENCY ANEMIA: Primary | ICD-10-CM

## 2021-04-16 DIAGNOSIS — K21.9 GERD WITHOUT ESOPHAGITIS: ICD-10-CM

## 2021-04-16 DIAGNOSIS — F10.21 ALCOHOLISM IN REMISSION: ICD-10-CM

## 2021-04-16 PROCEDURE — 1159F MED LIST DOCD IN RCRD: CPT | Mod: 95,,, | Performed by: STUDENT IN AN ORGANIZED HEALTH CARE EDUCATION/TRAINING PROGRAM

## 2021-04-16 PROCEDURE — 99214 OFFICE O/P EST MOD 30 MIN: CPT | Mod: 95,,, | Performed by: STUDENT IN AN ORGANIZED HEALTH CARE EDUCATION/TRAINING PROGRAM

## 2021-04-16 PROCEDURE — 99499 RISK ADDL DX/OHS AUDIT: ICD-10-PCS | Mod: 95,,, | Performed by: STUDENT IN AN ORGANIZED HEALTH CARE EDUCATION/TRAINING PROGRAM

## 2021-04-16 PROCEDURE — 99214 PR OFFICE/OUTPT VISIT, EST, LEVL IV, 30-39 MIN: ICD-10-PCS | Mod: 95,,, | Performed by: STUDENT IN AN ORGANIZED HEALTH CARE EDUCATION/TRAINING PROGRAM

## 2021-04-16 PROCEDURE — 1159F PR MEDICATION LIST DOCUMENTED IN MEDICAL RECORD: ICD-10-PCS | Mod: 95,,, | Performed by: STUDENT IN AN ORGANIZED HEALTH CARE EDUCATION/TRAINING PROGRAM

## 2021-04-16 PROCEDURE — 99499 UNLISTED E&M SERVICE: CPT | Mod: 95,,, | Performed by: STUDENT IN AN ORGANIZED HEALTH CARE EDUCATION/TRAINING PROGRAM

## 2021-04-16 RX ORDER — FOLIC ACID 1 MG/1
1 TABLET ORAL DAILY
Qty: 100 TABLET | Refills: 3 | Status: SHIPPED | OUTPATIENT
Start: 2021-04-16 | End: 2021-12-08

## 2021-04-20 ENCOUNTER — TELEPHONE (OUTPATIENT)
Dept: HEMATOLOGY/ONCOLOGY | Facility: CLINIC | Age: 76
End: 2021-04-20

## 2021-04-20 ENCOUNTER — DOCUMENTATION ONLY (OUTPATIENT)
Dept: HEMATOLOGY/ONCOLOGY | Facility: CLINIC | Age: 76
End: 2021-04-20

## 2021-05-04 ENCOUNTER — TELEPHONE (OUTPATIENT)
Dept: NEPHROLOGY | Facility: CLINIC | Age: 76
End: 2021-05-04

## 2021-05-04 DIAGNOSIS — N18.30 STAGE 3 CHRONIC KIDNEY DISEASE, UNSPECIFIED WHETHER STAGE 3A OR 3B CKD: Primary | ICD-10-CM

## 2021-05-05 ENCOUNTER — OFFICE VISIT (OUTPATIENT)
Dept: OPTOMETRY | Facility: CLINIC | Age: 76
End: 2021-05-05
Payer: MEDICARE

## 2021-05-05 DIAGNOSIS — H25.13 NUCLEAR SCLEROSIS, BILATERAL: Primary | ICD-10-CM

## 2021-05-05 DIAGNOSIS — H52.13 MYOPIA WITH PRESBYOPIA, BILATERAL: Primary | ICD-10-CM

## 2021-05-05 DIAGNOSIS — H52.4 MYOPIA WITH PRESBYOPIA, BILATERAL: Primary | ICD-10-CM

## 2021-05-05 DIAGNOSIS — Z97.3 WEARS CONTACT LENSES: ICD-10-CM

## 2021-05-05 DIAGNOSIS — H52.13 MYOPIA WITH PRESBYOPIA, BILATERAL: ICD-10-CM

## 2021-05-05 DIAGNOSIS — H52.4 MYOPIA WITH PRESBYOPIA, BILATERAL: ICD-10-CM

## 2021-05-05 PROCEDURE — 1126F AMNT PAIN NOTED NONE PRSNT: CPT | Mod: S$GLB,,, | Performed by: OPTOMETRIST

## 2021-05-05 PROCEDURE — 92014 PR EYE EXAM, EST PATIENT,COMPREHESV: ICD-10-PCS | Mod: S$GLB,,, | Performed by: OPTOMETRIST

## 2021-05-05 PROCEDURE — 1101F PR PT FALLS ASSESS DOC 0-1 FALLS W/OUT INJ PAST YR: ICD-10-PCS | Mod: CPTII,S$GLB,, | Performed by: OPTOMETRIST

## 2021-05-05 PROCEDURE — 92310 PR CONTACT LENS FITTING (NO CHANGE): ICD-10-PCS | Mod: CSM,S$GLB,, | Performed by: OPTOMETRIST

## 2021-05-05 PROCEDURE — 92310 CONTACT LENS FITTING OU: CPT | Mod: CSM,S$GLB,, | Performed by: OPTOMETRIST

## 2021-05-05 PROCEDURE — 99499 NO LOS: ICD-10-PCS | Mod: S$GLB,,, | Performed by: OPTOMETRIST

## 2021-05-05 PROCEDURE — 3288F PR FALLS RISK ASSESSMENT DOCUMENTED: ICD-10-PCS | Mod: CPTII,S$GLB,, | Performed by: OPTOMETRIST

## 2021-05-05 PROCEDURE — 92014 COMPRE OPH EXAM EST PT 1/>: CPT | Mod: S$GLB,,, | Performed by: OPTOMETRIST

## 2021-05-05 PROCEDURE — 1126F PR PAIN SEVERITY QUANTIFIED, NO PAIN PRESENT: ICD-10-PCS | Mod: S$GLB,,, | Performed by: OPTOMETRIST

## 2021-05-05 PROCEDURE — 99999 PR PBB SHADOW E&M-EST. PATIENT-LVL III: ICD-10-PCS | Mod: PBBFAC,,, | Performed by: OPTOMETRIST

## 2021-05-05 PROCEDURE — 92015 DETERMINE REFRACTIVE STATE: CPT | Mod: S$GLB,,, | Performed by: OPTOMETRIST

## 2021-05-05 PROCEDURE — 99999 PR PBB SHADOW E&M-EST. PATIENT-LVL III: CPT | Mod: PBBFAC,,, | Performed by: OPTOMETRIST

## 2021-05-05 PROCEDURE — 92015 PR REFRACTION: ICD-10-PCS | Mod: S$GLB,,, | Performed by: OPTOMETRIST

## 2021-05-05 PROCEDURE — 1101F PT FALLS ASSESS-DOCD LE1/YR: CPT | Mod: CPTII,S$GLB,, | Performed by: OPTOMETRIST

## 2021-05-05 PROCEDURE — 3288F FALL RISK ASSESSMENT DOCD: CPT | Mod: CPTII,S$GLB,, | Performed by: OPTOMETRIST

## 2021-05-05 PROCEDURE — 99499 UNLISTED E&M SERVICE: CPT | Mod: S$GLB,,, | Performed by: OPTOMETRIST

## 2021-05-17 ENCOUNTER — LAB VISIT (OUTPATIENT)
Dept: LAB | Facility: HOSPITAL | Age: 76
End: 2021-05-17
Payer: MEDICARE

## 2021-05-17 ENCOUNTER — OFFICE VISIT (OUTPATIENT)
Dept: NEPHROLOGY | Facility: CLINIC | Age: 76
End: 2021-05-17
Payer: MEDICARE

## 2021-05-17 ENCOUNTER — PATIENT MESSAGE (OUTPATIENT)
Dept: NEPHROLOGY | Facility: CLINIC | Age: 76
End: 2021-05-17

## 2021-05-17 VITALS
DIASTOLIC BLOOD PRESSURE: 80 MMHG | HEIGHT: 63 IN | WEIGHT: 127.44 LBS | BODY MASS INDEX: 22.58 KG/M2 | OXYGEN SATURATION: 98 % | SYSTOLIC BLOOD PRESSURE: 120 MMHG | HEART RATE: 75 BPM

## 2021-05-17 DIAGNOSIS — D64.9 ANEMIA, UNSPECIFIED TYPE: ICD-10-CM

## 2021-05-17 DIAGNOSIS — N28.1 ACQUIRED BILATERAL RENAL CYSTS: ICD-10-CM

## 2021-05-17 DIAGNOSIS — M81.0 AGE-RELATED OSTEOPOROSIS WITHOUT CURRENT PATHOLOGICAL FRACTURE: ICD-10-CM

## 2021-05-17 DIAGNOSIS — N17.9 AKI (ACUTE KIDNEY INJURY): ICD-10-CM

## 2021-05-17 DIAGNOSIS — N20.0 NEPHROLITH: ICD-10-CM

## 2021-05-17 DIAGNOSIS — N18.30 STAGE 3 CHRONIC KIDNEY DISEASE, UNSPECIFIED WHETHER STAGE 3A OR 3B CKD: Primary | ICD-10-CM

## 2021-05-17 DIAGNOSIS — E55.9 VITAMIN D DEFICIENCY: ICD-10-CM

## 2021-05-17 LAB
ALBUMIN SERPL BCP-MCNC: 3.7 G/DL (ref 3.5–5.2)
ANION GAP SERPL CALC-SCNC: 11 MMOL/L (ref 8–16)
BASOPHILS # BLD AUTO: 0.08 K/UL (ref 0–0.2)
BASOPHILS NFR BLD: 1.2 % (ref 0–1.9)
BUN SERPL-MCNC: 50 MG/DL (ref 8–23)
CALCIUM SERPL-MCNC: 10.1 MG/DL (ref 8.7–10.5)
CHLORIDE SERPL-SCNC: 109 MMOL/L (ref 95–110)
CO2 SERPL-SCNC: 18 MMOL/L (ref 23–29)
CREAT SERPL-MCNC: 2.3 MG/DL (ref 0.5–1.4)
DIFFERENTIAL METHOD: ABNORMAL
EOSINOPHIL # BLD AUTO: 0.3 K/UL (ref 0–0.5)
EOSINOPHIL NFR BLD: 4.3 % (ref 0–8)
ERYTHROCYTE [DISTWIDTH] IN BLOOD BY AUTOMATED COUNT: 13 % (ref 11.5–14.5)
EST. GFR  (AFRICAN AMERICAN): 23.6 ML/MIN/1.73 M^2
EST. GFR  (NON AFRICAN AMERICAN): 20.5 ML/MIN/1.73 M^2
GLUCOSE SERPL-MCNC: 97 MG/DL (ref 70–110)
HCT VFR BLD AUTO: 35.1 % (ref 37–48.5)
HGB BLD-MCNC: 11.2 G/DL (ref 12–16)
IMM GRANULOCYTES # BLD AUTO: 0.06 K/UL (ref 0–0.04)
IMM GRANULOCYTES NFR BLD AUTO: 0.9 % (ref 0–0.5)
LYMPHOCYTES # BLD AUTO: 1 K/UL (ref 1–4.8)
LYMPHOCYTES NFR BLD: 15 % (ref 18–48)
MCH RBC QN AUTO: 29.2 PG (ref 27–31)
MCHC RBC AUTO-ENTMCNC: 31.9 G/DL (ref 32–36)
MCV RBC AUTO: 91 FL (ref 82–98)
MONOCYTES # BLD AUTO: 0.5 K/UL (ref 0.3–1)
MONOCYTES NFR BLD: 6.6 % (ref 4–15)
NEUTROPHILS # BLD AUTO: 5 K/UL (ref 1.8–7.7)
NEUTROPHILS NFR BLD: 72 % (ref 38–73)
NRBC BLD-RTO: 0 /100 WBC
PHOSPHATE SERPL-MCNC: 3.3 MG/DL (ref 2.7–4.5)
PLATELET # BLD AUTO: 219 K/UL (ref 150–450)
PMV BLD AUTO: 9.4 FL (ref 9.2–12.9)
POTASSIUM SERPL-SCNC: 4 MMOL/L (ref 3.5–5.1)
RBC # BLD AUTO: 3.84 M/UL (ref 4–5.4)
SODIUM SERPL-SCNC: 138 MMOL/L (ref 136–145)
WBC # BLD AUTO: 6.94 K/UL (ref 3.9–12.7)

## 2021-05-17 PROCEDURE — 1159F PR MEDICATION LIST DOCUMENTED IN MEDICAL RECORD: ICD-10-PCS | Mod: S$GLB,,, | Performed by: NURSE PRACTITIONER

## 2021-05-17 PROCEDURE — 80069 RENAL FUNCTION PANEL: CPT | Performed by: NURSE PRACTITIONER

## 2021-05-17 PROCEDURE — 99999 PR PBB SHADOW E&M-EST. PATIENT-LVL IV: CPT | Mod: PBBFAC,,, | Performed by: NURSE PRACTITIONER

## 2021-05-17 PROCEDURE — 99499 UNLISTED E&M SERVICE: CPT | Mod: S$GLB,,, | Performed by: NURSE PRACTITIONER

## 2021-05-17 PROCEDURE — 36415 COLL VENOUS BLD VENIPUNCTURE: CPT | Performed by: NURSE PRACTITIONER

## 2021-05-17 PROCEDURE — 1101F PR PT FALLS ASSESS DOC 0-1 FALLS W/OUT INJ PAST YR: ICD-10-PCS | Mod: CPTII,S$GLB,, | Performed by: NURSE PRACTITIONER

## 2021-05-17 PROCEDURE — 3288F PR FALLS RISK ASSESSMENT DOCUMENTED: ICD-10-PCS | Mod: CPTII,S$GLB,, | Performed by: NURSE PRACTITIONER

## 2021-05-17 PROCEDURE — 85025 COMPLETE CBC W/AUTO DIFF WBC: CPT | Performed by: NURSE PRACTITIONER

## 2021-05-17 PROCEDURE — 1126F PR PAIN SEVERITY QUANTIFIED, NO PAIN PRESENT: ICD-10-PCS | Mod: S$GLB,,, | Performed by: NURSE PRACTITIONER

## 2021-05-17 PROCEDURE — 3288F FALL RISK ASSESSMENT DOCD: CPT | Mod: CPTII,S$GLB,, | Performed by: NURSE PRACTITIONER

## 2021-05-17 PROCEDURE — 1126F AMNT PAIN NOTED NONE PRSNT: CPT | Mod: S$GLB,,, | Performed by: NURSE PRACTITIONER

## 2021-05-17 PROCEDURE — 99215 PR OFFICE/OUTPT VISIT, EST, LEVL V, 40-54 MIN: ICD-10-PCS | Mod: S$GLB,,, | Performed by: NURSE PRACTITIONER

## 2021-05-17 PROCEDURE — 1159F MED LIST DOCD IN RCRD: CPT | Mod: S$GLB,,, | Performed by: NURSE PRACTITIONER

## 2021-05-17 PROCEDURE — 99215 OFFICE O/P EST HI 40 MIN: CPT | Mod: S$GLB,,, | Performed by: NURSE PRACTITIONER

## 2021-05-17 PROCEDURE — 99499 RISK ADDL DX/OHS AUDIT: ICD-10-PCS | Mod: S$GLB,,, | Performed by: NURSE PRACTITIONER

## 2021-05-17 PROCEDURE — 1101F PT FALLS ASSESS-DOCD LE1/YR: CPT | Mod: CPTII,S$GLB,, | Performed by: NURSE PRACTITIONER

## 2021-05-17 PROCEDURE — 99999 PR PBB SHADOW E&M-EST. PATIENT-LVL IV: ICD-10-PCS | Mod: PBBFAC,,, | Performed by: NURSE PRACTITIONER

## 2021-05-17 PROCEDURE — 3008F BODY MASS INDEX DOCD: CPT | Mod: CPTII,S$GLB,, | Performed by: NURSE PRACTITIONER

## 2021-05-17 PROCEDURE — 3008F PR BODY MASS INDEX (BMI) DOCUMENTED: ICD-10-PCS | Mod: CPTII,S$GLB,, | Performed by: NURSE PRACTITIONER

## 2021-05-18 ENCOUNTER — HOSPITAL ENCOUNTER (OUTPATIENT)
Dept: RADIOLOGY | Facility: OTHER | Age: 76
Discharge: HOME | End: 2021-05-18
Attending: NURSE PRACTITIONER
Payer: MEDICARE

## 2021-05-18 DIAGNOSIS — N17.9 AKI (ACUTE KIDNEY INJURY): ICD-10-CM

## 2021-05-18 PROCEDURE — 76770 US EXAM ABDO BACK WALL COMP: CPT | Mod: 26,,, | Performed by: RADIOLOGY

## 2021-05-18 PROCEDURE — 76770 US EXAM ABDO BACK WALL COMP: CPT | Mod: TC

## 2021-05-18 PROCEDURE — 76770 US RETROPERITONEAL COMPLETE: ICD-10-PCS | Mod: 26,,, | Performed by: RADIOLOGY

## 2021-05-18 RX ORDER — ERGOCALCIFEROL 1.25 MG/1
CAPSULE ORAL
Qty: 24 CAPSULE | Refills: 0 | OUTPATIENT
Start: 2021-05-18

## 2021-05-19 ENCOUNTER — PATIENT MESSAGE (OUTPATIENT)
Dept: NEPHROLOGY | Facility: CLINIC | Age: 76
End: 2021-05-19

## 2021-05-21 ENCOUNTER — LAB VISIT (OUTPATIENT)
Dept: LAB | Facility: OTHER | Age: 76
End: 2021-05-21
Payer: MEDICARE

## 2021-05-21 ENCOUNTER — PATIENT MESSAGE (OUTPATIENT)
Dept: NEPHROLOGY | Facility: CLINIC | Age: 76
End: 2021-05-21

## 2021-05-21 DIAGNOSIS — N17.9 AKI (ACUTE KIDNEY INJURY): Primary | ICD-10-CM

## 2021-05-21 DIAGNOSIS — N18.30 STAGE 3 CHRONIC KIDNEY DISEASE, UNSPECIFIED WHETHER STAGE 3A OR 3B CKD: ICD-10-CM

## 2021-05-21 LAB
ANION GAP SERPL CALC-SCNC: 11 MMOL/L (ref 8–16)
BUN SERPL-MCNC: 47 MG/DL (ref 8–23)
CALCIUM SERPL-MCNC: 10.3 MG/DL (ref 8.7–10.5)
CHLORIDE SERPL-SCNC: 109 MMOL/L (ref 95–110)
CO2 SERPL-SCNC: 20 MMOL/L (ref 23–29)
CREAT SERPL-MCNC: 2 MG/DL (ref 0.5–1.4)
EST. GFR  (AFRICAN AMERICAN): 28 ML/MIN/1.73 M^2
EST. GFR  (NON AFRICAN AMERICAN): 24 ML/MIN/1.73 M^2
GLUCOSE SERPL-MCNC: 107 MG/DL (ref 70–110)
POTASSIUM SERPL-SCNC: 3.7 MMOL/L (ref 3.5–5.1)
SODIUM SERPL-SCNC: 140 MMOL/L (ref 136–145)

## 2021-05-21 PROCEDURE — 36415 COLL VENOUS BLD VENIPUNCTURE: CPT | Performed by: NURSE PRACTITIONER

## 2021-05-21 PROCEDURE — 80048 BASIC METABOLIC PNL TOTAL CA: CPT | Performed by: NURSE PRACTITIONER

## 2021-05-25 ENCOUNTER — PATIENT MESSAGE (OUTPATIENT)
Dept: NEPHROLOGY | Facility: CLINIC | Age: 76
End: 2021-05-25

## 2021-05-28 ENCOUNTER — LAB VISIT (OUTPATIENT)
Dept: LAB | Facility: OTHER | Age: 76
End: 2021-05-28
Payer: MEDICARE

## 2021-05-28 DIAGNOSIS — N17.9 AKI (ACUTE KIDNEY INJURY): ICD-10-CM

## 2021-05-28 LAB
ANION GAP SERPL CALC-SCNC: 8 MMOL/L (ref 8–16)
BUN SERPL-MCNC: 40 MG/DL (ref 8–23)
CALCIUM SERPL-MCNC: 9.6 MG/DL (ref 8.7–10.5)
CHLORIDE SERPL-SCNC: 109 MMOL/L (ref 95–110)
CO2 SERPL-SCNC: 22 MMOL/L (ref 23–29)
CREAT SERPL-MCNC: 1.9 MG/DL (ref 0.5–1.4)
EST. GFR  (AFRICAN AMERICAN): 30 ML/MIN/1.73 M^2
EST. GFR  (NON AFRICAN AMERICAN): 26 ML/MIN/1.73 M^2
GLUCOSE SERPL-MCNC: 102 MG/DL (ref 70–110)
POTASSIUM SERPL-SCNC: 4 MMOL/L (ref 3.5–5.1)
SODIUM SERPL-SCNC: 139 MMOL/L (ref 136–145)

## 2021-05-28 PROCEDURE — 36415 COLL VENOUS BLD VENIPUNCTURE: CPT | Performed by: NURSE PRACTITIONER

## 2021-05-28 PROCEDURE — 80048 BASIC METABOLIC PNL TOTAL CA: CPT | Performed by: NURSE PRACTITIONER

## 2021-06-01 DIAGNOSIS — N17.9 AKI (ACUTE KIDNEY INJURY): Primary | ICD-10-CM

## 2021-06-11 ENCOUNTER — LAB VISIT (OUTPATIENT)
Dept: LAB | Facility: OTHER | Age: 76
End: 2021-06-11
Attending: NURSE PRACTITIONER
Payer: MEDICARE

## 2021-06-11 DIAGNOSIS — N17.9 AKI (ACUTE KIDNEY INJURY): ICD-10-CM

## 2021-06-11 LAB
ANION GAP SERPL CALC-SCNC: 10 MMOL/L (ref 8–16)
BUN SERPL-MCNC: 36 MG/DL (ref 8–23)
CALCIUM SERPL-MCNC: 10.1 MG/DL (ref 8.7–10.5)
CHLORIDE SERPL-SCNC: 108 MMOL/L (ref 95–110)
CO2 SERPL-SCNC: 21 MMOL/L (ref 23–29)
CREAT SERPL-MCNC: 1.9 MG/DL (ref 0.5–1.4)
EST. GFR  (AFRICAN AMERICAN): 30 ML/MIN/1.73 M^2
EST. GFR  (NON AFRICAN AMERICAN): 26 ML/MIN/1.73 M^2
GLUCOSE SERPL-MCNC: 101 MG/DL (ref 70–110)
POTASSIUM SERPL-SCNC: 4.4 MMOL/L (ref 3.5–5.1)
SODIUM SERPL-SCNC: 139 MMOL/L (ref 136–145)

## 2021-06-11 PROCEDURE — 36415 COLL VENOUS BLD VENIPUNCTURE: CPT | Performed by: NURSE PRACTITIONER

## 2021-06-11 PROCEDURE — 80048 BASIC METABOLIC PNL TOTAL CA: CPT | Performed by: NURSE PRACTITIONER

## 2021-06-16 ENCOUNTER — PATIENT MESSAGE (OUTPATIENT)
Dept: INTERNAL MEDICINE | Facility: CLINIC | Age: 76
End: 2021-06-16

## 2021-06-16 DIAGNOSIS — N18.30 STAGE 3 CHRONIC KIDNEY DISEASE, UNSPECIFIED WHETHER STAGE 3A OR 3B CKD: Primary | ICD-10-CM

## 2021-08-13 ENCOUNTER — HOSPITAL ENCOUNTER (OUTPATIENT)
Dept: RADIOLOGY | Facility: OTHER | Age: 76
Discharge: HOME | End: 2021-08-13
Attending: INTERNAL MEDICINE
Payer: MEDICARE

## 2021-08-13 DIAGNOSIS — Q61.3: ICD-10-CM

## 2021-08-13 DIAGNOSIS — I15.1: ICD-10-CM

## 2021-08-13 PROCEDURE — 74150 CT ABDOMEN WITHOUT CONTRAST: ICD-10-PCS | Mod: 26,,, | Performed by: INTERNAL MEDICINE

## 2021-08-13 PROCEDURE — 74150 CT ABDOMEN W/O CONTRAST: CPT | Mod: TC

## 2021-08-13 PROCEDURE — 74150 CT ABDOMEN W/O CONTRAST: CPT | Mod: 26,,, | Performed by: INTERNAL MEDICINE

## 2021-08-20 ENCOUNTER — OFFICE VISIT (OUTPATIENT)
Dept: HEMATOLOGY/ONCOLOGY | Facility: CLINIC | Age: 76
End: 2021-08-20
Payer: MEDICARE

## 2021-08-20 DIAGNOSIS — F10.21 ALCOHOLISM IN REMISSION: ICD-10-CM

## 2021-08-20 DIAGNOSIS — D53.9 NUTRITIONAL ANEMIA, UNSPECIFIED: ICD-10-CM

## 2021-08-20 DIAGNOSIS — D50.8 OTHER IRON DEFICIENCY ANEMIA: Primary | ICD-10-CM

## 2021-08-20 DIAGNOSIS — E53.8 FOLIC ACID DEFICIENCY: ICD-10-CM

## 2021-08-20 DIAGNOSIS — N18.32 STAGE 3B CHRONIC KIDNEY DISEASE: ICD-10-CM

## 2021-08-20 PROCEDURE — 1159F MED LIST DOCD IN RCRD: CPT | Mod: CPTII,95,, | Performed by: STUDENT IN AN ORGANIZED HEALTH CARE EDUCATION/TRAINING PROGRAM

## 2021-08-20 PROCEDURE — 99213 PR OFFICE/OUTPT VISIT, EST, LEVL III, 20-29 MIN: ICD-10-PCS | Mod: 95,,, | Performed by: STUDENT IN AN ORGANIZED HEALTH CARE EDUCATION/TRAINING PROGRAM

## 2021-08-20 PROCEDURE — 99499 RISK ADDL DX/OHS AUDIT: ICD-10-PCS | Mod: 95,,, | Performed by: STUDENT IN AN ORGANIZED HEALTH CARE EDUCATION/TRAINING PROGRAM

## 2021-08-20 PROCEDURE — 1160F PR REVIEW ALL MEDS BY PRESCRIBER/CLIN PHARMACIST DOCUMENTED: ICD-10-PCS | Mod: CPTII,95,, | Performed by: STUDENT IN AN ORGANIZED HEALTH CARE EDUCATION/TRAINING PROGRAM

## 2021-08-20 PROCEDURE — 99213 OFFICE O/P EST LOW 20 MIN: CPT | Mod: 95,,, | Performed by: STUDENT IN AN ORGANIZED HEALTH CARE EDUCATION/TRAINING PROGRAM

## 2021-08-20 PROCEDURE — 1160F RVW MEDS BY RX/DR IN RCRD: CPT | Mod: CPTII,95,, | Performed by: STUDENT IN AN ORGANIZED HEALTH CARE EDUCATION/TRAINING PROGRAM

## 2021-08-20 PROCEDURE — 99499 UNLISTED E&M SERVICE: CPT | Mod: 95,,, | Performed by: STUDENT IN AN ORGANIZED HEALTH CARE EDUCATION/TRAINING PROGRAM

## 2021-08-20 PROCEDURE — 1159F PR MEDICATION LIST DOCUMENTED IN MEDICAL RECORD: ICD-10-PCS | Mod: CPTII,95,, | Performed by: STUDENT IN AN ORGANIZED HEALTH CARE EDUCATION/TRAINING PROGRAM

## 2021-08-24 ENCOUNTER — OFFICE VISIT (OUTPATIENT)
Dept: INTERNAL MEDICINE | Facility: CLINIC | Age: 76
End: 2021-08-24
Attending: INTERNAL MEDICINE
Payer: MEDICARE

## 2021-08-24 VITALS
WEIGHT: 125.69 LBS | HEIGHT: 63 IN | OXYGEN SATURATION: 94 % | SYSTOLIC BLOOD PRESSURE: 126 MMHG | BODY MASS INDEX: 22.27 KG/M2 | DIASTOLIC BLOOD PRESSURE: 80 MMHG | HEART RATE: 70 BPM

## 2021-08-24 DIAGNOSIS — N28.1 ACQUIRED BILATERAL RENAL CYSTS: ICD-10-CM

## 2021-08-24 DIAGNOSIS — N17.9 AKI (ACUTE KIDNEY INJURY): Primary | ICD-10-CM

## 2021-08-24 DIAGNOSIS — K21.9 GERD WITHOUT ESOPHAGITIS: ICD-10-CM

## 2021-08-24 DIAGNOSIS — D64.9 ANEMIA, UNSPECIFIED TYPE: ICD-10-CM

## 2021-08-24 DIAGNOSIS — E55.9 VITAMIN D DEFICIENCY: ICD-10-CM

## 2021-08-24 DIAGNOSIS — M81.0 AGE RELATED OSTEOPOROSIS, UNSPECIFIED PATHOLOGICAL FRACTURE PRESENCE: ICD-10-CM

## 2021-08-24 DIAGNOSIS — N18.32 STAGE 3B CHRONIC KIDNEY DISEASE: ICD-10-CM

## 2021-08-24 PROCEDURE — 99499 UNLISTED E&M SERVICE: CPT | Mod: S$GLB,,, | Performed by: INTERNAL MEDICINE

## 2021-08-24 PROCEDURE — 99999 PR PBB SHADOW E&M-EST. PATIENT-LVL III: ICD-10-PCS | Mod: PBBFAC,,, | Performed by: INTERNAL MEDICINE

## 2021-08-24 PROCEDURE — 99214 PR OFFICE/OUTPT VISIT, EST, LEVL IV, 30-39 MIN: ICD-10-PCS | Mod: S$GLB,,, | Performed by: INTERNAL MEDICINE

## 2021-08-24 PROCEDURE — 1159F PR MEDICATION LIST DOCUMENTED IN MEDICAL RECORD: ICD-10-PCS | Mod: CPTII,S$GLB,, | Performed by: INTERNAL MEDICINE

## 2021-08-24 PROCEDURE — 1101F PT FALLS ASSESS-DOCD LE1/YR: CPT | Mod: CPTII,S$GLB,, | Performed by: INTERNAL MEDICINE

## 2021-08-24 PROCEDURE — 3074F PR MOST RECENT SYSTOLIC BLOOD PRESSURE < 130 MM HG: ICD-10-PCS | Mod: CPTII,S$GLB,, | Performed by: INTERNAL MEDICINE

## 2021-08-24 PROCEDURE — 1160F PR REVIEW ALL MEDS BY PRESCRIBER/CLIN PHARMACIST DOCUMENTED: ICD-10-PCS | Mod: CPTII,S$GLB,, | Performed by: INTERNAL MEDICINE

## 2021-08-24 PROCEDURE — 3074F SYST BP LT 130 MM HG: CPT | Mod: CPTII,S$GLB,, | Performed by: INTERNAL MEDICINE

## 2021-08-24 PROCEDURE — 3079F DIAST BP 80-89 MM HG: CPT | Mod: CPTII,S$GLB,, | Performed by: INTERNAL MEDICINE

## 2021-08-24 PROCEDURE — 3288F PR FALLS RISK ASSESSMENT DOCUMENTED: ICD-10-PCS | Mod: CPTII,S$GLB,, | Performed by: INTERNAL MEDICINE

## 2021-08-24 PROCEDURE — 3008F BODY MASS INDEX DOCD: CPT | Mod: CPTII,S$GLB,, | Performed by: INTERNAL MEDICINE

## 2021-08-24 PROCEDURE — 99214 OFFICE O/P EST MOD 30 MIN: CPT | Mod: S$GLB,,, | Performed by: INTERNAL MEDICINE

## 2021-08-24 PROCEDURE — 1160F RVW MEDS BY RX/DR IN RCRD: CPT | Mod: CPTII,S$GLB,, | Performed by: INTERNAL MEDICINE

## 2021-08-24 PROCEDURE — 1126F PR PAIN SEVERITY QUANTIFIED, NO PAIN PRESENT: ICD-10-PCS | Mod: CPTII,S$GLB,, | Performed by: INTERNAL MEDICINE

## 2021-08-24 PROCEDURE — 3079F PR MOST RECENT DIASTOLIC BLOOD PRESSURE 80-89 MM HG: ICD-10-PCS | Mod: CPTII,S$GLB,, | Performed by: INTERNAL MEDICINE

## 2021-08-24 PROCEDURE — 3288F FALL RISK ASSESSMENT DOCD: CPT | Mod: CPTII,S$GLB,, | Performed by: INTERNAL MEDICINE

## 2021-08-24 PROCEDURE — 1101F PR PT FALLS ASSESS DOC 0-1 FALLS W/OUT INJ PAST YR: ICD-10-PCS | Mod: CPTII,S$GLB,, | Performed by: INTERNAL MEDICINE

## 2021-08-24 PROCEDURE — 99499 RISK ADDL DX/OHS AUDIT: ICD-10-PCS | Mod: S$GLB,,, | Performed by: INTERNAL MEDICINE

## 2021-08-24 PROCEDURE — 3008F PR BODY MASS INDEX (BMI) DOCUMENTED: ICD-10-PCS | Mod: CPTII,S$GLB,, | Performed by: INTERNAL MEDICINE

## 2021-08-24 PROCEDURE — 99999 PR PBB SHADOW E&M-EST. PATIENT-LVL III: CPT | Mod: PBBFAC,,, | Performed by: INTERNAL MEDICINE

## 2021-08-24 PROCEDURE — 1126F AMNT PAIN NOTED NONE PRSNT: CPT | Mod: CPTII,S$GLB,, | Performed by: INTERNAL MEDICINE

## 2021-08-24 PROCEDURE — 1159F MED LIST DOCD IN RCRD: CPT | Mod: CPTII,S$GLB,, | Performed by: INTERNAL MEDICINE

## 2021-09-23 ENCOUNTER — IMMUNIZATION (OUTPATIENT)
Dept: INTERNAL MEDICINE | Facility: CLINIC | Age: 76
End: 2021-09-23
Payer: MEDICARE

## 2021-09-23 DIAGNOSIS — Z23 NEED FOR VACCINATION: Primary | ICD-10-CM

## 2021-09-23 PROCEDURE — 91300 COVID-19, MRNA, LNP-S, PF, 30 MCG/0.3 ML DOSE VACCINE: CPT | Mod: PBBFAC | Performed by: INTERNAL MEDICINE

## 2021-09-23 PROCEDURE — 0003A COVID-19, MRNA, LNP-S, PF, 30 MCG/0.3 ML DOSE VACCINE: CPT | Mod: PBBFAC | Performed by: INTERNAL MEDICINE

## 2021-11-19 ENCOUNTER — PES CALL (OUTPATIENT)
Dept: ADMINISTRATIVE | Facility: CLINIC | Age: 76
End: 2021-11-19
Payer: MEDICARE

## 2021-12-08 ENCOUNTER — OFFICE VISIT (OUTPATIENT)
Dept: INTERNAL MEDICINE | Facility: CLINIC | Age: 76
End: 2021-12-08
Attending: INTERNAL MEDICINE
Payer: MEDICARE

## 2021-12-08 VITALS
BODY MASS INDEX: 22.07 KG/M2 | WEIGHT: 124.56 LBS | OXYGEN SATURATION: 97 % | HEART RATE: 67 BPM | SYSTOLIC BLOOD PRESSURE: 112 MMHG | DIASTOLIC BLOOD PRESSURE: 60 MMHG | HEIGHT: 63 IN

## 2021-12-08 DIAGNOSIS — Q61.3 PKD (POLYCYSTIC KIDNEY DISEASE): ICD-10-CM

## 2021-12-08 DIAGNOSIS — R82.90 ABNORMAL URINALYSIS: Primary | ICD-10-CM

## 2021-12-08 DIAGNOSIS — M81.0 AGE RELATED OSTEOPOROSIS, UNSPECIFIED PATHOLOGICAL FRACTURE PRESENCE: ICD-10-CM

## 2021-12-08 DIAGNOSIS — N18.32 STAGE 3B CHRONIC KIDNEY DISEASE: ICD-10-CM

## 2021-12-08 DIAGNOSIS — K21.9 GERD WITHOUT ESOPHAGITIS: ICD-10-CM

## 2021-12-08 PROBLEM — N28.1 ACQUIRED BILATERAL RENAL CYSTS: Status: RESOLVED | Noted: 2018-07-16 | Resolved: 2021-12-08

## 2021-12-08 PROCEDURE — 99214 OFFICE O/P EST MOD 30 MIN: CPT | Mod: S$GLB,,, | Performed by: INTERNAL MEDICINE

## 2021-12-08 PROCEDURE — 4010F PR ACE/ARB THEARPY RXD/TAKEN: ICD-10-PCS | Mod: CPTII,S$GLB,, | Performed by: INTERNAL MEDICINE

## 2021-12-08 PROCEDURE — 99499 RISK ADDL DX/OHS AUDIT: ICD-10-PCS | Mod: S$GLB,,, | Performed by: INTERNAL MEDICINE

## 2021-12-08 PROCEDURE — 4010F ACE/ARB THERAPY RXD/TAKEN: CPT | Mod: CPTII,S$GLB,, | Performed by: INTERNAL MEDICINE

## 2021-12-08 PROCEDURE — 3066F NEPHROPATHY DOC TX: CPT | Mod: CPTII,S$GLB,, | Performed by: INTERNAL MEDICINE

## 2021-12-08 PROCEDURE — 3066F PR DOCUMENTATION OF TREATMENT FOR NEPHROPATHY: ICD-10-PCS | Mod: CPTII,S$GLB,, | Performed by: INTERNAL MEDICINE

## 2021-12-08 PROCEDURE — 99999 PR PBB SHADOW E&M-EST. PATIENT-LVL III: CPT | Mod: PBBFAC,,, | Performed by: INTERNAL MEDICINE

## 2021-12-08 PROCEDURE — 99999 PR PBB SHADOW E&M-EST. PATIENT-LVL III: ICD-10-PCS | Mod: PBBFAC,,, | Performed by: INTERNAL MEDICINE

## 2021-12-08 PROCEDURE — 99214 PR OFFICE/OUTPT VISIT, EST, LEVL IV, 30-39 MIN: ICD-10-PCS | Mod: S$GLB,,, | Performed by: INTERNAL MEDICINE

## 2021-12-08 PROCEDURE — 99499 UNLISTED E&M SERVICE: CPT | Mod: S$GLB,,, | Performed by: INTERNAL MEDICINE

## 2021-12-08 RX ORDER — ACETAMINOPHEN 500 MG
1 TABLET ORAL DAILY
COMMUNITY

## 2021-12-08 RX ORDER — LOSARTAN POTASSIUM 25 MG/1
25 TABLET ORAL DAILY
COMMUNITY
End: 2023-03-23

## 2022-01-03 ENCOUNTER — PES CALL (OUTPATIENT)
Dept: ADMINISTRATIVE | Facility: CLINIC | Age: 77
End: 2022-01-03
Payer: MEDICARE

## 2022-03-28 ENCOUNTER — TELEPHONE (OUTPATIENT)
Dept: INTERNAL MEDICINE | Facility: CLINIC | Age: 77
End: 2022-03-28
Payer: MEDICARE

## 2022-03-28 DIAGNOSIS — Z12.31 ENCOUNTER FOR SCREENING MAMMOGRAM FOR BREAST CANCER: Primary | ICD-10-CM

## 2022-03-31 ENCOUNTER — IMMUNIZATION (OUTPATIENT)
Dept: INTERNAL MEDICINE | Facility: CLINIC | Age: 77
End: 2022-03-31
Payer: MEDICARE

## 2022-03-31 DIAGNOSIS — Z23 NEED FOR VACCINATION: Primary | ICD-10-CM

## 2022-03-31 PROCEDURE — 91305 COVID-19, MRNA, LNP-S, PF, 30 MCG/0.3 ML DOSE VACCINE (PFIZER): CPT | Mod: PBBFAC | Performed by: INTERNAL MEDICINE

## 2022-05-17 ENCOUNTER — HOSPITAL ENCOUNTER (OUTPATIENT)
Dept: RADIOLOGY | Facility: OTHER | Age: 77
Discharge: HOME | End: 2022-05-17
Attending: INTERNAL MEDICINE
Payer: MEDICARE

## 2022-05-17 DIAGNOSIS — Z12.31 ENCOUNTER FOR SCREENING MAMMOGRAM FOR BREAST CANCER: ICD-10-CM

## 2022-05-17 PROCEDURE — 77067 MAMMO DIGITAL SCREENING BILAT WITH TOMO: ICD-10-PCS | Mod: 26,,, | Performed by: RADIOLOGY

## 2022-05-17 PROCEDURE — 77063 BREAST TOMOSYNTHESIS BI: CPT | Mod: 26,,, | Performed by: RADIOLOGY

## 2022-05-17 PROCEDURE — 77063 MAMMO DIGITAL SCREENING BILAT WITH TOMO: ICD-10-PCS | Mod: 26,,, | Performed by: RADIOLOGY

## 2022-05-17 PROCEDURE — 77067 SCR MAMMO BI INCL CAD: CPT | Mod: 26,,, | Performed by: RADIOLOGY

## 2022-05-17 PROCEDURE — 77063 BREAST TOMOSYNTHESIS BI: CPT | Mod: TC

## 2022-06-22 ENCOUNTER — OFFICE VISIT (OUTPATIENT)
Dept: OPTOMETRY | Facility: CLINIC | Age: 77
End: 2022-06-22
Payer: MEDICARE

## 2022-06-22 DIAGNOSIS — H52.13 MYOPIA WITH PRESBYOPIA, BILATERAL: ICD-10-CM

## 2022-06-22 DIAGNOSIS — Z97.3 WEARS CONTACT LENSES: ICD-10-CM

## 2022-06-22 DIAGNOSIS — H25.13 NUCLEAR SCLEROSIS, BILATERAL: Primary | ICD-10-CM

## 2022-06-22 DIAGNOSIS — H52.4 MYOPIA WITH PRESBYOPIA, BILATERAL: ICD-10-CM

## 2022-06-22 PROCEDURE — 1101F PR PT FALLS ASSESS DOC 0-1 FALLS W/OUT INJ PAST YR: ICD-10-PCS | Mod: CPTII,S$GLB,, | Performed by: OPTOMETRIST

## 2022-06-22 PROCEDURE — 3288F FALL RISK ASSESSMENT DOCD: CPT | Mod: CPTII,S$GLB,, | Performed by: OPTOMETRIST

## 2022-06-22 PROCEDURE — 99999 PR PBB SHADOW E&M-EST. PATIENT-LVL II: CPT | Mod: PBBFAC,,, | Performed by: OPTOMETRIST

## 2022-06-22 PROCEDURE — 1159F PR MEDICATION LIST DOCUMENTED IN MEDICAL RECORD: ICD-10-PCS | Mod: CPTII,S$GLB,, | Performed by: OPTOMETRIST

## 2022-06-22 PROCEDURE — 3288F PR FALLS RISK ASSESSMENT DOCUMENTED: ICD-10-PCS | Mod: CPTII,S$GLB,, | Performed by: OPTOMETRIST

## 2022-06-22 PROCEDURE — 4010F PR ACE/ARB THEARPY RXD/TAKEN: ICD-10-PCS | Mod: CPTII,S$GLB,, | Performed by: OPTOMETRIST

## 2022-06-22 PROCEDURE — 4010F ACE/ARB THERAPY RXD/TAKEN: CPT | Mod: CPTII,S$GLB,, | Performed by: OPTOMETRIST

## 2022-06-22 PROCEDURE — 92014 PR EYE EXAM, EST PATIENT,COMPREHESV: ICD-10-PCS | Mod: S$GLB,,, | Performed by: OPTOMETRIST

## 2022-06-22 PROCEDURE — 1126F AMNT PAIN NOTED NONE PRSNT: CPT | Mod: CPTII,S$GLB,, | Performed by: OPTOMETRIST

## 2022-06-22 PROCEDURE — 1101F PT FALLS ASSESS-DOCD LE1/YR: CPT | Mod: CPTII,S$GLB,, | Performed by: OPTOMETRIST

## 2022-06-22 PROCEDURE — 1159F MED LIST DOCD IN RCRD: CPT | Mod: CPTII,S$GLB,, | Performed by: OPTOMETRIST

## 2022-06-22 PROCEDURE — 92014 COMPRE OPH EXAM EST PT 1/>: CPT | Mod: S$GLB,,, | Performed by: OPTOMETRIST

## 2022-06-22 PROCEDURE — 1126F PR PAIN SEVERITY QUANTIFIED, NO PAIN PRESENT: ICD-10-PCS | Mod: CPTII,S$GLB,, | Performed by: OPTOMETRIST

## 2022-06-22 PROCEDURE — 99999 PR PBB SHADOW E&M-EST. PATIENT-LVL II: ICD-10-PCS | Mod: PBBFAC,,, | Performed by: OPTOMETRIST

## 2022-06-22 RX ORDER — TOLVAPTAN 45 MG-15MG
KIT ORAL
COMMUNITY
Start: 2022-06-17

## 2022-06-22 NOTE — PROGRESS NOTES
HPI     Patient here today for routine eye exam. C/o worsening VA ou at both   distance and near. Patient wears cl OD for distance. No pain or f/f.    Last edited by Maki Brewster on 6/22/2022  3:34 PM. (History)            Assessment /Plan     For exam results, see Encounter Report.    Nuclear sclerosis, bilateral    Myopia with presbyopia, bilateral    Wears contact lenses            1.  Educated on cataracts and affects on vision.  Patient unhappy with vision.  Consult Dr. Schmid for cataract surgery  2-3.  Continue with current contact lenses--educated pt will dispensed trials if needed before cataract surgery.  Retina flat and intact OU--no holes, tears, breaks, or RDs.  Eye health normal OU.

## 2022-09-02 ENCOUNTER — OFFICE VISIT (OUTPATIENT)
Dept: OPHTHALMOLOGY | Facility: CLINIC | Age: 77
End: 2022-09-02
Attending: OPHTHALMOLOGY
Payer: MEDICARE

## 2022-09-02 DIAGNOSIS — H25.13 NUCLEAR SCLEROSIS, BILATERAL: ICD-10-CM

## 2022-09-02 DIAGNOSIS — Z98.890 S/P LASIK (LASER ASSISTED IN SITU KERATOMILEUSIS): Primary | ICD-10-CM

## 2022-09-02 PROCEDURE — 1101F PR PT FALLS ASSESS DOC 0-1 FALLS W/OUT INJ PAST YR: ICD-10-PCS | Mod: CPTII,S$GLB,, | Performed by: OPHTHALMOLOGY

## 2022-09-02 PROCEDURE — 92004 COMPRE OPH EXAM NEW PT 1/>: CPT | Mod: S$GLB,,, | Performed by: OPHTHALMOLOGY

## 2022-09-02 PROCEDURE — 92004 PR EYE EXAM, NEW PATIENT,COMPREHESV: ICD-10-PCS | Mod: S$GLB,,, | Performed by: OPHTHALMOLOGY

## 2022-09-02 PROCEDURE — 99999 PR PBB SHADOW E&M-EST. PATIENT-LVL III: ICD-10-PCS | Mod: PBBFAC,,, | Performed by: OPHTHALMOLOGY

## 2022-09-02 PROCEDURE — 1126F AMNT PAIN NOTED NONE PRSNT: CPT | Mod: CPTII,S$GLB,, | Performed by: OPHTHALMOLOGY

## 2022-09-02 PROCEDURE — 1159F MED LIST DOCD IN RCRD: CPT | Mod: CPTII,S$GLB,, | Performed by: OPHTHALMOLOGY

## 2022-09-02 PROCEDURE — 1126F PR PAIN SEVERITY QUANTIFIED, NO PAIN PRESENT: ICD-10-PCS | Mod: CPTII,S$GLB,, | Performed by: OPHTHALMOLOGY

## 2022-09-02 PROCEDURE — 1101F PT FALLS ASSESS-DOCD LE1/YR: CPT | Mod: CPTII,S$GLB,, | Performed by: OPHTHALMOLOGY

## 2022-09-02 PROCEDURE — 1160F RVW MEDS BY RX/DR IN RCRD: CPT | Mod: CPTII,S$GLB,, | Performed by: OPHTHALMOLOGY

## 2022-09-02 PROCEDURE — 3288F FALL RISK ASSESSMENT DOCD: CPT | Mod: CPTII,S$GLB,, | Performed by: OPHTHALMOLOGY

## 2022-09-02 PROCEDURE — 99999 PR PBB SHADOW E&M-EST. PATIENT-LVL III: CPT | Mod: PBBFAC,,, | Performed by: OPHTHALMOLOGY

## 2022-09-02 PROCEDURE — 4010F ACE/ARB THERAPY RXD/TAKEN: CPT | Mod: CPTII,S$GLB,, | Performed by: OPHTHALMOLOGY

## 2022-09-02 PROCEDURE — 1160F PR REVIEW ALL MEDS BY PRESCRIBER/CLIN PHARMACIST DOCUMENTED: ICD-10-PCS | Mod: CPTII,S$GLB,, | Performed by: OPHTHALMOLOGY

## 2022-09-02 PROCEDURE — 1159F PR MEDICATION LIST DOCUMENTED IN MEDICAL RECORD: ICD-10-PCS | Mod: CPTII,S$GLB,, | Performed by: OPHTHALMOLOGY

## 2022-09-02 PROCEDURE — 4010F PR ACE/ARB THEARPY RXD/TAKEN: ICD-10-PCS | Mod: CPTII,S$GLB,, | Performed by: OPHTHALMOLOGY

## 2022-09-02 PROCEDURE — 3288F PR FALLS RISK ASSESSMENT DOCUMENTED: ICD-10-PCS | Mod: CPTII,S$GLB,, | Performed by: OPHTHALMOLOGY

## 2022-09-02 NOTE — PROGRESS NOTES
HPI    Referred by Dr. Staley    H/o LASIK OU /monovision/ OD distance/ OS Near /at least 10 years ago    Patient states she is here for a cataract evaluation. She states her   vision is very dark at night. Reading is much harder lately. She has to   bring reading material by a window or increase the lighting in the room in   order to see. (+)floaters but denies flashes.     No gtts.   Last edited by Jackie Coelho on 9/2/2022  9:59 AM.            Assessment /Plan     For exam results, see Encounter Report.    S/P LASIK (laser assisted in situ keratomileusis)    Nuclear sclerosis, bilateral      Incipient cataract: Patient does reports mild visual decline, but not sufficient to affect activities of daily living. I recommend monitoring visual status and follow up when visual symptoms worsen.  Post LASIK astigmatism

## 2022-09-14 ENCOUNTER — IMMUNIZATION (OUTPATIENT)
Dept: INTERNAL MEDICINE | Facility: CLINIC | Age: 77
End: 2022-09-14
Payer: MEDICARE

## 2022-09-14 DIAGNOSIS — Z23 NEED FOR VACCINATION: Primary | ICD-10-CM

## 2022-09-14 PROCEDURE — 91312 COVID-19, MRNA, LNP-S, BIVALENT BOOSTER, PF, 30 MCG/0.3 ML DOSE: CPT | Mod: S$GLB,,, | Performed by: INTERNAL MEDICINE

## 2022-09-14 PROCEDURE — 91312 COVID-19, MRNA, LNP-S, BIVALENT BOOSTER, PF, 30 MCG/0.3 ML DOSE: ICD-10-PCS | Mod: S$GLB,,, | Performed by: INTERNAL MEDICINE

## 2022-12-06 ENCOUNTER — TELEPHONE (OUTPATIENT)
Dept: ORTHOPEDICS | Facility: CLINIC | Age: 77
End: 2022-12-06
Payer: MEDICARE

## 2022-12-06 ENCOUNTER — HOSPITAL ENCOUNTER (OUTPATIENT)
Dept: RADIOLOGY | Facility: HOSPITAL | Age: 77
Discharge: HOME | End: 2022-12-06
Attending: ORTHOPAEDIC SURGERY
Payer: MEDICARE

## 2022-12-06 ENCOUNTER — OFFICE VISIT (OUTPATIENT)
Dept: ORTHOPEDICS | Facility: CLINIC | Age: 77
End: 2022-12-06
Payer: MEDICARE

## 2022-12-06 VITALS — HEIGHT: 63 IN | WEIGHT: 123.13 LBS | BODY MASS INDEX: 21.82 KG/M2

## 2022-12-06 DIAGNOSIS — M47.816 LUMBAR SPONDYLOSIS: ICD-10-CM

## 2022-12-06 DIAGNOSIS — M41.50 DEGENERATIVE SCOLIOSIS: Primary | ICD-10-CM

## 2022-12-06 DIAGNOSIS — M51.36 DDD (DEGENERATIVE DISC DISEASE), LUMBAR: ICD-10-CM

## 2022-12-06 DIAGNOSIS — M51.36 DDD (DEGENERATIVE DISC DISEASE), LUMBAR: Primary | ICD-10-CM

## 2022-12-06 PROCEDURE — 1160F RVW MEDS BY RX/DR IN RCRD: CPT | Mod: CPTII,S$GLB,, | Performed by: ORTHOPAEDIC SURGERY

## 2022-12-06 PROCEDURE — 1159F MED LIST DOCD IN RCRD: CPT | Mod: CPTII,S$GLB,, | Performed by: ORTHOPAEDIC SURGERY

## 2022-12-06 PROCEDURE — 1125F PR PAIN SEVERITY QUANTIFIED, PAIN PRESENT: ICD-10-PCS | Mod: CPTII,S$GLB,, | Performed by: ORTHOPAEDIC SURGERY

## 2022-12-06 PROCEDURE — 99204 PR OFFICE/OUTPT VISIT, NEW, LEVL IV, 45-59 MIN: ICD-10-PCS | Mod: GC,S$GLB,, | Performed by: ORTHOPAEDIC SURGERY

## 2022-12-06 PROCEDURE — 99204 OFFICE O/P NEW MOD 45 MIN: CPT | Mod: GC,S$GLB,, | Performed by: ORTHOPAEDIC SURGERY

## 2022-12-06 PROCEDURE — 72110 X-RAY EXAM L-2 SPINE 4/>VWS: CPT | Mod: 26,,, | Performed by: RADIOLOGY

## 2022-12-06 PROCEDURE — 72110 X-RAY EXAM L-2 SPINE 4/>VWS: CPT | Mod: TC

## 2022-12-06 PROCEDURE — 99999 PR PBB SHADOW E&M-EST. PATIENT-LVL III: CPT | Mod: PBBFAC,,, | Performed by: ORTHOPAEDIC SURGERY

## 2022-12-06 PROCEDURE — 4010F PR ACE/ARB THEARPY RXD/TAKEN: ICD-10-PCS | Mod: CPTII,S$GLB,, | Performed by: ORTHOPAEDIC SURGERY

## 2022-12-06 PROCEDURE — 72110 XR LUMBAR SPINE AP AND LAT WITH FLEX/EXT: ICD-10-PCS | Mod: 26,,, | Performed by: RADIOLOGY

## 2022-12-06 PROCEDURE — 3288F FALL RISK ASSESSMENT DOCD: CPT | Mod: CPTII,S$GLB,, | Performed by: ORTHOPAEDIC SURGERY

## 2022-12-06 PROCEDURE — 1159F PR MEDICATION LIST DOCUMENTED IN MEDICAL RECORD: ICD-10-PCS | Mod: CPTII,S$GLB,, | Performed by: ORTHOPAEDIC SURGERY

## 2022-12-06 PROCEDURE — 99999 PR PBB SHADOW E&M-EST. PATIENT-LVL III: ICD-10-PCS | Mod: PBBFAC,,, | Performed by: ORTHOPAEDIC SURGERY

## 2022-12-06 PROCEDURE — 1101F PR PT FALLS ASSESS DOC 0-1 FALLS W/OUT INJ PAST YR: ICD-10-PCS | Mod: CPTII,S$GLB,, | Performed by: ORTHOPAEDIC SURGERY

## 2022-12-06 PROCEDURE — 1160F PR REVIEW ALL MEDS BY PRESCRIBER/CLIN PHARMACIST DOCUMENTED: ICD-10-PCS | Mod: CPTII,S$GLB,, | Performed by: ORTHOPAEDIC SURGERY

## 2022-12-06 PROCEDURE — 3288F PR FALLS RISK ASSESSMENT DOCUMENTED: ICD-10-PCS | Mod: CPTII,S$GLB,, | Performed by: ORTHOPAEDIC SURGERY

## 2022-12-06 PROCEDURE — 1101F PT FALLS ASSESS-DOCD LE1/YR: CPT | Mod: CPTII,S$GLB,, | Performed by: ORTHOPAEDIC SURGERY

## 2022-12-06 PROCEDURE — 1125F AMNT PAIN NOTED PAIN PRSNT: CPT | Mod: CPTII,S$GLB,, | Performed by: ORTHOPAEDIC SURGERY

## 2022-12-06 PROCEDURE — 4010F ACE/ARB THERAPY RXD/TAKEN: CPT | Mod: CPTII,S$GLB,, | Performed by: ORTHOPAEDIC SURGERY

## 2022-12-06 RX ORDER — METHOCARBAMOL 500 MG/1
500 TABLET, FILM COATED ORAL 3 TIMES DAILY
Qty: 60 TABLET | Refills: 1 | Status: SHIPPED | OUTPATIENT
Start: 2022-12-06 | End: 2023-03-26

## 2022-12-06 NOTE — TELEPHONE ENCOUNTER
----- Message from Amandeep Love sent at 12/6/2022  8:51 AM CST -----  Regarding: Call Back  Contact: [phone]  Pt is calling stating that she will be late to her appointment. Please call to discuss further @ [phone]

## 2022-12-06 NOTE — TELEPHONE ENCOUNTER
Left message for patient informing about xray prior to appointment time.  Also to callback and inform us of what part of the back is hurting for order to get put in.

## 2022-12-06 NOTE — PROGRESS NOTES
DATE: 2022  PATIENT: Nelsy Mckenna    Attending Physician: Harmeet Reyes M.D.    CHIEF COMPLAINT: low back pain    HISTORY:  Nelsy Mckenna is a 77 y.o. female with a PMH of PCKD, Diverticulosis, and osteopenia presents for initial evaluation of low back and right groin pain (Back - 9, Leg - 1). The pain has been present for a few weeks. The patient describes the pain as sharp but it does not go down legs. She has R groin pain.  The pain is worse with movement and improved by nothing. There is no associated numbness and tingling. There is no subjective weakness. Prior treatments have included NSAIDs, but no PT, UBALDO, Surgery.    The Patient denies myelopathic symptoms such as handwriting changes or difficulty with buttons/coins/keys. Denies perineal paresthesias, bowel/bladder dysfunction.    The patient does not smoke, have DM or endorse IVDU. The patient is not on any blood thinners and does not take chronic narcotics. She is a retired .    PAST MEDICAL/SURGICAL HISTORY:  Past Medical History:   Diagnosis Date    Cataract     Diverticulosis     Nephrolithiasis     Osteopenia     PCK (polycystic kidney disease)     Vitamin D deficiency      Past Surgical History:   Procedure Laterality Date     SECTION      COLONOSCOPY N/A 2019    Procedure: COLONOSCOPY;  Surgeon: Keven Mckoy MD;  Location: [address];  Service: Endoscopy;  Laterality: N/A;    COSMETIC SURGERY      EYE SURGERY      FACIAL COSMETIC SURGERY      NASAL SEPTUM SURGERY      REFRACTIVE SURGERY Bilateral 1998    TUBAL LIGATION         Current Medications:   Current Outpatient Medications:     cholecalciferol, vitamin D3, (VITAMIN D3) 50 mcg (2,000 unit) Cap capsule, Take 1 capsule by mouth once daily., Disp: , Rfl:     JYNARQUE 45 mg (AM)/ 15 mg (PM) TbSQ, Take by mouth., Disp: , Rfl:     losartan (COZAAR) 25 MG tablet, Take 25 mg by mouth once daily., Disp: , Rfl:     methocarbamoL (ROBAXIN) 500 MG Tab, Take 1 tablet (500 mg total)  "by mouth 3 (three) times daily., Disp: 60 tablet, Rfl: 1    Social History:   Social History     Socioeconomic History    Marital status:    Occupational History    Occupation: Retired laywer   Tobacco Use    Smoking status: Former     Packs/day: 0.25     Years: 40.00     Pack years: 10.00     Types: Cigarettes     Start date: 11/28/1967     Quit date: 11/28/2007     Years since quitting: 15.0    Smokeless tobacco: Never   Substance and Sexual Activity    Alcohol use: Yes     Alcohol/week: 7.0 standard drinks     Types: 7 Glasses of wine per week    Drug use: No    Sexual activity: Yes     Partners: Male     Comment:  to Jesica   Social History Narrative    .  Plays golf frequently 2 days per week.  4 kids.    Exercising - 2 days per week with group           Social Determinants of Health     Physical Activity: Sufficiently Active    Days of Exercise per Week: 3 days    Minutes of Exercise per Session: 50 min   Stress: No Stress Concern Present    Feeling of Stress : Only a little       REVIEW OF SYSTEMS:  Constitution: Negative. Negative for chills, fever and night sweats.   Cardiovascular: Negative for chest pain and syncope.   Respiratory: Negative for cough and shortness of breath.   Gastrointestinal: See HPI. Negative for nausea/vomiting. Negative for abdominal pain.  Genitourinary: See HPI. Negative for discoloration or dysuria.  Hematologic/Lymphatic: no for bleeding/clotting disorders.   Musculoskeletal: Negative for falls and muscle weakness.   Neurological: See HPI. no history of seizures. no history of cranial surgery or shunts.  Neurological: See HPI. No seizures.   Endocrine: Negative for polydipsia, polyphagia and polyuria.   Allergic/Immunologic: Negative for hives and persistent infections.     EXAM:  Ht 5' 3" (1.6 m)   Wt 55.8 kg (123 lb 2 oz)   BMI 21.81 kg/m²     PHYSICAL EXAMINATION:    General: The patient is a WDWN 77 y.o. female in no apparent distress, the patient is " orientatied to person, place and time.  Psych: Normal mood and affect  HEENT: Vision grossly intact, hearing intact to the spoken word.  Lungs: Respirations unlabored.  Gait: Normal station and gait, no difficulty with toe or heel walk.   Skin: Dorsal lumbar skin negative for rashes, lesions, hairy patches and surgical scars. There is right lumbar tenderness to palpation.  Range of motion: Lumbar range of motion is acceptable.  Spinal Balance: Global saggital and coronal spinal balance acceptable, no significant for scoliosis and kyphosis.  Musculoskeletal: No pain with the range of motion of the bilateral hips. No trochanteric tenderness to palpation.  Vascular: Bilateral lower extremities warm and well perfused, Dorsalis pedis pulses 2+ bilaterally.  Neurological: Normal strength and tone in all major motor groups in the bilateral lower extremities. Normal sensation to light touch in the L2-S1 dermatomes bilaterally.  Deep tendon reflexes symmetric 2+ in the bilateral lower extremities.  Negative Babinski bilaterally. Straight leg raise negative bilaterally.    IMAGING:   Today I independently reviewed the following images and my interpretations are as follows:    AP, Lat and Flex/Ex  upright L-spine demonstrate lumbar spondylosis and DDD. Degenerative scoliosis.    DEXA from 2021 showed lumbar T-score of -1.5.    Body mass index is 21.81 kg/m².  Hemoglobin A1C   Date Value Ref Range Status   10/31/2012 5.3 4.0 - 6.2 % Final   07/05/2011 5.3 4.0 - 6.2 % Final   06/21/2010 5.3 4.0 - 6.2 % Final       ASSESSMENT/PLAN:    Nelsy was seen today for pain.    Diagnoses and all orders for this visit:    Degenerative scoliosis  -     Ambulatory referral/consult to Physical/Occupational Therapy; Future  -     methocarbamoL (ROBAXIN) 500 MG Tab; Take 1 tablet (500 mg total) by mouth 3 (three) times daily.    Lumbar spondylosis    DDD (degenerative disc disease), lumbar    Follow up if symptoms worsen or fail to  improve.    Patient has degenerative scoliosis and lumbar spondylosis. I discussed the natural history of their diagnoses as well as surgical and nonsurgical treatment options. I educated the patient on the importance of core/back strengthening, correct posture, bending/lifting ergonomics, and low-impact aerobic exercises (walking, elliptical, and aquatherapy). I prescribed robaxin. Continue medications tylenol. I will refer the patient to PT for core/back strengthening. Patient will follow up PRN. Next step is a lumbar MRI.    I have personally examined the patient and agree with the above plan.    Harmeet Reyes MD  Orthopaedic Spine Surgeon  Department of Orthopaedic Surgery  [phone]

## 2022-12-06 NOTE — TELEPHONE ENCOUNTER
----- Message from Ivonne Morales sent at 12/6/2022  8:40 AM CST -----  Regarding: Appt/Xray  Contact: pt @ [phone]  Message is for Avila Jessica missed your call and needed to talk with you about her back. Asking for a call back

## 2022-12-07 ENCOUNTER — CLINICAL SUPPORT (OUTPATIENT)
Dept: REHABILITATION | Facility: OTHER | Age: 77
End: 2022-12-07
Payer: MEDICARE

## 2022-12-07 DIAGNOSIS — M62.81 MUSCLE WEAKNESS OF LOWER EXTREMITY: ICD-10-CM

## 2022-12-07 DIAGNOSIS — M41.50 DEGENERATIVE SCOLIOSIS: ICD-10-CM

## 2022-12-07 DIAGNOSIS — M54.41 ACUTE RIGHT-SIDED LOW BACK PAIN WITH RIGHT-SIDED SCIATICA: ICD-10-CM

## 2022-12-07 DIAGNOSIS — R29.3 POSTURAL IMBALANCE: ICD-10-CM

## 2022-12-07 PROCEDURE — 97162 PT EVAL MOD COMPLEX 30 MIN: CPT | Mod: PN

## 2022-12-07 NOTE — PATIENT INSTRUCTIONS
SI joint Muscle energy for L Posterior/R anterior rotation              Bring both knees up towards your chest as shown in the picture.  Place your Left hand on top of your Left thigh, and your Right hand on the back of your Right thigh.  Push your legs into each hand with about 50% effort.      Perform 3 reps and hold for 3 seconds. Perform twice a day.    Copyright © 2022 [employer]

## 2022-12-08 NOTE — PLAN OF CARE
"[facility]  Physical Therapy Initial Evaluation    Name: Nelsy Mckenna  Clinic Number: [id number]    Therapy Diagnosis:   Encounter Diagnoses   Name Primary?    Degenerative scoliosis     Acute right-sided low back pain with right-sided sciatica     Muscle weakness of lower extremity     Postural imbalance      Physician: Harmeet Reyes MD    Physician Orders: PT Eval and Treat   Medical Diagnosis: M41.50 (ICD-10-CM) - Degenerative scoliosis  Evaluation Date: 2022  Authorization Period Expiration: 2023  Plan of Care Certification Period: 2023  Visit # / Visits authorized:     Time In: 12:05 pm  Time Out: 12:38 pm  Total Billable Time separate from evaluation: 5 minutes    Precautions: Standard      Subjective   Date of onset: Last Wednesday  History of current condition - Nelsy reports: low back pain and pain into R groin. States she has had back pain. Last Wednesday she was in yoga class and was "unusually sore." Also sore after class on Monday. States she didn't do anything particular. In certain positions she is ok, but can get shooting pain if she moves a certain way.        Past Medical History:   Diagnosis Date    Cataract     Diverticulosis     Nephrolithiasis     Osteopenia     PCK (polycystic kidney disease)     Vitamin D deficiency      Nelsy Mckenna  has a past surgical history that includes  section; Nasal septum surgery; Facial cosmetic surgery; Tubal ligation; Refractive surgery (Bilateral, ); Colonoscopy (N/A, 2019); Eye surgery; and Cosmetic surgery.    Nelsy has a current medication list which includes the following prescription(s): cholecalciferol (vitamin d3), jynarque, losartan, and methocarbamol.    Review of patient's allergies indicates:  No Known Allergies     Imaging, x-ray: 2022    FINDINGS:  Thoracolumbar scoliosis is present.  Bones are demineralized but intact disc spaces are maintained no collapse or destruction can be seen moderate " "degenerative changes are present    Prior Therapy: none  Social History:  lives with their spouse  Occupation: retired   Prior Level of Function: I with amb and ADL  Current Level of Function: I with amb and ADL    Pain:  Current 0/10, worst 9/10, best 0/10   Location: right back   Description: Sharp and Shooting  Aggravating Factors: turning in bed, getting out of a chair, leaning over, and loading the .  Easing Factors: adjust positions, muscle relaxer    Radicular symptoms: pain into R LE, "very seldom" numbness into R LE  Bowel and Bladder incontinence: none    Sleep is disturbed. Sleeping position: side, back, stomach    Patients structured exercise routine:  yoga 2 days,  1 day  Exercise routine prior to onset: yoga 2 days,  1 day      Pts goals: "To get rid of the pain." Avoid surgery    Objective       Postural examination in standing:  - normal lumbar lordosis  - forward head  - forward shoulders  - protracted scapulae    Postural examination in sitting:   - decreased lumbar lordosis  - forward head  - forward shoulders      Functional assessment:   - walking: I   - sit to stand: mod I with use of hands on thighs  - sit to supine:  mod I due to increased time       - supine to sit: mod I due to increased time  - supine to prone: mod I due to increased time    Lumbar active range of motion in standing is:  Flexion 70 deg   Extension 45 deg   Left side bending 25 deg   Right side bending 25 deg   Left rotation Decreased 25% with pulling on R   Right rotation Decreased 25%       Flexibility testing:  - hamstrings:           R: tight, R:-35 deg, L: WNL      - gastrocnemius:     B: WNL        - piriformis:              B: tight,  25%     - quadriceps:          B WNL      - hip flexors:           B: WNL  - hip adductors:      B: WNL  - IT Bands:             R: tight, decreased 50%     MMT R L   Hip flexion 3+/5 5/5   Hip abduction 3+/5 5/5   Hip " extension 4/5* 5/5   Hip ER 5/5 5/5   Hip IR 5/5 5/5   Knee extension 5/5 5/5   Knee flexion 5/5 5/5   Ankle dorsiflexion 5/5 5/5   Ankle plantar flexion 5/5 5/5   Ankle inversion 5/5 5/5   Ankle eversion 5/5 5/5       Endurance is fair.      Lumbar Special tests    SLR negative   Cross SLR negative   AYANA R hip tightness   Prone Instability Test positive     Palpation: tender in R QL     Sensation:  - Light Touch: intact  - Saddle: intact    Reflexes:   - Knee Jerk: 2+ bilaterally    CMS Impairment/Limitation/Restriction for FOTO Lumbar Spine Survey    Therapist reviewed FOTO scores for Nelsy Mckenna on 12/7/2022.   FOTO documents entered into [employer] - see Media section.    Limitation Score: 51%  Category: Mobility    Current : CK = at least 40% but < 60% impaired, limited or restricted  Goal: CJ = at least 20% but < 40% impaired, limited or restricted         TREATMENT   Treatment Time In: 12:33 pm  Treatment Time Out: 12:38 pm  Total Treatment time separate from Evaluation time:  5 minutes    Nelsy received therapeutic exercises to develop strength, endurance, ROM, flexibility, posture, and core stabilization for 5 minutes including:  Push/pull 3 x 3' with instruction for HEP      Home Exercises Provided and Patient Education Provided     Education provided:   - Discussed the role of the PTA on the Rehab Team. Discussed patient will be seen by a physical therapist minimally every 6th visit or every 30 days prior to being seen by PTA. Also discussed the use of the [facility] for communication.     Push/pull    Written Home Exercises Provided: yes.  Exercises were reviewed and Nelsy was able to demonstrate them prior to the end of the session.  Nelsy demonstrated good  understanding of the education provided.     See EMR under Patient Instructions for exercises provided 12/7/2022.    Assessment   Nelsy is a 77 y.o. female referred to outpatient Physical Therapy with a medical diagnosis of M41.50 (ICD-10-CM) -  Degenerative scoliosis. Pt presents with increased R low back pain that radiates into R LE, decreased R LE strength and flexibility, and pelvic instability. Patient will benefit from OP PT for core strengthening, pelvic stabilization, B LE strengthening and stretching, and low back strengthening and stretching. Arrived to session in pelvic malalignment. Push/pull promoted proper posture and better recruitment of gluteus medius. R hamstring muscle guarding noted today.    Pt prognosis is Good.   Pt will benefit from skilled outpatient Physical Therapy to address the deficits stated above and in the chart below, provide pt/family education, and to maximize pt's level of independence.     Plan of care discussed with patient: Yes  Pt's spiritual, cultural and educational needs considered and patient is agreeable to the plan of care and goals as stated below:     Anticipated Barriers for therapy: none    Medical Necessity is demonstrated by the following  History  Co-morbidities and personal factors that may impact the plan of care Co-morbidities:   osteopenia    Personal Factors:   no deficits     moderate   Examination  Body Structures and Functions, activity limitations and participation restrictions that may impact the plan of care Body Regions:   back  lower extremities    Body Systems:    gross symmetry  ROM  strength  transfers    Participation Restrictions:   none    Activity limitations:   Learning and applying knowledge  no deficits    General Tasks and Commands  no deficits    Communication  no deficits    Mobility  lifting and carrying objects    Self care  no deficits    Domestic Life  no deficits    Interactions/Relationships  no deficits    Life Areas  no deficits    Community and Social Life  no deficits         moderate   Clinical Presentation evolving clinical presentation with changing clinical characteristics moderate   Decision Making/ Complexity Score: moderate     Goals:  Short Term Goals: 4 weeks    Independent with HEP.  Report decreased lumbar and R LE pain < or =  6/10 with adls such as turning in bed, getting out of a chair, leaning over, and loading the .  Increased MMT for B LE by 1/2 muscle grade to promote proper pelvic stability to decrease  lumbar and R LE pain < or =  6/10 with adls such as turning in bed, getting out of a chair, leaning over, and loading the .     Long Term Goals: 8 weeks   Report decreased lumbar and R LE pain < or =  3/10 with adls such as turning in bed, getting out of a chair, leaning over, and loading the .  Increased MMT for B LE by 1 muscle grade to promote proper pelvic stability to decrease lumbar and R LE pain < or =  3/10 with adls such as turning in bed, getting out of a chair, leaning over, and loading the .   Increased flexibility in R hamstrings to -20 deg with 90/90 test to promote proper pelvic stability to decrease lumbar and R LE pain < or =  3/10 with adls such as turning in bed, getting out of a chair, leaning over, and loading the .  Increased flexibility in B piriformis and R IT bands to promote proper pelvic stability to decrease lumbar and R LE pain < or =  3/10 with adls such as turning in bed, getting out of a chair, leaning over, and loading the .  Patient to achieve CJ (at least 20% < 40% impaired, limited or restricted) level on the FOTO Outcomes Measurement System.    Plan   Certification Period/Plan of care expiration: 12/7/2022 to 2/1/2023.    Outpatient Physical Therapy 2 times weekly for 8 weeks to include the following interventions: Manual Therapy, Moist Heat/ Ice, Neuromuscular Re-ed, Patient Education, Therapeutic Activities, and Therapeutic Exercise.     Amilcar Robbins, PT       ,DirectAddress_Unknown

## 2022-12-12 ENCOUNTER — PES CALL (OUTPATIENT)
Dept: ADMINISTRATIVE | Facility: CLINIC | Age: 77
End: 2022-12-12
Payer: MEDICARE

## 2022-12-12 ENCOUNTER — CLINICAL SUPPORT (OUTPATIENT)
Dept: REHABILITATION | Facility: OTHER | Age: 77
End: 2022-12-12
Payer: MEDICARE

## 2022-12-12 DIAGNOSIS — M54.41 ACUTE RIGHT-SIDED LOW BACK PAIN WITH RIGHT-SIDED SCIATICA: Primary | ICD-10-CM

## 2022-12-12 DIAGNOSIS — M62.81 MUSCLE WEAKNESS OF LOWER EXTREMITY: ICD-10-CM

## 2022-12-12 DIAGNOSIS — R29.3 POSTURAL IMBALANCE: ICD-10-CM

## 2022-12-12 PROCEDURE — 97110 THERAPEUTIC EXERCISES: CPT | Mod: PN

## 2022-12-12 NOTE — PROGRESS NOTES
"[facility]   Physical Therapy Treatment Note     Name: Nelsy Mckenna  Clinic Number: [id number]    Therapy Diagnosis:   Encounter Diagnoses   Name Primary?    Acute right-sided low back pain with right-sided sciatica Yes    Muscle weakness of lower extremity     Postural imbalance      Physician: Harmeet Reyes MD    Visit Date: 12/12/2022  Physician Orders: PT Eval and Treat   Medical Diagnosis: M41.50 (ICD-10-CM) - Degenerative scoliosis  Evaluation Date: 12/7/2022  Authorization Period Expiration: additional pending  Plan of Care Certification Period: 2/1/2023  Visit # / Visits authorized: 2 (additional pending)  FOTO: 2/ 5   PTA Visit #: 0/5     Time In: 10:05 am  Time Out: 10:45 am  Total Billable Time: 40 minutes    Precautions: Standard    Subjective   Pt reports: right sided low back pain this morning.  She was compliant with home exercise program.  Response to previous treatment: no adverse effects  Functional change: no change reported today    Pain: 7/10  Location: back  right    OBJECTIVE     Objective Measures updated at progress report unless specified.     Treatment     Nelsy received therapeutic exercises to develop strength, ROM, flexibility, posture, and core stabilization for 40 minutes including:  TA bracing 20 x 5"  PPT 20 x 5"  Bridging 2 x 10 reps  Hook lying 3 way clams with green band x 20 reps each  Piriformis stretches 3 x 30" (IR and ER techniques)  Side lying hip abd 2 x 10 reps  Hip hinges with dowel x 20 reps  Dead lifts with dowel x 20 reps      Nelsy participated in neuromuscular re-education activities to improve: Balance, Proprioception, and Posture for 00 minutes. The following activities were included:    Nelsy received hot pack for 00 minutes to low back.    Nelsy received cold pack for 00 minutes to low back.      Home Exercises Provided and Patient Education Provided     Education provided:   - no new HEP added today    Written Home Exercises Provided: Patient " instructed to cont prior HEP.  Exercises were reviewed and Nelsy was able to demonstrate them prior to the end of the session.  Nelsy demonstrated good  understanding of the education provided.     See EMR under Patient Instructions for exercises provided prior visit.    Assessment     Continued with core activation and strengthening exercises today. PT provide verbal, visual, and tactile cues for TA bracing and posterior pelvic tilts.     Nelsy Is progressing well towards her goals.   Pt prognosis is Good.     Pt will continue to benefit from skilled outpatient physical therapy to address the deficits listed in the problem list box on initial evaluation, provide pt/family education and to maximize pt's level of independence in the home and community environment.     Pt's spiritual, cultural and educational needs considered and pt agreeable to plan of care and goals.    Anticipated barriers to physical therapy: none    Goals:   Short Term Goals: 4 weeks   Independent with HEP. (In progress)  Report decreased lumbar and R LE pain < or =  6/10 with adls such as turning in bed, getting out of a chair, leaning over, and loading the . (In progress)  Increased MMT for B LE by 1/2 muscle grade to promote proper pelvic stability to decrease  lumbar and R LE pain < or =  6/10 with adls such as turning in bed, getting out of a chair, leaning over, and loading the .  (In progress)     Long Term Goals: 8 weeks   Report decreased lumbar and R LE pain < or =  3/10 with adls such as turning in bed, getting out of a chair, leaning over, and loading the . (In progress)  Increased MMT for B LE by 1 muscle grade to promote proper pelvic stability to decrease lumbar and R LE pain < or =  3/10 with adls such as turning in bed, getting out of a chair, leaning over, and loading the .  (In progress)  Increased flexibility in R hamstrings to -20 deg with 90/90 test to promote proper pelvic stability to  decrease lumbar and R LE pain < or =  3/10 with adls such as turning in bed, getting out of a chair, leaning over, and loading the . (In progress)  Increased flexibility in B piriformis and R IT bands to promote proper pelvic stability to decrease lumbar and R LE pain < or =  3/10 with adls such as turning in bed, getting out of a chair, leaning over, and loading the . (In progress)  Patient to achieve CJ (at least 20% < 40% impaired, limited or restricted) level on the FOTO Outcomes Measurement System. (In progress)    Plan     Continue with core strengthening, LE stretching and strengthening. Will add weight for dead lifts next visit.    Amilcar Robbins, PT

## 2022-12-14 ENCOUNTER — CLINICAL SUPPORT (OUTPATIENT)
Dept: REHABILITATION | Facility: OTHER | Age: 77
End: 2022-12-14
Payer: MEDICARE

## 2022-12-14 DIAGNOSIS — R29.3 POSTURAL IMBALANCE: ICD-10-CM

## 2022-12-14 DIAGNOSIS — M62.81 MUSCLE WEAKNESS OF LOWER EXTREMITY: ICD-10-CM

## 2022-12-14 DIAGNOSIS — M54.41 ACUTE RIGHT-SIDED LOW BACK PAIN WITH RIGHT-SIDED SCIATICA: Primary | ICD-10-CM

## 2022-12-14 PROCEDURE — 97110 THERAPEUTIC EXERCISES: CPT | Mod: PN

## 2022-12-14 NOTE — PROGRESS NOTES
"[facility]   Physical Therapy Treatment Note     Name: Nelsy Mckenna  Clinic Number: [id number]    Therapy Diagnosis:   Encounter Diagnoses   Name Primary?    Acute right-sided low back pain with right-sided sciatica Yes    Muscle weakness of lower extremity     Postural imbalance        Physician: Harmeet Reyes MD    Visit Date: 12/14/2022  Physician Orders: PT Eval and Treat   Medical Diagnosis: M41.50 (ICD-10-CM) - Degenerative scoliosis  Evaluation Date: 12/7/2022  Authorization Period Expiration: 1?4/2023  Plan of Care Certification Period: 2/1/2023  Visit # / Visits authorized: 3 (2/11)  FOTO: 3/ 5   PTA Visit #: 0/5     Time In: 1:00 pm  Time Out: 2:00 pm  Total Billable Time:  minutes    Precautions: Standard    Subjective   Pt reports: her back feels better.   She was compliant with home exercise program.  Response to previous treatment: a little soreness  Functional change: no change reported today    Pain: 6/10  Location: back  right    OBJECTIVE     Objective Measures updated at progress report unless specified.     Treatment     Nelsy received therapeutic exercises to develop strength, ROM, flexibility, posture, and core stabilization for 45 minutes including:  LTR x 3'  TA bracing 20 x 5"  PPT 20 x 5"  Bridging 2 x 10 reps  Hook lying 3 way clams with green band x 20 reps each  Piriformis stretches 3 x 30" (IR and ER techniques)  Side lying hip abd 2 x 10 reps  Hip hinges with dowel x 20 reps  Dead lifts with 4# from 6" step 2 x 10 reps  Lateral walking 2 x 40 ft with pink band  Hesitation walk 2 x 40 ft      Nelsy participated in neuromuscular re-education activities to improve: Balance, Proprioception, and Posture for 00 minutes. The following activities were included:    Nelsy received hot pack for 00 minutes to low back.    Nelsy received cold pack for 00 minutes to low back.      Home Exercises Provided and Patient Education Provided     Education provided:   - no new HEP added " today    Written Home Exercises Provided: Patient instructed to cont prior HEP.  Exercises were reviewed and Nelsy was able to demonstrate them prior to the end of the session.  Nelsy demonstrated good  understanding of the education provided.     See EMR under Patient Instructions for exercises provided prior visit.    Assessment     Progressed to dead lifts with weight.today. Noted ome low back soreness.    Nelsy Is progressing well towards her goals.   Pt prognosis is Good.     Pt will continue to benefit from skilled outpatient physical therapy to address the deficits listed in the problem list box on initial evaluation, provide pt/family education and to maximize pt's level of independence in the home and community environment.     Pt's spiritual, cultural and educational needs considered and pt agreeable to plan of care and goals.    Anticipated barriers to physical therapy: none    Goals:   Short Term Goals: 4 weeks   Independent with HEP. (In progress)  Report decreased lumbar and R LE pain < or =  6/10 with adls such as turning in bed, getting out of a chair, leaning over, and loading the . (In progress)  Increased MMT for B LE by 1/2 muscle grade to promote proper pelvic stability to decrease  lumbar and R LE pain < or =  6/10 with adls such as turning in bed, getting out of a chair, leaning over, and loading the .  (In progress)     Long Term Goals: 8 weeks   Report decreased lumbar and R LE pain < or =  3/10 with adls such as turning in bed, getting out of a chair, leaning over, and loading the . (In progress)  Increased MMT for B LE by 1 muscle grade to promote proper pelvic stability to decrease lumbar and R LE pain < or =  3/10 with adls such as turning in bed, getting out of a chair, leaning over, and loading the .  (In progress)  Increased flexibility in R hamstrings to -20 deg with 90/90 test to promote proper pelvic stability to decrease lumbar and R LE pain < or  =  3/10 with adls such as turning in bed, getting out of a chair, leaning over, and loading the . (In progress)  Increased flexibility in B piriformis and R IT bands to promote proper pelvic stability to decrease lumbar and R LE pain < or =  3/10 with adls such as turning in bed, getting out of a chair, leaning over, and loading the . (In progress)  Patient to achieve CJ (at least 20% < 40% impaired, limited or restricted) level on the FOTO Outcomes Measurement System. (In progress)    Plan     Continue with core strengthening, LE stretching and strengthening. Will add weight for dead lifts next visit.    Amilcar Robbins, PT

## 2022-12-20 ENCOUNTER — CLINICAL SUPPORT (OUTPATIENT)
Dept: REHABILITATION | Facility: OTHER | Age: 77
End: 2022-12-20
Payer: MEDICARE

## 2022-12-20 ENCOUNTER — DOCUMENTATION ONLY (OUTPATIENT)
Dept: REHABILITATION | Facility: OTHER | Age: 77
End: 2022-12-20

## 2022-12-20 DIAGNOSIS — R29.3 POSTURAL IMBALANCE: ICD-10-CM

## 2022-12-20 DIAGNOSIS — M54.41 ACUTE RIGHT-SIDED LOW BACK PAIN WITH RIGHT-SIDED SCIATICA: Primary | ICD-10-CM

## 2022-12-20 DIAGNOSIS — M62.81 MUSCLE WEAKNESS OF LOWER EXTREMITY: ICD-10-CM

## 2022-12-20 PROCEDURE — 97110 THERAPEUTIC EXERCISES: CPT | Mod: PN,CQ

## 2022-12-20 NOTE — PROGRESS NOTES
"[facility]   Physical Therapy Treatment Note     Name: Nelsy Mckenna  Clinic Number: [id number]    Therapy Diagnosis:   Encounter Diagnoses   Name Primary?    Acute right-sided low back pain with right-sided sciatica Yes    Muscle weakness of lower extremity     Postural imbalance        Physician: Harmeet Reyes MD    Visit Date: 12/20/2022  Physician Orders: PT Eval and Treat   Medical Diagnosis: M41.50 (ICD-10-CM) - Degenerative scoliosis  Evaluation Date: 12/7/2022  Authorization Period Expiration: 1?4/2023  Plan of Care Certification Period: 2/1/2023  Visit # / Visits authorized: 4 (3/11)  FOTO: 4/ 5   PTA Visit #: 1/5     Time In: 12:12 pm  Time Out: 12:55 pm  Total Billable Time: 43 minutes    Precautions: Standard    Subjective   Pt reports: Feeling great. Went to yoga yesterday and was able to do pretty much everything    She was compliant with home exercise program.  Response to previous treatment: Sore, took a day or two before it felt better  Functional change: no change reported today    Pain: 2/10  Location: R low back    OBJECTIVE     Objective Measures updated at progress report unless specified.     Treatment     Nelsy received therapeutic exercises to develop strength, ROM, flexibility, posture, and core stabilization for 43 minutes including:  LTR x 3'  TA bracing 20 x 5"  +Supine TA marches 2 x 10  PPT 20 x 5"  Bridging 2 x 10 reps  Hook lying 3 way clams with green band x 20 reps each  Piriformis stretches 3 x 30" (IR and ER techniques)  Side lying hip abd 2 x 10 reps  Hip hinges with dowel x 20 reps  Dead lifts with 4# from 6" step 2 x 10 reps  Lateral walking 2 x 40 ft with pink band  Hesitation walk 2 x 40 ft      Nelsy participated in neuromuscular re-education activities to improve: Balance, Proprioception, and Posture for 00 minutes. The following activities were included:    Nelsy received hot pack for 00 minutes to low back.    Nelsy received cold pack for 00 minutes to " low back.      Home Exercises Provided and Patient Education Provided     Education provided:   - Exercise form and rationale    Written Home Exercises Provided: Patient instructed to cont prior HEP.  Exercises were reviewed and Nelsy was able to demonstrate them prior to the end of the session.  Nelsy demonstrated good  understanding of the education provided.     See EMR under Patient Instructions for exercises provided prior visit.    Assessment   Pt tolerated treatment well with no increase in symptoms. Pt unable to descend low enough to reach step with weight during dead lifts due to quad weakness    Nelsy Is progressing well towards her goals.   Pt prognosis is Good.     Pt will continue to benefit from skilled outpatient physical therapy to address the deficits listed in the problem list box on initial evaluation, provide pt/family education and to maximize pt's level of independence in the home and community environment.     Pt's spiritual, cultural and educational needs considered and pt agreeable to plan of care and goals.    Anticipated barriers to physical therapy: none    Goals:   Short Term Goals: 4 weeks   Independent with HEP. (In progress)  Report decreased lumbar and R LE pain < or =  6/10 with adls such as turning in bed, getting out of a chair, leaning over, and loading the . (In progress)  Increased MMT for B LE by 1/2 muscle grade to promote proper pelvic stability to decrease  lumbar and R LE pain < or =  6/10 with adls such as turning in bed, getting out of a chair, leaning over, and loading the .  (In progress)     Long Term Goals: 8 weeks   Report decreased lumbar and R LE pain < or =  3/10 with adls such as turning in bed, getting out of a chair, leaning over, and loading the . (In progress)  Increased MMT for B LE by 1 muscle grade to promote proper pelvic stability to decrease lumbar and R LE pain < or =  3/10 with adls such as turning in bed, getting out of a  chair, leaning over, and loading the .  (In progress)  Increased flexibility in R hamstrings to -20 deg with 90/90 test to promote proper pelvic stability to decrease lumbar and R LE pain < or =  3/10 with adls such as turning in bed, getting out of a chair, leaning over, and loading the . (In progress)  Increased flexibility in B piriformis and R IT bands to promote proper pelvic stability to decrease lumbar and R LE pain < or =  3/10 with adls such as turning in bed, getting out of a chair, leaning over, and loading the . (In progress)  Patient to achieve CJ (at least 20% < 40% impaired, limited or restricted) level on the FOTO Outcomes Measurement System. (In progress)    Plan     Continue with core strengthening, LE stretching and strengthening. Will add weight for dead lifts next visit.    Kayleen Don III, PTA

## 2022-12-21 NOTE — PROGRESS NOTES
PT/PTA met face to face to discuss pt's treatment plan and progress towards established goals. Pt will be seen by a physical therapist minimally every 6th visit or every 30 days.    Kayleen Don III, PTA     Meeting as noted above    Amilcar Robbins, PT

## 2022-12-28 ENCOUNTER — CLINICAL SUPPORT (OUTPATIENT)
Dept: REHABILITATION | Facility: OTHER | Age: 77
End: 2022-12-28
Payer: MEDICARE

## 2022-12-28 DIAGNOSIS — R29.3 POSTURAL IMBALANCE: ICD-10-CM

## 2022-12-28 DIAGNOSIS — M62.81 MUSCLE WEAKNESS OF LOWER EXTREMITY: ICD-10-CM

## 2022-12-28 DIAGNOSIS — M54.41 ACUTE RIGHT-SIDED LOW BACK PAIN WITH RIGHT-SIDED SCIATICA: Primary | ICD-10-CM

## 2022-12-28 PROCEDURE — 97110 THERAPEUTIC EXERCISES: CPT | Mod: PN,CQ

## 2023-01-04 ENCOUNTER — CLINICAL SUPPORT (OUTPATIENT)
Dept: REHABILITATION | Facility: OTHER | Age: 78
End: 2023-01-04
Payer: MEDICARE

## 2023-01-04 DIAGNOSIS — M54.41 ACUTE RIGHT-SIDED LOW BACK PAIN WITH RIGHT-SIDED SCIATICA: Primary | ICD-10-CM

## 2023-01-04 DIAGNOSIS — R29.3 POSTURAL IMBALANCE: ICD-10-CM

## 2023-01-04 DIAGNOSIS — M62.81 MUSCLE WEAKNESS OF LOWER EXTREMITY: ICD-10-CM

## 2023-01-04 PROCEDURE — 97110 THERAPEUTIC EXERCISES: CPT | Mod: PN

## 2023-01-04 NOTE — PROGRESS NOTES
"[facility]   Physical Therapy Treatment Note     Name: Nelsy Mckenna  Clinic Number: [id number]    Therapy Diagnosis:   Encounter Diagnoses   Name Primary?    Acute right-sided low back pain with right-sided sciatica Yes    Muscle weakness of lower extremity     Postural imbalance          Physician: Harmeet Reyes MD    Visit Date: 1/4/2023  Physician Orders: PT Eval and Treat   Medical Diagnosis: M41.50 (ICD-10-CM) - Degenerative scoliosis  Evaluation Date: 12/7/2022  Authorization Period Expiration: 1/4/2023  Plan of Care Certification Period: 2/1/2023  Visit # / Visits authorized: 6 (5/11)  FOTO: 2/ 5  (12/28/2022)  PTA Visit #: 0/5     Time In: 12:00 pm  Time Out: 12:45 pm  Total Billable Time: 45 minutes    Precautions: Standard    Subjective   Pt reports: with certain movements she can get intense, shooting pain. Getting out of the chair, rolling over in the bed can cause the increased pain. Denied pain at the start of the session. States she wet to yoga on Monday, but was hurting on Tuesday. States she does not get pain the days following PT.     She was compliant with home exercise program.  Response to previous treatment: "It was good"  Functional change: still having pain with transitional movements.    Pain: 0/10  Location: R low back    OBJECTIVE     Objective Measures updated at progress report unless specified.     Treatment     Nelsy received therapeutic exercises to develop strength, ROM, flexibility, posture, and core stabilization for 45 minutes including:  LTR x 3'  Supine TA marches 2 x 10  PPT 20 x 5"  Bridging 2 x 10 reps  Hook lying 3 way clams with GTB x 20 reps each  Piriformis stretches 3 x 30" (IR and ER techniques)  Side lying hip abd 2 x 10 reps      Dead lifts with 10# from 6" step 2 x 10 reps  +around the worlds with 10#kb 10 cw, 10 ccw    Lateral walking 2 x 40 ft with red band  Hesitation marches 2 x 40 ft    Standing hip ext 20 reps    S/L LQL stretch 5 x " "30"    LLD 3 x 15"    Nelsy participated in neuromuscular re-education activities to improve: Balance, Proprioception, and Posture for 00 minutes. The following activities were included:    Nelsy received hot pack for 00 minutes to low back.    Nelsy received cold pack for 00 minutes to low back.      Home Exercises Provided and Patient Education Provided     Education provided:   Discussed standing QL stretch    Written Home Exercises Provided: Patient instructed to cont prior HEP.  Exercises were reviewed and Nelsy was able to demonstrate them prior to the end of the session.  Nelsy demonstrated good  understanding of the education provided.     See EMR under Patient Instructions for exercises provided prior visit.    Assessment   Side lying QL stretch effective for targeting area of discomfort. Continued with core strengthening and low back strengthening. Progressed to 10#kb for dead lifts.    Nelsy Is progressing well towards her goals.   Pt prognosis is Good.     Pt will continue to benefit from skilled outpatient physical therapy to address the deficits listed in the problem list box on initial evaluation, provide pt/family education and to maximize pt's level of independence in the home and community environment.     Pt's spiritual, cultural and educational needs considered and pt agreeable to plan of care and goals.    Anticipated barriers to physical therapy: none    Goals:   Short Term Goals: 4 weeks   Independent with HEP. (In progress)  Report decreased lumbar and R LE pain < or =  6/10 with adls such as turning in bed, getting out of a chair, leaning over, and loading the . (In progress)  Increased MMT for B LE by 1/2 muscle grade to promote proper pelvic stability to decrease  lumbar and R LE pain < or =  6/10 with adls such as turning in bed, getting out of a chair, leaning over, and loading the .  (In progress)     Long Term Goals: 8 weeks   Report decreased lumbar and R LE pain < or =  3/10 " with adls such as turning in bed, getting out of a chair, leaning over, and loading the . (In progress)  Increased MMT for B LE by 1 muscle grade to promote proper pelvic stability to decrease lumbar and R LE pain < or =  3/10 with adls such as turning in bed, getting out of a chair, leaning over, and loading the .  (In progress)  Increased flexibility in R hamstrings to -20 deg with 90/90 test to promote proper pelvic stability to decrease lumbar and R LE pain < or =  3/10 with adls such as turning in bed, getting out of a chair, leaning over, and loading the . (In progress)  Increased flexibility in B piriformis and R IT bands to promote proper pelvic stability to decrease lumbar and R LE pain < or =  3/10 with adls such as turning in bed, getting out of a chair, leaning over, and loading the . (In progress)  Patient to achieve CJ (at least 20% < 40% impaired, limited or restricted) level on the FOTO Outcomes Measurement System. (In progress)    Plan     Continue with core strengthening, LE stretching and strengthening. Will add weight for dead lifts next visit.      Amilcar Robbins, PT

## 2023-01-06 ENCOUNTER — CLINICAL SUPPORT (OUTPATIENT)
Dept: REHABILITATION | Facility: OTHER | Age: 78
End: 2023-01-06
Payer: MEDICARE

## 2023-01-06 DIAGNOSIS — M54.41 ACUTE RIGHT-SIDED LOW BACK PAIN WITH RIGHT-SIDED SCIATICA: Primary | ICD-10-CM

## 2023-01-06 DIAGNOSIS — R29.3 POSTURAL IMBALANCE: ICD-10-CM

## 2023-01-06 DIAGNOSIS — M62.81 MUSCLE WEAKNESS OF LOWER EXTREMITY: ICD-10-CM

## 2023-01-06 PROCEDURE — 97110 THERAPEUTIC EXERCISES: CPT | Mod: PN

## 2023-01-06 NOTE — PROGRESS NOTES
"[facility]   Physical Therapy Treatment Note     Name: Nelsy Mckenna  Clinic Number: [id number]    Therapy Diagnosis:   Encounter Diagnoses   Name Primary?    Acute right-sided low back pain with right-sided sciatica Yes    Muscle weakness of lower extremity     Postural imbalance          Physician: Harmeet Reyes MD    Visit Date: 1/6/2023  Physician Orders: PT Eval and Treat   Medical Diagnosis: M41.50 (ICD-10-CM) - Degenerative scoliosis  Evaluation Date: 12/7/2022  Authorization Period Expiration: 1/4/2023  Plan of Care Certification Period: 2/1/2023  Visit # / Visits authorized: 7 (6/11)  FOTO: 3/ 5  (12/28/2022)  PTA Visit #: 0/5     Time In: 12:45 pm  Time Out: 1:28 pm  Total Billable Time: 43 minutes    Precautions: Standard    Subjective   Pt reports: her back is feeling pretty good today. States she has been working out with her . States the stretches last session were helpful.    She was compliant with home exercise program.  Response to previous treatment: the stretches were good  Functional change: transitional movements are getting better.    Pain: 0/10  Location: R low back    OBJECTIVE     Objective Measures updated at progress report unless specified.     Treatment     Nelsy received therapeutic exercises to develop strength, ROM, flexibility, posture, and core stabilization for 43 minutes including:  LTR x 3'  Supine TA marches 2 x 10  PPT 20 x 5"  Bridging 2 x 10 reps  Hook lying 3 way clams with GTB x 20 reps each  Piriformis stretches 3 x 30" (IR and ER techniques)  Side lying hip abd 2 x 10 reps      Dead lifts with 10# from 6" step 2 x 10 reps  around the worlds with 10#kb 10 cw, 10 ccw    Lateral walking 2 x 40 ft with red band  Hesitation marches 2 x 40 ft    Standing hip ext 20 reps    S/L L QL stretch 5 x 30"    LLD 3 x 15"    Nelsy participated in neuromuscular re-education activities to improve: Balance, Proprioception, and Posture for 00 minutes. The following " activities were included:    Nelsy received hot pack for 00 minutes to low back.    Nelsy received cold pack for 00 minutes to low back.      Home Exercises Provided and Patient Education Provided     Education provided:   Discussed standing QL stretch    Written Home Exercises Provided: Patient instructed to cont prior HEP.  Exercises were reviewed and Nelsy was able to demonstrate them prior to the end of the session.  Nelsy demonstrated good  understanding of the education provided.     See EMR under Patient Instructions for exercises provided prior visit.    Assessment   Will attempt quadruped core strengthening next session. Discussed the use of ice at home for pain reduction.     Nelsy Is progressing well towards her goals.   Pt prognosis is Good.     Pt will continue to benefit from skilled outpatient physical therapy to address the deficits listed in the problem list box on initial evaluation, provide pt/family education and to maximize pt's level of independence in the home and community environment.     Pt's spiritual, cultural and educational needs considered and pt agreeable to plan of care and goals.    Anticipated barriers to physical therapy: none    Goals:   Short Term Goals: 4 weeks   Independent with HEP. (In progress)  Report decreased lumbar and R LE pain < or =  6/10 with adls such as turning in bed, getting out of a chair, leaning over, and loading the . (In progress)  Increased MMT for B LE by 1/2 muscle grade to promote proper pelvic stability to decrease  lumbar and R LE pain < or =  6/10 with adls such as turning in bed, getting out of a chair, leaning over, and loading the .  (In progress)     Long Term Goals: 8 weeks   Report decreased lumbar and R LE pain < or =  3/10 with adls such as turning in bed, getting out of a chair, leaning over, and loading the . (In progress)  Increased MMT for B LE by 1 muscle grade to promote proper pelvic stability to decrease lumbar and  R LE pain < or =  3/10 with adls such as turning in bed, getting out of a chair, leaning over, and loading the .  (In progress)  Increased flexibility in R hamstrings to -20 deg with 90/90 test to promote proper pelvic stability to decrease lumbar and R LE pain < or =  3/10 with adls such as turning in bed, getting out of a chair, leaning over, and loading the . (In progress)  Increased flexibility in B piriformis and R IT bands to promote proper pelvic stability to decrease lumbar and R LE pain < or =  3/10 with adls such as turning in bed, getting out of a chair, leaning over, and loading the . (In progress)  Patient to achieve CJ (at least 20% < 40% impaired, limited or restricted) level on the FOTO Outcomes Measurement System. (In progress)    Plan     Continue with core strengthening, LE stretching and strengthening. Will add weight for dead lifts next visit.      Amilcar Robbins, PT

## 2023-01-09 ENCOUNTER — CLINICAL SUPPORT (OUTPATIENT)
Dept: REHABILITATION | Facility: OTHER | Age: 78
End: 2023-01-09
Payer: MEDICARE

## 2023-01-09 DIAGNOSIS — R29.3 POSTURAL IMBALANCE: ICD-10-CM

## 2023-01-09 DIAGNOSIS — M54.41 ACUTE RIGHT-SIDED LOW BACK PAIN WITH RIGHT-SIDED SCIATICA: Primary | ICD-10-CM

## 2023-01-09 DIAGNOSIS — M62.81 MUSCLE WEAKNESS OF LOWER EXTREMITY: ICD-10-CM

## 2023-01-09 PROCEDURE — 97110 THERAPEUTIC EXERCISES: CPT | Mod: PN,CQ

## 2023-01-09 NOTE — PROGRESS NOTES
"[facility]   Physical Therapy Treatment Note     Name: Nelsy Mckenna  Clinic Number: [id number]    Therapy Diagnosis:   Encounter Diagnoses   Name Primary?    Acute right-sided low back pain with right-sided sciatica Yes    Muscle weakness of lower extremity     Postural imbalance          Physician: Harmeet Reyes MD    Visit Date: 1/9/2023  Physician Orders: PT Eval and Treat   Medical Diagnosis: M41.50 (ICD-10-CM) - Degenerative scoliosis  Evaluation Date: 12/7/2022  Authorization Period Expiration: 1/4/2023  Plan of Care Certification Period: 2/1/2023  Visit # / Visits authorized: 8 (7/11)  FOTO: 4/ 5  (12/28/2022)  PTA Visit #: 1/5     Time In: 1230  Time Out: 1315  Total Billable Time: 45 minutes    Precautions: Standard    Subjective   Pt states having some achiness in R Low back 2* performing yoga prior to today's session.      She was compliant with home exercise program.  Response to previous treatment: no adverse effects  Functional change: no change reported   Pain: 0/10  Location: R low back    OBJECTIVE     Objective Measures updated at progress report unless specified.     Treatment     Nelsy received therapeutic exercises to develop strength, ROM, flexibility, posture, and core stabilization for 45 minutes including:  LTR x 3'  Supine TA marches 2 x 10  PPT 20 x 5"  Bridging 2 x 10 reps  Supine SKTC 10 x 5 sec hold   Hook lying 3 way clams with GTB x 20 reps each  Piriformis stretches 3 x 30" (IR and ER techniques)  Side lying hip abd 2 x 10 reps      Dead lifts with 10# from 6" step  x 10 reps  around the worlds with 10#kb 2 x 10 cw, 2 x 10 ccw    Lateral walking 4 x 40 ft with red band  Hesitation marches 2 x 40 ft    Standing hip ext 20 reps  Seated trunk flexion w/ Sb 5 x 10 sec   S/L L QL stretch 5 x 30"    LLD 3 x 15"    Nelsy participated in neuromuscular re-education activities to improve: Balance, Proprioception, and Posture for 00 minutes. The following activities were " included:    Nelsy received hot pack for 00 minutes to low back.    Nelsy received cold pack for 00 minutes to low back.      Home Exercises Provided and Patient Education Provided     Education provided:   Pt edu on proper exercise technique.      Written Home Exercises Provided: Patient instructed to cont prior HEP.  Exercises were reviewed and Nelsy was able to demonstrate them prior to the end of the session.  Nelsy demonstrated good  understanding of the education provided.     See EMR under Patient Instructions for exercises provided prior visit.    Assessment   Pt nisha tx well w/ no c/o pn.  Pt displayed increased strength and endurance during therex.  Pt cont to have core and glute weakness.      Nelsy Is progressing well towards her goals.   Pt prognosis is Good.     Pt will continue to benefit from skilled outpatient physical therapy to address the deficits listed in the problem list box on initial evaluation, provide pt/family education and to maximize pt's level of independence in the home and community environment.     Pt's spiritual, cultural and educational needs considered and pt agreeable to plan of care and goals.    Anticipated barriers to physical therapy: none    Goals:   Short Term Goals: 4 weeks   Independent with HEP. (In progress)  Report decreased lumbar and R LE pain < or =  6/10 with adls such as turning in bed, getting out of a chair, leaning over, and loading the . (In progress)  Increased MMT for B LE by 1/2 muscle grade to promote proper pelvic stability to decrease  lumbar and R LE pain < or =  6/10 with adls such as turning in bed, getting out of a chair, leaning over, and loading the .  (In progress)     Long Term Goals: 8 weeks   Report decreased lumbar and R LE pain < or =  3/10 with adls such as turning in bed, getting out of a chair, leaning over, and loading the . (In progress)  Increased MMT for B LE by 1 muscle grade to promote proper pelvic stability to  decrease lumbar and R LE pain < or =  3/10 with adls such as turning in bed, getting out of a chair, leaning over, and loading the .  (In progress)  Increased flexibility in R hamstrings to -20 deg with 90/90 test to promote proper pelvic stability to decrease lumbar and R LE pain < or =  3/10 with adls such as turning in bed, getting out of a chair, leaning over, and loading the . (In progress)  Increased flexibility in B piriformis and R IT bands to promote proper pelvic stability to decrease lumbar and R LE pain < or =  3/10 with adls such as turning in bed, getting out of a chair, leaning over, and loading the . (In progress)  Patient to achieve CJ (at least 20% < 40% impaired, limited or restricted) level on the FOTO Outcomes Measurement System. (In progress)    Plan     Continue with core strengthening, LE stretching and strengthening. Will add weight for dead lifts next visit.      Augustine Ojeda, PTA

## 2023-01-11 ENCOUNTER — CLINICAL SUPPORT (OUTPATIENT)
Dept: REHABILITATION | Facility: OTHER | Age: 78
End: 2023-01-11
Payer: MEDICARE

## 2023-01-11 DIAGNOSIS — M62.81 MUSCLE WEAKNESS OF LOWER EXTREMITY: ICD-10-CM

## 2023-01-11 DIAGNOSIS — M54.41 ACUTE RIGHT-SIDED LOW BACK PAIN WITH RIGHT-SIDED SCIATICA: Primary | ICD-10-CM

## 2023-01-11 DIAGNOSIS — R29.3 POSTURAL IMBALANCE: ICD-10-CM

## 2023-01-11 PROCEDURE — 97110 THERAPEUTIC EXERCISES: CPT | Mod: PN

## 2023-01-11 NOTE — PROGRESS NOTES
"[facility]   Physical Therapy Treatment Note     Name: Nelsy Mckenna  Clinic Number: [id number]    Therapy Diagnosis:   Encounter Diagnoses   Name Primary?    Acute right-sided low back pain with right-sided sciatica Yes    Muscle weakness of lower extremity     Postural imbalance        Physician: Harmeet Reyes MD    Visit Date: 2023  Physician Orders: PT Eval and Treat   Medical Diagnosis: M41.50 (ICD-10-CM) - Degenerative scoliosis  Evaluation Date: 2022  Authorization Period Expiration: additional pending  Plan of Care Certification Period: 2023  Visit # / Visits authorized: 9 (additional pending)  FOTO: 5/5  (2022)  PTA Visit #: 0/5     Time In: 11:57 am  Time Out: 12:39 pm  Total Billable Time: 38 minutes    Precautions: Standard    Subjective   Pt states her low back is bugging her today. Reports soreness across her back. Certain movements can hurt "a lot." "Twisting of anything "off center" can cause pain in the back. States the increased low back pain started yesterday afternoon.  No known reason for the increased low back pain yesterday    She was compliant with home exercise program.  Response to previous treatment: no adverse effects  Functional change: no change reported     Pain: 4-5/10.  Location: R low back    OBJECTIVE     Objective Measures updated at progress report unless specified.     CMS Impairment/Limitation/Restriction for FOTO Lumbar Spine Survey    Therapist reviewed FOTO scores for Nelsy Mckenna on 2023.   FOTO documents entered into [employer] - see Media section.    Limitation Score: 47%  Category: Mobility    Current : CK = at least 40% but < 60% impaired, limited or restricted  Goal: CJ = at least 20% but < 40% impaired, limited or restricted     2023     Flexibility testing:  - hamstrings:           R: WNL, R:-20 deg, L: WNL      - gastrocnemius:     B: WNL        - piriformis:              B: tight,  25%     - quadriceps:          " "B WNL      - hip flexors:           B: WNL  - hip adductors:      B: WNL  - IT Bands:             R: tight, decreased 25%     MMT R L   Hip flexion 4+/5 5/5   Hip abduction 4+/5 5/5   Hip extension 5/5 5/5   Hip ER 5/5 5/5   Hip IR 5/5 5/5   Knee extension 5/5 5/5   Knee flexion 5/5 5/5   Ankle dorsiflexion 5/5 5/5   Ankle plantar flexion 5/5 5/5   Ankle inversion 5/5 5/5   Ankle eversion 5/5 5/5         Treatment     Nelsy received therapeutic exercises to develop strength, ROM, flexibility, posture, and core stabilization for 38 minutes including:  LTR x 3'  Supine TA marches 2 x 10  PPT 20 x 5"  +child pose 5 x 15" straight, 5 x 15" to each heel  +bird dog x 10 reps      Bridging 2 x 10 reps  Supine SKTC 10 x 5 sec hold   Hook lying 3 way clams with GTB x 20 reps each  Piriformis stretches 3 x 30" (IR and ER techniques)  Side lying hip abd 2 x 10 reps    Dead lifts with 10# from 6" step  x 10 reps held today due to low back pain with forward flexion  around the worlds with 10#KB 2 x 10 cw, 2 x 10 ccw    Lateral walking 4 x 40 ft with red band  Hesitation marches 2 x 40 ft    Standing hip ext 2 x 10 reps  Seated trunk flexion w/ Sb 5 x 10 sec   S/L L QL stretch 5 x 30"    LLD 3 x 15"    Nelsy participated in neuromuscular re-education activities to improve: Balance, Proprioception, and Posture for 00 minutes. The following activities were included:    Nelsy received hot pack for 00 minutes to low back.    Nelsy received cold pack for 4 minutes to low back.      Home Exercises Provided and Patient Education Provided     Education provided:   Pt edu on proper exercise technique.      Written Home Exercises Provided: Patient instructed to cont prior HEP.  Exercises were reviewed and Nelsy was able to demonstrate them prior to the end of the session.  Nelsy demonstrated good  understanding of the education provided.     See EMR under Patient Instructions for exercises provided prior visit.    Assessment   Acute flare up of low " back pain starting yesterday limiting forward flexion. Proceeded with core strengthening today. Discussed use of ice at home for pain modulation. Patient did not tolerate the ice pack today for longer than 4 minutes.    Nelsy Is progressing well towards her goals.   Pt prognosis is Good.     Pt will continue to benefit from skilled outpatient physical therapy to address the deficits listed in the problem list box on initial evaluation, provide pt/family education and to maximize pt's level of independence in the home and community environment.     Pt's spiritual, cultural and educational needs considered and pt agreeable to plan of care and goals.    Anticipated barriers to physical therapy: none    Goals:   Short Term Goals: 4 weeks   Independent with HEP. (In progress)  Report decreased lumbar and R LE pain < or =  6/10 with adls such as turning in bed, getting out of a chair, leaning over, and loading the . (In progress)  Increased MMT for B LE by 1/2 muscle grade to promote proper pelvic stability to decrease  lumbar and R LE pain < or =  6/10 with adls such as turning in bed, getting out of a chair, leaning over, and loading the .  (In progress)     Long Term Goals: 8 weeks   Report decreased lumbar and R LE pain < or =  3/10 with adls such as turning in bed, getting out of a chair, leaning over, and loading the . (In progress)  Increased MMT for B LE by 1 muscle grade to promote proper pelvic stability to decrease lumbar and R LE pain < or =  3/10 with adls such as turning in bed, getting out of a chair, leaning over, and loading the .  (In progress)  Increased flexibility in R hamstrings to -20 deg with 90/90 test to promote proper pelvic stability to decrease lumbar and R LE pain < or =  3/10 with adls such as turning in bed, getting out of a chair, leaning over, and loading the . (In progress)  Increased flexibility in B piriformis and R IT bands to promote  proper pelvic stability to decrease lumbar and R LE pain < or =  3/10 with adls such as turning in bed, getting out of a chair, leaning over, and loading the . (In progress)  Patient to achieve CJ (at least 20% < 40% impaired, limited or restricted) level on the FOTO Outcomes Measurement System. (In progress)    Plan     Continue with core strengthening, LE stretching and strengthening.      Amilcar Robbins, PT

## 2023-01-23 ENCOUNTER — CLINICAL SUPPORT (OUTPATIENT)
Dept: REHABILITATION | Facility: OTHER | Age: 78
End: 2023-01-23
Payer: MEDICARE

## 2023-01-23 DIAGNOSIS — R29.3 POSTURAL IMBALANCE: ICD-10-CM

## 2023-01-23 DIAGNOSIS — M54.41 ACUTE RIGHT-SIDED LOW BACK PAIN WITH RIGHT-SIDED SCIATICA: Primary | ICD-10-CM

## 2023-01-23 DIAGNOSIS — M62.81 MUSCLE WEAKNESS OF LOWER EXTREMITY: ICD-10-CM

## 2023-01-23 PROCEDURE — 97110 THERAPEUTIC EXERCISES: CPT | Mod: PN

## 2023-01-23 NOTE — PROGRESS NOTES
"[facility]   Physical Therapy Treatment Note     Name: Nelsy Mckenna  Clinic Number: [id number]    Therapy Diagnosis:   Encounter Diagnoses   Name Primary?    Acute right-sided low back pain with right-sided sciatica Yes    Muscle weakness of lower extremity     Postural imbalance        Physician: Harmeet Reyes MD    Visit Date: 2023  Physician Orders: PT Eval and Treat   Medical Diagnosis: M41.50 (ICD-10-CM) - Degenerative scoliosis  Evaluation Date: 2022  Authorization Period Expiration: additional pending  Plan of Care Certification Period: 2023  Visit # / Visits authorized: 10 (additional pending)  FOTO: 3/5  (2023)  PTA Visit #: 0/5     Time In: 11:20 am  Time Out: 11:55 pm  Total Billable Time: 35 minutes    Precautions: Standard    Subjective   Pt states she is feeling "pretty well" today. Thinks the therapy is helping. States she has been pain free recently.    She was compliant with home exercise program.  Response to previous treatment: felt better.  Functional change: moving better, turning her trunk for ADLs, standing up from a chair has improved    Pain: 0/10.  Location: R low back    OBJECTIVE     Objective Measures updated at progress report unless specified.     CMS Impairment/Limitation/Restriction for FOTO Lumbar Spine Survey    Therapist reviewed FOTO scores for Nelsy Mckenna on 2023.   FOTO documents entered into [employer] - see Media section.    Limitation Score: 47%  Category: Mobility    Current : CK = at least 40% but < 60% impaired, limited or restricted  Goal: CJ = at least 20% but < 40% impaired, limited or restricted     2023     Flexibility testing:  - hamstrings:           R: WNL, R:-20 deg, L: WNL      - gastrocnemius:     B: WNL        - piriformis:              B: tight,  25%     - quadriceps:          B WNL      - hip flexors:           B: WNL  - hip adductors:      B: WNL  - IT Bands:             R: tight, decreased 25%     MMT R " "L   Hip flexion 4+/5 5/5   Hip abduction 4+/5 5/5   Hip extension 5/5 5/5   Hip ER 5/5 5/5   Hip IR 5/5 5/5   Knee extension 5/5 5/5   Knee flexion 5/5 5/5   Ankle dorsiflexion 5/5 5/5   Ankle plantar flexion 5/5 5/5   Ankle inversion 5/5 5/5   Ankle eversion 5/5 5/5         Treatment     Nelsy received therapeutic exercises to develop strength, ROM, flexibility, posture, and core stabilization for 35 minutes including:  LTR x 3'  Supine TA marches 2 x 10  PPT 20 x 5"  child pose 5 x 15" straight, 5 x 15" to each heel  bird dog x 10 reps    Bridging 2 x 10 reps  Supine SKTC 10 x 5 sec hold   Hook lying 3 way clams with GTB x 20 reps each  Piriformis stretches 3 x 30" (IR and ER techniques)  Side lying hip abd 2 x 10 reps    Dead lifts with 10# from 6" step  x 10 reps   around the worlds with 10#KB 2 x 10 cw, 2 x 10 ccw    Lateral walking 4 x 40 ft with red band  Hesitation marches 2 x 40 ft    Bent over hip ext 2 x 10 reps  Seated trunk flexion w/ Sb 5 x 10 sec   S/L L QL stretch 5 x 30"    LLD 3 x 15"    Nelsy participated in neuromuscular re-education activities to improve: Balance, Proprioception, and Posture for 00 minutes. The following activities were included:    Nelsy received hot pack for 00 minutes to low back.    Nelsy received cold pack for 00 minutes to low back.      Home Exercises Provided and Patient Education Provided     Education provided:   Pt edu on proper exercise technique.      Written Home Exercises Provided: Patient instructed to cont prior HEP.  Exercises were reviewed and Nelsy was able to demonstrate them prior to the end of the session.  Nelsy demonstrated good  understanding of the education provided.     See EMR under Patient Instructions for exercises provided prior visit.    Assessment   Feeling better today than last session. Resumed dead lifts with 10# kettle bell today. Also had patient perform hip extension bent over onto table for increased range of motion and to decrease arching of the " back during the exercise.    Nelsy Is progressing well towards her goals.   Pt prognosis is Good.     Pt will continue to benefit from skilled outpatient physical therapy to address the deficits listed in the problem list box on initial evaluation, provide pt/family education and to maximize pt's level of independence in the home and community environment.     Pt's spiritual, cultural and educational needs considered and pt agreeable to plan of care and goals.    Anticipated barriers to physical therapy: none    Goals:   Short Term Goals: 4 weeks   Independent with HEP. (ongoing)  Report decreased lumbar and R LE pain < or =  6/10 with adls such as turning in bed, getting out of a chair, leaning over, and loading the . (met)  Increased MMT for B LE by 1/2 muscle grade to promote proper pelvic stability to decrease  lumbar and R LE pain < or =  6/10 with adls such as turning in bed, getting out of a chair, leaning over, and loading the .  (met)     Long Term Goals: 8 weeks   Report decreased lumbar and R LE pain < or =  3/10 with adls such as turning in bed, getting out of a chair, leaning over, and loading the . (Progressing, not met)  Increased MMT for B LE by 1 muscle grade to promote proper pelvic stability to decrease lumbar and R LE pain < or =  3/10 with adls such as turning in bed, getting out of a chair, leaning over, and loading the .  Progressing, not met)  Increased flexibility in R hamstrings to -20 deg with 90/90 test to promote proper pelvic stability to decrease lumbar and R LE pain < or =  3/10 with adls such as turning in bed, getting out of a chair, leaning over, and loading the . Progressing, not met)  Increased flexibility in B piriformis and R IT bands to promote proper pelvic stability to decrease lumbar and R LE pain < or =  3/10 with adls such as turning in bed, getting out of a chair, leaning over, and loading the . Progressing, not  met)  Patient to achieve CJ (at least 20% < 40% impaired, limited or restricted) level on the FOTO Outcomes Measurement System. (Progressing, not met)    Plan     Continue with core strengthening, LE stretching and strengthening.      Amilcar Robbins, PT

## 2023-01-26 ENCOUNTER — CLINICAL SUPPORT (OUTPATIENT)
Dept: REHABILITATION | Facility: OTHER | Age: 78
End: 2023-01-26
Payer: MEDICARE

## 2023-01-26 DIAGNOSIS — R29.3 POSTURAL IMBALANCE: ICD-10-CM

## 2023-01-26 DIAGNOSIS — M62.81 MUSCLE WEAKNESS OF LOWER EXTREMITY: ICD-10-CM

## 2023-01-26 DIAGNOSIS — M54.41 ACUTE RIGHT-SIDED LOW BACK PAIN WITH RIGHT-SIDED SCIATICA: Primary | ICD-10-CM

## 2023-01-26 PROCEDURE — 97530 THERAPEUTIC ACTIVITIES: CPT | Mod: PN

## 2023-01-26 PROCEDURE — 97110 THERAPEUTIC EXERCISES: CPT | Mod: PN

## 2023-01-26 NOTE — PROGRESS NOTES
[facility]   Physical Therapy Treatment Note     Name: Nelsy Mckenna  Clinic Number: [id number]    Therapy Diagnosis:   Encounter Diagnoses   Name Primary?    Acute right-sided low back pain with right-sided sciatica Yes    Muscle weakness of lower extremity     Postural imbalance        Physician: Harmeet Reyes MD    Visit Date: 2023  Physician Orders: PT Eval and Treat   Medical Diagnosis: M41.50 (ICD-10-CM) - Degenerative scoliosis  Evaluation Date: 2022  Authorization Period Expiration: additional pending  Plan of Care Certification Period: 2023  Visit # / Visits authorized: 10 (additional pending)  FOTO: 3/5  (2023)  PTA Visit #: 0/5     Time In: 3:15 m  Time Out: 4:00 pm  Total Billable Time: 45 minutes    Precautions: Standard    Subjective     Pt states she is almost all the way better. She still feels some tenderness in the back, but it doesn't hurt anymore and she's able to do everything.    She was compliant with home exercise program.  Response to previous treatment: felt better.  Functional change: moving better, turning her trunk for ADLs, standing up from a chair has improved    Pain: 0/10.  Location: R low back    OBJECTIVE     Objective Measures updated at progress report unless specified.     CMS Impairment/Limitation/Restriction for FOTO Lumbar Spine Survey    Therapist reviewed FOTO scores for Nelsy Mckenna on 2023.   FOTO documents entered into [employer] - see Media section.    Limitation Score: 47%  Category: Mobility    Current : CK = at least 40% but < 60% impaired, limited or restricted  Goal: CJ = at least 20% but < 40% impaired, limited or restricted     2023     Flexibility testing:  - hamstrings:           R: WNL, R:-20 deg, L: WNL      - gastrocnemius:     B: WNL        - piriformis:              B: tight,  25%     - quadriceps:          B WNL      - hip flexors:           B: WNL  - hip adductors:      B: WNL  - IT Bands:              "R: tight, decreased 25%     MMT R L   Hip flexion 4+/5 5/5   Hip abduction 4+/5 5/5   Hip extension 5/5 5/5   Hip ER 5/5 5/5   Hip IR 5/5 5/5   Knee extension 5/5 5/5   Knee flexion 5/5 5/5   Ankle dorsiflexion 5/5 5/5   Ankle plantar flexion 5/5 5/5   Ankle inversion 5/5 5/5   Ankle eversion 5/5 5/5         Treatment     Nelsy received therapeutic exercises to develop strength, ROM, flexibility, posture, and core stabilization for 20 minutes including:    LTR x 3'  Supine TA marches x 10  child pose 5 x 15" straight, 5 x 15" to each heel  bird dog x 10 reps    Single leg bridges 6x3 B  Hook lying 3 way clams with GTB x 20 reps each  Piriformis stretches 3 x 30" (IR and ER techniques)  Side lying hip abd 2 x 10 reps    Pallof press 7# at [employer] 8x3 B    High plank pt education on technique for pt to perform correctly in yoga, cues for PPT with resolution of LBP    Bent over hip ext 2 x 10 reps  Seated trunk flexion w/ Sb 5 x 10 sec   S/L L QL stretch 5 x 30"    Nelsy participated in dynamic functional therapeutic activities to improve functional performance for 25 minutes, including:    Dead lifts with 10# from floor 6x3  Hesitation marching with 3# UL 80 ft x 3    around the worlds with 10#KB 2 x 10 cw, 2 x 10 ccw    Lateral walking 4 x 40 ft with red band      Nelsy participated in neuromuscular re-education activities to improve: Balance, Proprioception, and Posture for 00 minutes. The following activities were included:    Nelsy received hot pack for 00 minutes to low back.    Nelsy received cold pack for 00 minutes to low back.      Home Exercises Provided and Patient Education Provided     Education provided:   Pt edu on proper exercise technique.      Written Home Exercises Provided: Patient instructed to cont prior HEP.  Exercises were reviewed and Nelsy was able to demonstrate them prior to the end of the session.  Nelsy demonstrated good  understanding of the education provided.     See EMR under Patient " Instructions for exercises provided prior visit.    Assessment     Able to continue with deadlifts today, modified to full depth today which pt performed without pain however with increased fatigue. Pt will likely discharge in 2 additional visits at end of POC, prepared today reviewed for pt to integrate deadlifts with her , and reviewed plank technique for when she returns to yoga. Pt demonstrating good understanding.    Nelsy Is progressing well towards her goals.   Pt prognosis is Good.     Pt will continue to benefit from skilled outpatient physical therapy to address the deficits listed in the problem list box on initial evaluation, provide pt/family education and to maximize pt's level of independence in the home and community environment.     Pt's spiritual, cultural and educational needs considered and pt agreeable to plan of care and goals.    Anticipated barriers to physical therapy: none    Goals:   Short Term Goals: 4 weeks   Independent with HEP. (ongoing)  Report decreased lumbar and R LE pain < or =  6/10 with adls such as turning in bed, getting out of a chair, leaning over, and loading the . (met)  Increased MMT for B LE by 1/2 muscle grade to promote proper pelvic stability to decrease  lumbar and R LE pain < or =  6/10 with adls such as turning in bed, getting out of a chair, leaning over, and loading the .  (met)     Long Term Goals: 8 weeks   Report decreased lumbar and R LE pain < or =  3/10 with adls such as turning in bed, getting out of a chair, leaning over, and loading the . (Progressing, not met)  Increased MMT for B LE by 1 muscle grade to promote proper pelvic stability to decrease lumbar and R LE pain < or =  3/10 with adls such as turning in bed, getting out of a chair, leaning over, and loading the .  Progressing, not met)  Increased flexibility in R hamstrings to -20 deg with 90/90 test to promote proper pelvic stability to  decrease lumbar and R LE pain < or =  3/10 with adls such as turning in bed, getting out of a chair, leaning over, and loading the . Progressing, not met)  Increased flexibility in B piriformis and R IT bands to promote proper pelvic stability to decrease lumbar and R LE pain < or =  3/10 with adls such as turning in bed, getting out of a chair, leaning over, and loading the . Progressing, not met)  Patient to achieve CJ (at least 20% < 40% impaired, limited or restricted) level on the FOTO Outcomes Measurement System. (Progressing, not met)    Plan     Continue with core strengthening, LE stretching and strengthening.      Mariama Don, PT, DPT

## 2023-01-31 ENCOUNTER — CLINICAL SUPPORT (OUTPATIENT)
Dept: REHABILITATION | Facility: OTHER | Age: 78
End: 2023-01-31
Payer: MEDICARE

## 2023-01-31 DIAGNOSIS — M54.41 ACUTE RIGHT-SIDED LOW BACK PAIN WITH RIGHT-SIDED SCIATICA: Primary | ICD-10-CM

## 2023-01-31 DIAGNOSIS — M62.81 MUSCLE WEAKNESS OF LOWER EXTREMITY: ICD-10-CM

## 2023-01-31 DIAGNOSIS — R29.3 POSTURAL IMBALANCE: ICD-10-CM

## 2023-01-31 PROCEDURE — 97530 THERAPEUTIC ACTIVITIES: CPT | Mod: PN

## 2023-01-31 PROCEDURE — 97110 THERAPEUTIC EXERCISES: CPT | Mod: PN

## 2023-01-31 NOTE — PROGRESS NOTES
[facility]   Physical Therapy Treatment Note     Name: Nelsy Mckenna  Clinic Number: [id number]    Therapy Diagnosis:   Encounter Diagnoses   Name Primary?    Acute right-sided low back pain with right-sided sciatica Yes    Muscle weakness of lower extremity     Postural imbalance        Physician: Harmeet Reyes MD    Visit Date: 2023  Physician Orders: PT Eval and Treat   Medical Diagnosis: M41.50 (ICD-10-CM) - Degenerative scoliosis  Evaluation Date: 2022  Authorization Period Expiration: additional pending  Plan of Care Certification Period: 2023  Visit # / Visits authorized: 10 (additional pending)  FOTO: 3/5  (2023)  PTA Visit #: 0/5     Time In: 2:20 pm  Time Out: 3:00  Total Billable Time: 40 minutes    Precautions: Standard    Subjective     Pt states she is back to the things she was doing before. She went to yoga yesterday.    She was compliant with home exercise program.  Response to previous treatment: felt better.  Functional change: moving better, turning her trunk for ADLs, standing up from a chair has improved    Pain: 0/10.  Location: R low back    OBJECTIVE     Objective Measures updated at progress report unless specified.     CMS Impairment/Limitation/Restriction for FOTO Lumbar Spine Survey    Therapist reviewed FOTO scores for Nelsy Mckenna on 2023.   FOTO documents entered into [employer] - see Media section.    Limitation Score: 47%  Category: Mobility    Current : CK = at least 40% but < 60% impaired, limited or restricted  Goal: CJ = at least 20% but < 40% impaired, limited or restricted     2023     Flexibility testing:  - hamstrings:           R: WNL, R:-20 deg, L: WNL      - gastrocnemius:     B: WNL        - piriformis:              B: tight,  25%     - quadriceps:          B WNL      - hip flexors:           B: WNL  - hip adductors:      B: WNL  - IT Bands:             R: tight, decreased 25%     MMT R L   Hip flexion 4+/5 5/5   Hip  "abduction 4+/5 5/5   Hip extension 5/5 5/5   Hip ER 5/5 5/5   Hip IR 5/5 5/5   Knee extension 5/5 5/5   Knee flexion 5/5 5/5   Ankle dorsiflexion 5/5 5/5   Ankle plantar flexion 5/5 5/5   Ankle inversion 5/5 5/5   Ankle eversion 5/5 5/5         Treatment     Nelsy received therapeutic exercises to develop strength, ROM, flexibility, posture, and core stabilization for 15 minutes including:    LTR x 3'  Supine TA marches x 10  child pose 5 x 15" straight, 5 x 15" to each heel  bird dog x 10 reps    Single leg bridges 6x3 B  Hook lying 3 way clams with GTB x 20 reps each  Piriformis stretches 3 x 30" (IR and ER techniques)  Side lying hip abd 2 x 10 reps    Pallof press 7# at [employer] 10x2 B    High plank pt education on technique for pt to perform correctly in yoga, cues for PPT with resolution of LBP 20s x 3    Bent over hip ext 2 x 10 reps  Seated trunk flexion w/ Sb 5 x 10 sec   S/L L QL stretch 5 x 30"    Nelsy participated in dynamic functional therapeutic activities to improve functional performance for 25 minutes, including:    Updated and reviewed HEP    Dead lifts with 15# from floor 5x3    Hesitation marching with 4# UL 80 ft x 3    SLS around the worlds with 10# KB 5X2 each side, each way    Lateral walking 4 x 40 ft with red band      Nelsy participated in neuromuscular re-education activities to improve: Balance, Proprioception, and Posture for 00 minutes. The following activities were included:    Nelsy received hot pack for 00 minutes to low back.    Nelsy received cold pack for 00 minutes to low back.      Home Exercises Provided and Patient Education Provided     Education provided:   Pt edu on proper exercise technique.      Written Home Exercises Provided: Patient instructed to cont prior HEP.  Exercises were reviewed and Nelsy was able to demonstrate them prior to the end of the session.  Nelsy demonstrated good  understanding of the education provided.     See EMR under Patient Instructions for exercises " provided prior visit.    Assessment     Provided updated HEP today as pt anticipating D/C at [place]. Overall progressing well and able to tolerated 15# deadlift today with LE fatigue, but no complaints of pain and good hip hinge pattern. Encouraged continued posterior chain strengthening past D/C for bone health and functional strength.    Nelsy Is progressing well towards her goals.   Pt prognosis is Good.     Pt will continue to benefit from skilled outpatient physical therapy to address the deficits listed in the problem list box on initial evaluation, provide pt/family education and to maximize pt's level of independence in the home and community environment.     Pt's spiritual, cultural and educational needs considered and pt agreeable to plan of care and goals.    Anticipated barriers to physical therapy: none    Goals:   Short Term Goals: 4 weeks   Independent with HEP. (ongoing)  Report decreased lumbar and R LE pain < or =  6/10 with adls such as turning in bed, getting out of a chair, leaning over, and loading the . (met)  Increased MMT for B LE by 1/2 muscle grade to promote proper pelvic stability to decrease  lumbar and R LE pain < or =  6/10 with adls such as turning in bed, getting out of a chair, leaning over, and loading the .  (met)     Long Term Goals: 8 weeks   Report decreased lumbar and R LE pain < or =  3/10 with adls such as turning in bed, getting out of a chair, leaning over, and loading the . (Progressing, not met)  Increased MMT for B LE by 1 muscle grade to promote proper pelvic stability to decrease lumbar and R LE pain < or =  3/10 with adls such as turning in bed, getting out of a chair, leaning over, and loading the .  Progressing, not met)  Increased flexibility in R hamstrings to -20 deg with 90/90 test to promote proper pelvic stability to decrease lumbar and R LE pain < or =  3/10 with adls such as turning in bed, getting out of a chair, leaning  over, and loading the . Progressing, not met)  Increased flexibility in B piriformis and R IT bands to promote proper pelvic stability to decrease lumbar and R LE pain < or =  3/10 with adls such as turning in bed, getting out of a chair, leaning over, and loading the . Progressing, not met)  Patient to achieve CJ (at least 20% < 40% impaired, limited or restricted) level on the FOTO Outcomes Measurement System. (Progressing, not met)    Plan     Continue with core strengthening, LE stretching and strengthening.    Mariama Don, PT, DPT

## 2023-02-01 NOTE — PROGRESS NOTES
"[facility]  Physical Therapy Discharge Note    Name: Nelsy Mckenna  Clinic Number: [id number]    Therapy Diagnosis:   Encounter Diagnoses   Name Primary?    Acute right-sided low back pain with right-sided sciatica Yes    Muscle weakness of lower extremity     Postural imbalance      Physician: Harmeet Reyes MD    Physician Orders: PT Eval and Treat   Medical Diagnosis: M41.50 (ICD-10-CM) - Degenerative scoliosis  Evaluation Date: 2022    Date of Last visit: 2023    Total Visits Received: 13    Time In: 10:45 am  Time Out: 11:05 am  Total Billable Time:  15 minutes    Precautions: Standard    Subjective     Pt states she is feeling better and doing things like she did before. States she is ready for discharge today. States she is still careful, particularly with lifting heavy things or getting things out of the oven    She was compliant with home exercise program.  Response to previous treatment: "Good"  Functional change: has returned to her previous activities    Pain: 0/10.  Location: R low back        OBJECTIVE     Objective Measures updated at progress report unless specified.     CMS Impairment/Limitation/Restriction for FOTO Lumbar Spine Survey    Therapist reviewed FOTO scores for Nelsy Mckenna on 2023.   FOTO documents entered into [employer] - see Media section.    Limitation Score: %  Category: Mobility    Discharge: CK = at least 40% but < 60% impaired, limited or restricted  Goal: CJ = at least 20% but < 40% impaired, limited or restricted     2023     Flexibility testing:  - hamstrings:          R: WNL, R:-20 deg, L: WNL      - gastrocnemius:    B: WNL        - piriformis:             B: tight,  25%     - quadriceps:          B WNL      - hip flexors:           B: WNL  - hip adductors:      B: WNL  - IT Bands:             R: tight, decreased 25%     MMT R L   Hip flexion 4+/5 5/5   Hip abduction 4+/5 5/5   Hip extension 5/5 5/5   Hip ER 5/5 5/5   Hip IR 5/5 5/5 "   Knee extension 5/5 5/5   Knee flexion 5/5 5/5   Ankle dorsiflexion 5/5 5/5   Ankle plantar flexion 5/5 5/5   Ankle inversion 5/5 5/5   Ankle eversion 5/5 5/5         Treatment     Nelsy received therapeutic exercises to develop strength, ROM, flexibility, posture, and core stabilization for  minutes including:  Reassessed strength and functional limitations  Reviewed HEP with patient and answered questions      Home Exercises Provided and Patient Education Provided     Education provided:   Bridging, LTR, side lying HEP    Written Home Exercises Provided: yes.  Exercises were reviewed and Nelsy was able to demonstrate them prior to the end of the session.  Nelsy demonstrated good  understanding of the education provided.     See EMR under Patient Instructions for exercises provided 2/2/2023.        ASSESSMENT      Patient has progressed in OP PT, increased R hip strength. Patient is experiencing less pain and has returned to yoga. Patient is still cautious with lifting.    Discharge reason: Patient has met all of her goals. Pt feels comfortable with discharge at this time.    Discharge FOTO Score: 69 raw, 31% functional limitation    Goals:   Short Term Goals: 4 weeks   Independent with HEP. (met)  Report decreased lumbar and R LE pain < or =  6/10 with adls such as turning in bed, getting out of a chair, leaning over, and loading the . (met)  Increased MMT for B LE by 1/2 muscle grade to promote proper pelvic stability to decrease  lumbar and R LE pain < or =  6/10 with adls such as turning in bed, getting out of a chair, leaning over, and loading the .  (met)     Long Term Goals: 8 weeks   Report decreased lumbar and R LE pain < or =  3/10 with adls such as turning in bed, getting out of a chair, leaning over, and loading the . (met)  Increased MMT for B LE by 1 muscle grade to promote proper pelvic stability to decrease lumbar and R LE pain < or =  3/10 with adls such as turning in bed,  getting out of a chair, leaning over, and loading the .  (met)  Increased flexibility in R hamstrings to -20 deg with 90/90 test to promote proper pelvic stability to decrease lumbar and R LE pain < or =  3/10 with adls such as turning in bed, getting out of a chair, leaning over, and loading the . (met)  Increased flexibility in B piriformis and R IT bands to promote proper pelvic stability to decrease lumbar and R LE pain < or =  3/10 with adls such as turning in bed, getting out of a chair, leaning over, and loading the . (met)  Patient to achieve CJ (at least 20% < 40% impaired, limited or restricted) level on the FOTO Outcomes Measurement System. (met)      PLAN   This patient is discharged from Physical Therapy      Amilcar Robbins, PT                Physician: Harmeet Reyes MD    Visit Date: 2/2/2023    Authorization Period Expiration: additional pending  Plan of Care Certification Period: 2/1/2023  Visit # / Visits authorized: 10 (additional pending)  FOTO: 3/5  (1/11/2023)  PTA Visit #: 0/5   Nelsy participated in neuromuscular re-education activities to improve: Balance, Proprioception, and Posture for 00 minutes. The following activities were included:    Nelsy received hot pack for 00 minutes to low back.    Nelsy received cold pack for 00 minutes to low back.      Home Exercises Provided and Patient Education Provided     Education provided:   Pt edu on proper exercise technique.      Written Home Exercises Provided: Patient instructed to cont prior HEP.  Exercises were reviewed and Nelsy was able to demonstrate them prior to the end of the session.  Nelsy demonstrated good  understanding of the education provided.     See EMR under Patient Instructions for exercises provided prior visit.    Assessment     Provided updated HEP today as pt anticipating D/C at [place]. Overall progressing well and able to tolerated 15# deadlift today with LE fatigue, but no complaints of pain and good  hip hinge pattern. Encouraged continued posterior chain strengthening past D/C for bone health and functional strength.    Nelsy Is progressing well towards her goals.   Pt prognosis is Good.     Pt will continue to benefit from skilled outpatient physical therapy to address the deficits listed in the problem list box on initial evaluation, provide pt/family education and to maximize pt's level of independence in the home and community environment.     Pt's spiritual, cultural and educational needs considered and pt agreeable to plan of care and goals.    Anticipated barriers to physical therapy: none    Goals:     Plan     Continue with core strengthening, LE stretching and strengthening.    Amilcar Robbins, PT

## 2023-02-02 ENCOUNTER — CLINICAL SUPPORT (OUTPATIENT)
Dept: REHABILITATION | Facility: OTHER | Age: 78
End: 2023-02-02
Payer: MEDICARE

## 2023-02-02 DIAGNOSIS — R29.3 POSTURAL IMBALANCE: ICD-10-CM

## 2023-02-02 DIAGNOSIS — M62.81 MUSCLE WEAKNESS OF LOWER EXTREMITY: ICD-10-CM

## 2023-02-02 DIAGNOSIS — M54.41 ACUTE RIGHT-SIDED LOW BACK PAIN WITH RIGHT-SIDED SCIATICA: Primary | ICD-10-CM

## 2023-02-02 PROCEDURE — 97110 THERAPEUTIC EXERCISES: CPT | Mod: PN

## 2023-02-02 NOTE — PATIENT INSTRUCTIONS
"BRIDGING        While lying on your back, tighten your lower abdominals, squeeze your buttocks and then raise your buttocks off the floor/bed as creating a "Bridge" with your body. Hold and then lower yourself and repeat.     Perform 2 sets of 10 reps holding for 3-5 seconds.    Perform 2-3 times a way.    Copyright © 2023 [employer]      HIP ABDUCTION - SIDELYING            While lying on your side, slowly raise up your top leg to the side. Keep your knee straight and maintain your toes pointed forward the entire time. Keep your leg in-line with your body.  The bottom leg can be bent to stabilize your body.    Hold and then lower yourself and repeat.     Perform 2 sets of 10 reps holding for 3-5 seconds.    Perform 2-3 times a week.    Copyright © 2023 [employer]    LOWER TRUNK ROTATIONS - LTR - WIG WAGS - KNEE ROCKS        Lying on your back with your knees bent, gently rotate your spine as you move your knees to the side and then reverse directions and move your knees to the other side. Repeat as you move through a comfortable range of motion    Perform 20 to each side.    Copyright © 2023 [employer]    "

## 2023-02-03 DIAGNOSIS — Q61.3 PKD (POLYCYSTIC KIDNEY DISEASE): Primary | ICD-10-CM

## 2023-03-23 ENCOUNTER — OFFICE VISIT (OUTPATIENT)
Dept: INTERNAL MEDICINE | Facility: CLINIC | Age: 78
End: 2023-03-23
Attending: INTERNAL MEDICINE
Payer: MEDICARE

## 2023-03-23 VITALS
BODY MASS INDEX: 21.61 KG/M2 | DIASTOLIC BLOOD PRESSURE: 78 MMHG | OXYGEN SATURATION: 98 % | HEIGHT: 63 IN | SYSTOLIC BLOOD PRESSURE: 127 MMHG | HEART RATE: 63 BPM | WEIGHT: 121.94 LBS

## 2023-03-23 DIAGNOSIS — D64.9 ANEMIA, UNSPECIFIED TYPE: ICD-10-CM

## 2023-03-23 DIAGNOSIS — M81.0 OSTEOPOROSIS WITHOUT CURRENT PATHOLOGICAL FRACTURE, UNSPECIFIED OSTEOPOROSIS TYPE: ICD-10-CM

## 2023-03-23 DIAGNOSIS — Q61.3 PKD (POLYCYSTIC KIDNEY DISEASE): ICD-10-CM

## 2023-03-23 DIAGNOSIS — Z13.6 ENCOUNTER FOR LIPID SCREENING FOR CARDIOVASCULAR DISEASE: ICD-10-CM

## 2023-03-23 DIAGNOSIS — Z13.220 ENCOUNTER FOR LIPID SCREENING FOR CARDIOVASCULAR DISEASE: ICD-10-CM

## 2023-03-23 DIAGNOSIS — N18.32 STAGE 3B CHRONIC KIDNEY DISEASE: Primary | ICD-10-CM

## 2023-03-23 PROCEDURE — 1101F PT FALLS ASSESS-DOCD LE1/YR: CPT | Mod: CPTII,S$GLB,, | Performed by: INTERNAL MEDICINE

## 2023-03-23 PROCEDURE — 3288F FALL RISK ASSESSMENT DOCD: CPT | Mod: CPTII,S$GLB,, | Performed by: INTERNAL MEDICINE

## 2023-03-23 PROCEDURE — 1160F PR REVIEW ALL MEDS BY PRESCRIBER/CLIN PHARMACIST DOCUMENTED: ICD-10-PCS | Mod: CPTII,S$GLB,, | Performed by: INTERNAL MEDICINE

## 2023-03-23 PROCEDURE — 1126F PR PAIN SEVERITY QUANTIFIED, NO PAIN PRESENT: ICD-10-PCS | Mod: CPTII,S$GLB,, | Performed by: INTERNAL MEDICINE

## 2023-03-23 PROCEDURE — 3074F PR MOST RECENT SYSTOLIC BLOOD PRESSURE < 130 MM HG: ICD-10-PCS | Mod: CPTII,S$GLB,, | Performed by: INTERNAL MEDICINE

## 2023-03-23 PROCEDURE — 1101F PR PT FALLS ASSESS DOC 0-1 FALLS W/OUT INJ PAST YR: ICD-10-PCS | Mod: CPTII,S$GLB,, | Performed by: INTERNAL MEDICINE

## 2023-03-23 PROCEDURE — 3078F PR MOST RECENT DIASTOLIC BLOOD PRESSURE < 80 MM HG: ICD-10-PCS | Mod: CPTII,S$GLB,, | Performed by: INTERNAL MEDICINE

## 2023-03-23 PROCEDURE — 3288F PR FALLS RISK ASSESSMENT DOCUMENTED: ICD-10-PCS | Mod: CPTII,S$GLB,, | Performed by: INTERNAL MEDICINE

## 2023-03-23 PROCEDURE — 99999 PR PBB SHADOW E&M-EST. PATIENT-LVL III: ICD-10-PCS | Mod: PBBFAC,,, | Performed by: INTERNAL MEDICINE

## 2023-03-23 PROCEDURE — 3074F SYST BP LT 130 MM HG: CPT | Mod: CPTII,S$GLB,, | Performed by: INTERNAL MEDICINE

## 2023-03-23 PROCEDURE — 1160F RVW MEDS BY RX/DR IN RCRD: CPT | Mod: CPTII,S$GLB,, | Performed by: INTERNAL MEDICINE

## 2023-03-23 PROCEDURE — 1159F PR MEDICATION LIST DOCUMENTED IN MEDICAL RECORD: ICD-10-PCS | Mod: CPTII,S$GLB,, | Performed by: INTERNAL MEDICINE

## 2023-03-23 PROCEDURE — 3078F DIAST BP <80 MM HG: CPT | Mod: CPTII,S$GLB,, | Performed by: INTERNAL MEDICINE

## 2023-03-23 PROCEDURE — 99499 RISK ADDL DX/OHS AUDIT: ICD-10-PCS | Mod: S$GLB,,, | Performed by: INTERNAL MEDICINE

## 2023-03-23 PROCEDURE — 99215 PR OFFICE/OUTPT VISIT, EST, LEVL V, 40-54 MIN: ICD-10-PCS | Mod: S$GLB,,, | Performed by: INTERNAL MEDICINE

## 2023-03-23 PROCEDURE — 1126F AMNT PAIN NOTED NONE PRSNT: CPT | Mod: CPTII,S$GLB,, | Performed by: INTERNAL MEDICINE

## 2023-03-23 PROCEDURE — 99215 OFFICE O/P EST HI 40 MIN: CPT | Mod: S$GLB,,, | Performed by: INTERNAL MEDICINE

## 2023-03-23 PROCEDURE — 99499 UNLISTED E&M SERVICE: CPT | Mod: S$GLB,,, | Performed by: INTERNAL MEDICINE

## 2023-03-23 PROCEDURE — 1159F MED LIST DOCD IN RCRD: CPT | Mod: CPTII,S$GLB,, | Performed by: INTERNAL MEDICINE

## 2023-03-23 PROCEDURE — 99999 PR PBB SHADOW E&M-EST. PATIENT-LVL III: CPT | Mod: PBBFAC,,, | Performed by: INTERNAL MEDICINE

## 2023-03-23 NOTE — PROGRESS NOTES
Subjective:   Patient ID: Nelsy Mckenna is a 77 y.o. female  Chief complaint:   Chief Complaint   Patient presents with    Annual Exam       HPI  Here for follow up of osteoporosis and CKD3:  - reviewed recent labs through nephrology     Lower back pain:   tried melthocarb and had appt to be asessed and was better   Yoga for exercise     Vit d def, hyperparathyroidism:   Pth inc and vit d d and did resume daily vit d   Stay well hydrated     CKD, PKD:  - followed by nephrology - Dr. Clark   - on tolvaptan  Previously:    - had CT scan and had follow up in October 5, 2021 for SD on CKD3 - renal function improved on chart review and now stable   - inc to > 2 after ppi use and then improved as above   - CT showed PKD   - no longer on losartan 25mg but plan is to resume if bp increases    Mild anemia:   - no iron def at this time   - Seen by h/o 8/24/2021 for anemia   - cbcs stable     Vit d def:    Was taken off of all supplements due to sd - ok to resume   Previously:  Completed rx dose for 3 months but never started otc daily today - agrees to do so     Osteoporosis:  - reviewed recent DEXA and indications to start medication  - she wanted to hold off on medication   - discussed role of meds and pot f/u with endo to discuss all options - she will let me know if would like to do so in future   - Less exercise over past year   - Stopped all supplements when renal function recently worsened   Prev:   - discussed c/f boniva/alendronate due to fluctuating kidney labs over the past year and discussed prolia   - she will continue with exercise and supplements and consider prolia later in year after starting tolvaptin in early 2022 - we also discussed that we can consider endo appt as well in future   - Dexa due 2/2023    GERD:   - improved and asymptomatic at this time with reducing triggers   Prev:  - stopped red wine   - taking pepcid prn and stable   In Past:   - reports acid reflux at time to post throat  - dependent on  "intake of food triggers like red sauce and red wine - taking ranitidine prn for sx and effective when needed   Drinking 1 cup coffee in am   No cp, sob, nasuea, diaphoresis vomiting or diarrhea or abd pian, weight loss     HM:  Utd  Tdap due 7/2023    H/o: fermin - anemia  Renal: cardenas 2/3/2023  Ophthal: julianne   Opt: divina   Ortho: David    Review of Systems   Constitutional:  Negative for activity change and unexpected weight change.   HENT:  Negative for hearing loss, rhinorrhea and trouble swallowing.    Eyes:  Negative for discharge and visual disturbance.   Respiratory:  Negative for chest tightness and wheezing.    Cardiovascular:  Negative for chest pain and palpitations.   Gastrointestinal:  Negative for blood in stool, constipation, diarrhea and vomiting.   Endocrine: Negative for polydipsia and polyuria.   Genitourinary:  Negative for difficulty urinating, dysuria, hematuria and menstrual problem.   Musculoskeletal:  Negative for arthralgias, joint swelling and neck pain.   Neurological:  Negative for weakness and headaches.   Psychiatric/Behavioral:  Negative for confusion and dysphoric mood.      Objective:  Vitals:    03/23/23 1104 03/23/23 1215   BP: (!) 144/92 127/78   BP Location: Left arm    Patient Position: Sitting    Pulse: 63    SpO2: 98%    Weight: 55.3 kg (121 lb 14.6 oz)    Height: 5' 3" (1.6 m)      Body mass index is 21.6 kg/m².    Physical Exam  Vitals reviewed.   Constitutional:       Appearance: Normal appearance. She is well-developed.   HENT:      Head: Normocephalic and atraumatic.      Right Ear: Tympanic membrane, ear canal and external ear normal.      Left Ear: Tympanic membrane, ear canal and external ear normal.      Nose: Nose normal. No congestion.      Mouth/Throat:      Mouth: Mucous membranes are moist.      Pharynx: Oropharynx is clear. No oropharyngeal exudate.   Eyes:      Extraocular Movements: Extraocular movements intact.      Conjunctiva/sclera: Conjunctivae normal. "   Neck:      Thyroid: No thyromegaly.   Cardiovascular:      Rate and Rhythm: Normal rate and regular rhythm.      Pulses: Normal pulses.      Heart sounds: Normal heart sounds.   Pulmonary:      Effort: Pulmonary effort is normal.      Breath sounds: Normal breath sounds.   Abdominal:      General: Bowel sounds are normal.      Palpations: Abdomen is soft.   Musculoskeletal:         General: No swelling or tenderness.      Cervical back: Neck supple.   Lymphadenopathy:      Cervical: No cervical adenopathy.   Skin:     General: Skin is warm and dry.      Capillary Refill: Capillary refill takes less than 2 seconds.   Neurological:      General: No focal deficit present.      Mental Status: She is alert and oriented to person, place, and time. Mental status is at baseline.   Psychiatric:         Behavior: Behavior normal.         Thought Content: Thought content normal.     Assessment:  1. Stage 3b chronic kidney disease    2. PKD (polycystic kidney disease)    3. Osteoporosis without current pathological fracture, unspecified osteoporosis type    4. Anemia, unspecified type    5. Encounter for lipid screening for cardiovascular disease        Plan:   Nelsy was seen today for annual exam.    Diagnoses and all orders for this visit:    Stage 3b chronic kidney disease  -     TSH; Future  Stable   F/u with renal     PKD (polycystic kidney disease)  -     TSH; Future  Stable   Follow up with renal   Cont tolvaptan     Osteoporosis without current pathological fracture, unspecified osteoporosis type  -     DXA Bone Density Axial Skeleton 1 or more sites; Future  -     TSH; Future  Stable   Cont wicho and vit d   Dexa due later this year   Consider endo referral pending results and renal fucntion     Anemia, unspecified type  -     TSH; Future  -     CBC Auto Differential; Future  -     FERRITIN; Future  -     IRON AND TIBC; Future  Stable   Cont monitoring cbc     Encounter for lipid screening for cardiovascular disease  -      Lipid Panel; Future    40 min spent in care of patient including chart review, history, orders, physical, orders and coordination of care    Health Maintenance   Topic Date Due    TETANUS VACCINE  07/12/2023    DEXA Scan  02/05/2024    Lipid Panel  04/01/2026    Hepatitis C Screening  Completed

## 2023-03-26 PROBLEM — D64.9 ANEMIA: Status: ACTIVE | Noted: 2023-03-26

## 2023-03-26 PROBLEM — F10.21 ALCOHOLISM IN REMISSION: Status: RESOLVED | Noted: 2021-03-31 | Resolved: 2023-03-26

## 2023-03-29 ENCOUNTER — HOSPITAL ENCOUNTER (OUTPATIENT)
Dept: RADIOLOGY | Facility: OTHER | Age: 78
Discharge: HOME | End: 2023-03-29
Attending: INTERNAL MEDICINE
Payer: MEDICARE

## 2023-03-29 DIAGNOSIS — M81.0 OSTEOPOROSIS WITHOUT CURRENT PATHOLOGICAL FRACTURE, UNSPECIFIED OSTEOPOROSIS TYPE: ICD-10-CM

## 2023-03-29 PROCEDURE — 77080 DXA BONE DENSITY AXIAL: CPT | Mod: TC

## 2023-03-29 PROCEDURE — 77080 DXA BONE DENSITY AXIAL: CPT | Mod: 26,,, | Performed by: RADIOLOGY

## 2023-03-29 PROCEDURE — 77080 DXA BONE DENSITY AXIAL SKELETON 1 OR MORE SITES: ICD-10-PCS | Mod: 26,,, | Performed by: RADIOLOGY

## 2023-05-18 ENCOUNTER — IMMUNIZATION (OUTPATIENT)
Dept: INTERNAL MEDICINE | Facility: CLINIC | Age: 78
End: 2023-05-18
Payer: MEDICARE

## 2023-05-18 DIAGNOSIS — Z23 NEED FOR VACCINATION: Primary | ICD-10-CM

## 2023-05-18 PROCEDURE — 0124A COVID-19, MRNA, LNP-S, BIVALENT BOOSTER, PF, 30 MCG/0.3 ML DOSE: CPT | Mod: CV19,,, | Performed by: INTERNAL MEDICINE

## 2023-05-18 PROCEDURE — 91312 COVID-19, MRNA, LNP-S, BIVALENT BOOSTER, PF, 30 MCG/0.3 ML DOSE: CPT | Mod: ,,, | Performed by: INTERNAL MEDICINE

## 2023-05-18 PROCEDURE — 0124A COVID-19, MRNA, LNP-S, BIVALENT BOOSTER, PF, 30 MCG/0.3 ML DOSE: ICD-10-PCS | Mod: CV19,,, | Performed by: INTERNAL MEDICINE

## 2023-05-18 PROCEDURE — 91312 COVID-19, MRNA, LNP-S, BIVALENT BOOSTER, PF, 30 MCG/0.3 ML DOSE: ICD-10-PCS | Mod: ,,, | Performed by: INTERNAL MEDICINE

## 2023-06-06 ENCOUNTER — PATIENT MESSAGE (OUTPATIENT)
Dept: INTERNAL MEDICINE | Facility: CLINIC | Age: 78
End: 2023-06-06
Payer: MEDICARE

## 2023-06-06 DIAGNOSIS — Z12.31 ENCOUNTER FOR SCREENING MAMMOGRAM FOR BREAST CANCER: Primary | ICD-10-CM

## 2023-06-20 ENCOUNTER — HOSPITAL ENCOUNTER (OUTPATIENT)
Dept: RADIOLOGY | Facility: OTHER | Age: 78
Discharge: HOME | End: 2023-06-20
Attending: INTERNAL MEDICINE
Payer: MEDICARE

## 2023-06-20 DIAGNOSIS — Z12.31 ENCOUNTER FOR SCREENING MAMMOGRAM FOR BREAST CANCER: ICD-10-CM

## 2023-06-20 PROCEDURE — 77063 BREAST TOMOSYNTHESIS BI: CPT | Mod: 26,,, | Performed by: RADIOLOGY

## 2023-06-20 PROCEDURE — 77067 MAMMO DIGITAL SCREENING BILAT WITH TOMO: ICD-10-PCS | Mod: 26,,, | Performed by: RADIOLOGY

## 2023-06-20 PROCEDURE — 77063 MAMMO DIGITAL SCREENING BILAT WITH TOMO: ICD-10-PCS | Mod: 26,,, | Performed by: RADIOLOGY

## 2023-06-20 PROCEDURE — 77067 SCR MAMMO BI INCL CAD: CPT | Mod: TC

## 2023-06-20 PROCEDURE — 77067 SCR MAMMO BI INCL CAD: CPT | Mod: 26,,, | Performed by: RADIOLOGY

## 2023-08-16 ENCOUNTER — PATIENT MESSAGE (OUTPATIENT)
Dept: INTERNAL MEDICINE | Facility: CLINIC | Age: 78
End: 2023-08-16
Payer: MEDICARE

## 2023-08-16 NOTE — TELEPHONE ENCOUNTER
Reviewed labs from her renal specialist   She is up to date on annual labs 3/2023    Currently she is not due for any labs at this time from my standpoint - please thank her for me for checking in

## 2023-08-17 DIAGNOSIS — Q61.3 PKD (POLYCYSTIC KIDNEY DISEASE): Primary | ICD-10-CM

## 2023-08-22 ENCOUNTER — OFFICE VISIT (OUTPATIENT)
Dept: INTERNAL MEDICINE | Facility: CLINIC | Age: 78
End: 2023-08-22
Attending: INTERNAL MEDICINE
Payer: MEDICARE

## 2023-08-22 VITALS
BODY MASS INDEX: 21.64 KG/M2 | DIASTOLIC BLOOD PRESSURE: 82 MMHG | HEART RATE: 72 BPM | WEIGHT: 122.13 LBS | SYSTOLIC BLOOD PRESSURE: 132 MMHG | OXYGEN SATURATION: 97 % | HEIGHT: 63 IN

## 2023-08-22 DIAGNOSIS — Q61.3 PKD (POLYCYSTIC KIDNEY DISEASE): ICD-10-CM

## 2023-08-22 DIAGNOSIS — E55.9 VITAMIN D DEFICIENCY: ICD-10-CM

## 2023-08-22 DIAGNOSIS — N18.4 CKD (CHRONIC KIDNEY DISEASE) STAGE 4, GFR 15-29 ML/MIN: ICD-10-CM

## 2023-08-22 DIAGNOSIS — E21.3 HYPERPARATHYROIDISM: ICD-10-CM

## 2023-08-22 DIAGNOSIS — K21.9 GERD WITHOUT ESOPHAGITIS: ICD-10-CM

## 2023-08-22 DIAGNOSIS — F41.9 ANXIETY: Primary | ICD-10-CM

## 2023-08-22 DIAGNOSIS — M81.0 AGE RELATED OSTEOPOROSIS, UNSPECIFIED PATHOLOGICAL FRACTURE PRESENCE: ICD-10-CM

## 2023-08-22 PROBLEM — D64.9 ANEMIA: Status: RESOLVED | Noted: 2023-03-26 | Resolved: 2023-08-22

## 2023-08-22 PROCEDURE — 99215 PR OFFICE/OUTPT VISIT, EST, LEVL V, 40-54 MIN: ICD-10-PCS | Mod: S$GLB,,, | Performed by: INTERNAL MEDICINE

## 2023-08-22 PROCEDURE — 1160F RVW MEDS BY RX/DR IN RCRD: CPT | Mod: CPTII,S$GLB,, | Performed by: INTERNAL MEDICINE

## 2023-08-22 PROCEDURE — 3075F SYST BP GE 130 - 139MM HG: CPT | Mod: CPTII,S$GLB,, | Performed by: INTERNAL MEDICINE

## 2023-08-22 PROCEDURE — 3079F DIAST BP 80-89 MM HG: CPT | Mod: CPTII,S$GLB,, | Performed by: INTERNAL MEDICINE

## 2023-08-22 PROCEDURE — 99999 PR PBB SHADOW E&M-EST. PATIENT-LVL III: CPT | Mod: PBBFAC,,, | Performed by: INTERNAL MEDICINE

## 2023-08-22 PROCEDURE — 1126F AMNT PAIN NOTED NONE PRSNT: CPT | Mod: CPTII,S$GLB,, | Performed by: INTERNAL MEDICINE

## 2023-08-22 PROCEDURE — 1160F PR REVIEW ALL MEDS BY PRESCRIBER/CLIN PHARMACIST DOCUMENTED: ICD-10-PCS | Mod: CPTII,S$GLB,, | Performed by: INTERNAL MEDICINE

## 2023-08-22 PROCEDURE — 3288F FALL RISK ASSESSMENT DOCD: CPT | Mod: CPTII,S$GLB,, | Performed by: INTERNAL MEDICINE

## 2023-08-22 PROCEDURE — 3075F PR MOST RECENT SYSTOLIC BLOOD PRESS GE 130-139MM HG: ICD-10-PCS | Mod: CPTII,S$GLB,, | Performed by: INTERNAL MEDICINE

## 2023-08-22 PROCEDURE — 1101F PR PT FALLS ASSESS DOC 0-1 FALLS W/OUT INJ PAST YR: ICD-10-PCS | Mod: CPTII,S$GLB,, | Performed by: INTERNAL MEDICINE

## 2023-08-22 PROCEDURE — 1126F PR PAIN SEVERITY QUANTIFIED, NO PAIN PRESENT: ICD-10-PCS | Mod: CPTII,S$GLB,, | Performed by: INTERNAL MEDICINE

## 2023-08-22 PROCEDURE — 1101F PT FALLS ASSESS-DOCD LE1/YR: CPT | Mod: CPTII,S$GLB,, | Performed by: INTERNAL MEDICINE

## 2023-08-22 PROCEDURE — 1159F PR MEDICATION LIST DOCUMENTED IN MEDICAL RECORD: ICD-10-PCS | Mod: CPTII,S$GLB,, | Performed by: INTERNAL MEDICINE

## 2023-08-22 PROCEDURE — 1159F MED LIST DOCD IN RCRD: CPT | Mod: CPTII,S$GLB,, | Performed by: INTERNAL MEDICINE

## 2023-08-22 PROCEDURE — 3079F PR MOST RECENT DIASTOLIC BLOOD PRESSURE 80-89 MM HG: ICD-10-PCS | Mod: CPTII,S$GLB,, | Performed by: INTERNAL MEDICINE

## 2023-08-22 PROCEDURE — 99215 OFFICE O/P EST HI 40 MIN: CPT | Mod: S$GLB,,, | Performed by: INTERNAL MEDICINE

## 2023-08-22 PROCEDURE — 3288F PR FALLS RISK ASSESSMENT DOCUMENTED: ICD-10-PCS | Mod: CPTII,S$GLB,, | Performed by: INTERNAL MEDICINE

## 2023-08-22 PROCEDURE — 99999 PR PBB SHADOW E&M-EST. PATIENT-LVL III: ICD-10-PCS | Mod: PBBFAC,,, | Performed by: INTERNAL MEDICINE

## 2023-08-22 RX ORDER — ALPRAZOLAM 0.25 MG/1
0.25 TABLET ORAL 2 TIMES DAILY PRN
Qty: 20 TABLET | Refills: 0 | Status: SHIPPED | OUTPATIENT
Start: 2023-08-22 | End: 2024-02-23

## 2023-08-22 RX ORDER — LOSARTAN POTASSIUM 25 MG/1
12.5 TABLET ORAL
COMMUNITY
Start: 2023-08-15 | End: 2024-08-14

## 2023-08-22 RX ORDER — DENOSUMAB 60 MG/ML
INJECTION SUBCUTANEOUS
COMMUNITY
Start: 2023-08-15

## 2023-08-22 NOTE — PROGRESS NOTES
Subjective:   Patient ID: Nelsy Mckenna is a 78 y.o. female  Chief complaint:   Chief Complaint   Patient presents with    Annual Exam       HPI  Here for follow up of osteoporosis and CKD3:  - reviewed recent labs through nephrology     Going on cruise - ian to [place]    Osteoporosis:  - had first prolia injection yesterday after discussing with renal yesterday - tolerated well after renal ordered   - more consistent with vit d supplement   - eats yogurt - discussed adding calcium suppl  - exercising - yoga 2x/week, strength training 1x/wk - cardio when weather better  Previously:   - reviewed recent DEXA and indications to start medication  - she wanted to hold off on medication   - discussed role of meds and pot f/u with endo to discuss all options - she will let me know if would like to do so in future   - Less exercise over past year   - Stopped all supplements when renal function recently worsened   Prev:   - discussed c/f boniva/alendronate due to fluctuating kidney labs over the past year and discussed prolia   - she will continue with exercise and supplements and consider prolia later in year after starting tolvaptin in early 2022 - we also discussed that we can consider endo appt as well in future   - Dexa 2/2023    CKD4, PKD:  - followed by nephrology - Dr. Clark   - on tolvaptan  Previously:    - had CT scan and had follow up in October 5, 2021 for WILLY on CKD3 - renal function improved on chart review and now stable   - inc to > 2 after ppi use and then improved as above   - CT showed PKD   - no longer on losartan 25mg but plan is to resume if bp increases    Lower back pain:   tried melthocarb and had appt to be asessed and was better   Yoga for exercise     Vit d def, hyperparathyroidism:   Pth inc and vit d d and did resume daily vit d   Stay well hydrated     Mild anemia:   Resolved on recent labs 8/10/2023  Prev:   - no iron def at this time   - Seen by h/o 8/24/2021 for anemia   - cbcs stable     Vit  "d def:    - now back on vit d and now consistent   Prev:   Was taken off of all supplements due to sd - ok to resume   Previously:  Completed rx dose for 3 months but never started otc daily today - agrees to do so     GERD:   - improved and asymptomatic at this time with reducing triggers   Prev:  - stopped red wine   - taking pepcid prn and stable   In Past:   - reports acid reflux at time to post throat  - dependent on intake of food triggers like red sauce and red wine - taking ranitidine prn for sx and effective when needed   Drinking 1 cup coffee in am   No cp, sob, nasuea, diaphoresis vomiting or diarrhea or abd pian, weight loss     HM:  Covid booster   Flu vaccine  Tdap due 7/2023    H/o: fermin - anemia  Renal: cardenas 2/3/2023  Ophthal: julianne   Opt: Rebeka   Ortho: David    Review of Systems   Constitutional:  Negative for activity change and unexpected weight change.   HENT:  Negative for hearing loss, rhinorrhea and trouble swallowing.    Eyes:  Negative for discharge and visual disturbance.   Respiratory:  Negative for chest tightness and wheezing.    Cardiovascular:  Negative for chest pain and palpitations.   Gastrointestinal:  Negative for blood in stool, constipation, diarrhea and vomiting.   Endocrine: Negative for polydipsia and polyuria.   Genitourinary:  Negative for difficulty urinating, dysuria, hematuria and menstrual problem.   Musculoskeletal:  Negative for arthralgias, joint swelling and neck pain.   Neurological:  Negative for weakness and headaches.   Psychiatric/Behavioral:  Negative for confusion and dysphoric mood.        Objective:  Vitals:    08/22/23 1112   BP: 132/82   BP Location: Left arm   Patient Position: Sitting   Pulse: 72   SpO2: 97%   Weight: 55.4 kg (122 lb 2.2 oz)   Height: 5' 3" (1.6 m)     Body mass index is 21.64 kg/m².    Physical Exam  Vitals reviewed.   Constitutional:       Appearance: Normal appearance. She is well-developed.   HENT:      Head: Normocephalic and " atraumatic.      Right Ear: Tympanic membrane, ear canal and external ear normal.      Left Ear: Tympanic membrane, ear canal and external ear normal.      Nose: Nose normal. No congestion.      Mouth/Throat:      Mouth: Mucous membranes are moist.      Pharynx: Oropharynx is clear. No oropharyngeal exudate.   Eyes:      Extraocular Movements: Extraocular movements intact.      Conjunctiva/sclera: Conjunctivae normal.   Neck:      Thyroid: No thyromegaly.   Cardiovascular:      Rate and Rhythm: Normal rate and regular rhythm.      Pulses: Normal pulses.      Heart sounds: Normal heart sounds.   Pulmonary:      Effort: Pulmonary effort is normal.      Breath sounds: Normal breath sounds.   Abdominal:      General: Bowel sounds are normal.      Palpations: Abdomen is soft.   Musculoskeletal:         General: No swelling or tenderness.      Cervical back: Neck supple.   Lymphadenopathy:      Cervical: No cervical adenopathy.   Skin:     General: Skin is warm and dry.      Capillary Refill: Capillary refill takes less than 2 seconds.   Neurological:      General: No focal deficit present.      Mental Status: She is alert and oriented to person, place, and time. Mental status is at baseline.   Psychiatric:         Behavior: Behavior normal.         Thought Content: Thought content normal.       Assessment:  1. Anxiety    2. CKD (chronic kidney disease) stage 4, GFR 15-29 ml/min    3. GERD without esophagitis    4. Vitamin D deficiency    5. Age related osteoporosis, unspecified pathological fracture presence    6. PKD (polycystic kidney disease)    7. Hyperparathyroidism          Plan:  Nelsy was seen today for annual exam.    Diagnoses and all orders for this visit:    Anxiety  -     ALPRAZolam (XANAX) 0.25 MG tablet; Take 1 tablet (0.25 mg total) by mouth 2 (two) times daily as needed for Anxiety.  Stable    reviewed and consistent     CKD (chronic kidney disease) stage 4, GFR 15-29 ml/min  Stable   F/u with renal    Schedule CT as planned   Cont currnet med     GERD without esophagitis  Stable   Cont diet restrictions    Vitamin D deficiency  Stable   Cont suppl     Age related osteoporosis, unspecified pathological fracture presence  Cont prolia q6m and vit d   Start calcium 600 bid     PKD (polycystic kidney disease)  Stable   As above - f/u iwht renal     Hyperparathyroidism  Stable   Cont vit d      40 min spent in care of patient including chart review, history, orders, physical, orders and coordination of care    Health Maintenance   Topic Date Due    TETANUS VACCINE  07/12/2023    DEXA Scan  03/29/2026    Lipid Panel  03/29/2028    Hepatitis C Screening  Completed    Shingles Vaccine  Completed

## 2023-08-30 ENCOUNTER — HOSPITAL ENCOUNTER (OUTPATIENT)
Dept: RADIOLOGY | Facility: OTHER | Age: 78
Discharge: HOME | End: 2023-08-30
Attending: NURSE PRACTITIONER
Payer: MEDICARE

## 2023-08-30 DIAGNOSIS — Q61.3 PKD (POLYCYSTIC KIDNEY DISEASE): ICD-10-CM

## 2023-08-30 PROCEDURE — 74176 CT ABDOMEN PELVIS WITHOUT CONTRAST: ICD-10-PCS | Mod: 26,,, | Performed by: RADIOLOGY

## 2023-08-30 PROCEDURE — 74176 CT ABD & PELVIS W/O CONTRAST: CPT | Mod: 26,,, | Performed by: RADIOLOGY

## 2023-08-30 PROCEDURE — 74176 CT ABD & PELVIS W/O CONTRAST: CPT | Mod: TC

## 2024-02-23 ENCOUNTER — TELEPHONE (OUTPATIENT)
Dept: RADIOLOGY | Facility: HOSPITAL | Age: 79
End: 2024-02-23
Payer: MEDICARE

## 2024-02-23 ENCOUNTER — OFFICE VISIT (OUTPATIENT)
Dept: INTERNAL MEDICINE | Facility: CLINIC | Age: 79
End: 2024-02-23
Attending: INTERNAL MEDICINE
Payer: MEDICARE

## 2024-02-23 DIAGNOSIS — R80.8 OTHER PROTEINURIA: ICD-10-CM

## 2024-02-23 DIAGNOSIS — Q61.3 PKD (POLYCYSTIC KIDNEY DISEASE): ICD-10-CM

## 2024-02-23 DIAGNOSIS — E53.8 FOLIC ACID DEFICIENCY: ICD-10-CM

## 2024-02-23 DIAGNOSIS — N64.4 BREAST PAIN, RIGHT: ICD-10-CM

## 2024-02-23 DIAGNOSIS — N18.32 STAGE 3B CHRONIC KIDNEY DISEASE: ICD-10-CM

## 2024-02-23 DIAGNOSIS — E55.9 VITAMIN D DEFICIENCY: ICD-10-CM

## 2024-02-23 DIAGNOSIS — N81.10 VAGINAL PROLAPSE: Primary | ICD-10-CM

## 2024-02-23 DIAGNOSIS — M81.8 OTHER OSTEOPOROSIS, UNSPECIFIED PATHOLOGICAL FRACTURE PRESENCE: ICD-10-CM

## 2024-02-23 PROCEDURE — 99999 PR PBB SHADOW E&M-EST. PATIENT-LVL IV: CPT | Mod: PBBFAC,,, | Performed by: INTERNAL MEDICINE

## 2024-02-23 PROCEDURE — 99215 OFFICE O/P EST HI 40 MIN: CPT | Mod: S$GLB,,, | Performed by: INTERNAL MEDICINE

## 2024-02-23 RX ORDER — FOLIC ACID 0.4 MG
400 TABLET ORAL DAILY
Qty: 90 TABLET | Refills: 3 | Status: SHIPPED | OUTPATIENT
Start: 2024-02-23 | End: 2024-04-12

## 2024-02-23 NOTE — TELEPHONE ENCOUNTER
----- Message from Yina Hernandez MA sent at 2/23/2024  2:13 PM CST -----  Hi!    Can you please assist with scheduling breast appt's?

## 2024-02-23 NOTE — PROGRESS NOTES
Normal more Subjective:   Patient ID: Nelsy Mckenna is a 78 y.o. female  Chief complaint:   Chief Complaint   Patient presents with    Hypertension     F/u       HPI  Here for follow up of osteoporosis and CKD3:  - reviewed recent labs through nephrology     Ckd stable   Followed by renal   1/2 dose losartan   Does not have bp cuff at home     When breaths deeply will get discomfort at right breast   Often not bothersome   Feels like a discomfort  No skin chagnes   No sob or wheezing   No shoulder pain or back pain   Mmg utd 6/2023   Not triggered by exercise   No tachycardia    Osteoporosis:  - nisha prolia injection   - tolerated well after renal ordered   - more consistent with vit d supplement   - eats yogurt - discussed adding calcium suppl  - exercising - yoga 2x/week, strength training 1x/wk - cardio when weather better  Previously:   - reviewed recent DEXA and indications to start medication  - she wanted to hold off on medication   - discussed role of meds and pot f/u with endo to discuss all options - she will let me know if would like to do so in future   - Less exercise over past year   - Stopped all supplements when renal function recently worsened   Prev:   - discussed c/f boniva/alendronate due to fluctuating kidney labs over the past year and discussed prolia   - she will continue with exercise and supplements and consider prolia later in year after starting tolvaptin in early 2022 - we also discussed that we can consider endo appt as well in future   - Dexa 2/2023    CKD4, PKD:  - as above  - followed by nephrology - Dr. Clark   - on tolvaptan  Previously:    - had CT scan and had follow up in October 5, 2021 for WILLY on CKD3 - renal function improved on chart review and now stable   - inc to > 2 after ppi use and then improved as above   - CT showed PKD   - no longer on losartan 25mg but plan is to resume if bp increases    Lower back pain:   tried melthocarb and had appt to be asessed and was better   Yoga  for exercise     Vit d def, hyperparathyroidism:   Pth inc and vit d d and did resume daily vit d   Stay well hydrated     Mild anemia:   Resolved on recent labs 8/10/2023  Prev:   - no iron def at this time   - Seen by h/o 8/24/2021 for anemia   - cbcs stable     Vit d def:    - vit d   Prev:   Was taken off of all supplements due to sd - ok to resume   Previously:  Completed rx dose for 3 months but never started otc daily today - agrees to do so     GERD:   - improved and asymptomatic at this time with reducing triggers   Prev:  - stopped red wine   - taking pepcid prn and stable   In Past:   - reports acid reflux at time to post throat  - dependent on intake of food triggers like red sauce and red wine - taking ranitidine prn for sx and effective when needed   Drinking 1 cup coffee in am   No cp, sob, nasuea, diaphoresis vomiting or diarrhea or abd pian, weight loss     HM:  Tdap due 7/2023  Covid     H/o: fermin - anemia  Renal: theresa 2/3/2023  Ophthal: julianne   Opt: Rebeka   Ortho: David    Review of Systems   Constitutional:  Negative for activity change and unexpected weight change.   HENT:  Negative for hearing loss, rhinorrhea and trouble swallowing.    Eyes:  Negative for discharge and visual disturbance.   Respiratory:  Negative for chest tightness and wheezing.    Cardiovascular:  Negative for chest pain and palpitations.   Gastrointestinal:  Negative for blood in stool, constipation, diarrhea and vomiting.   Endocrine: Negative for polydipsia and polyuria.   Genitourinary:  Negative for difficulty urinating, dysuria, hematuria and menstrual problem.   Musculoskeletal:  Negative for arthralgias, joint swelling and neck pain.   Neurological:  Negative for weakness and headaches.   Psychiatric/Behavioral:  Negative for confusion and dysphoric mood.      128/71    Objective:  Vitals:    02/23/24 1255 02/23/24 1330   BP: (!) 142/86 128/71   BP Location: Left arm    Patient Position: Sitting    Pulse: 75    SpO2:  "96%    Weight: 54.2 kg (119 lb 7.8 oz)    Height: 5' 3" (1.6 m)      Body mass index is 21.17 kg/m².    Physical Exam  Vitals reviewed.   Constitutional:       Appearance: Normal appearance. She is well-developed.   HENT:      Head: Normocephalic and atraumatic.      Right Ear: Tympanic membrane, ear canal and external ear normal.      Left Ear: Tympanic membrane, ear canal and external ear normal.      Nose: Nose normal. No congestion.      Mouth/Throat:      Mouth: Mucous membranes are moist.      Pharynx: Oropharynx is clear. No oropharyngeal exudate.   Eyes:      Extraocular Movements: Extraocular movements intact.      Conjunctiva/sclera: Conjunctivae normal.   Neck:      Thyroid: No thyromegaly.   Cardiovascular:      Rate and Rhythm: Normal rate and regular rhythm.      Pulses: Normal pulses.      Heart sounds: Normal heart sounds.   Pulmonary:      Effort: Pulmonary effort is normal.      Breath sounds: Normal breath sounds.   Chest:   Breasts:     Right: No swelling, bleeding, inverted nipple, mass, nipple discharge, skin change or tenderness.      Left: Tenderness present. No swelling, bleeding, inverted nipple, mass, nipple discharge or skin change.       Abdominal:      General: Bowel sounds are normal.      Palpations: Abdomen is soft.   Musculoskeletal:         General: No swelling or tenderness.      Cervical back: Neck supple.   Lymphadenopathy:      Cervical: No cervical adenopathy.   Skin:     General: Skin is warm and dry.      Capillary Refill: Capillary refill takes less than 2 seconds.   Neurological:      General: No focal deficit present.      Mental Status: She is alert and oriented to person, place, and time. Mental status is at baseline.   Psychiatric:         Behavior: Behavior normal.         Thought Content: Thought content normal.       Assessment:  1. Vaginal prolapse    2. Breast pain, right    3. Folic acid deficiency    4. Stage 3b chronic kidney disease    5. Other osteoporosis, " unspecified pathological fracture presence    6. PKD (polycystic kidney disease)    7. Other proteinuria    8. Vitamin D deficiency          Plan:  Nelsy was seen today for hypertension.    Diagnoses and all orders for this visit:    Vaginal prolapse  -     Ambulatory referral/consult to Urogynecology; Future    Breast pain, right  -     Mammo Digital Diagnostic Right with [facility]; Future  -     US Breast Right Limited; Future    Folic acid deficiency  -     folic acid (FOLVITE) 400 MCG tablet; Take 1 tablet (400 mcg total) by mouth once daily.    Stage 3b chronic kidney disease  -     TSH; Future  -     Lipid Panel; Future  -     TSH  -     Lipid Panel  Stable   F/u with renal     Other osteoporosis, unspecified pathological fracture presence  -     TSH; Future  -     Lipid Panel; Future  -     TSH  -     Lipid Panel  Stable   Cont prolia   Dexa q2y   Weight bearing exercise     PKD (polycystic kidney disease)  -     TSH; Future  -     Lipid Panel; Future  -     TSH  -     Lipid Panel  Stable   F/u with renal   Cont current regimen     Other proteinuria  -     TSH; Future  -     Lipid Panel; Future  -     TSH  -     Lipid Panel    Vitamin D deficiency  Stable   Cont suppl     40 min spent in care of patient including chart review, history, orders, physical, orders and coordination of care    Health Maintenance   Topic Date Due    TETANUS VACCINE  07/12/2023    DEXA Scan  03/29/2026    Lipid Panel  03/29/2028    Hepatitis C Screening  Completed    Shingles Vaccine  Completed

## 2024-02-27 ENCOUNTER — HOSPITAL ENCOUNTER (OUTPATIENT)
Dept: RADIOLOGY | Facility: HOSPITAL | Age: 79
Discharge: HOME | End: 2024-02-27
Attending: INTERNAL MEDICINE
Payer: MEDICARE

## 2024-02-27 VITALS — BODY MASS INDEX: 21.26 KG/M2 | HEIGHT: 63 IN | WEIGHT: 120 LBS

## 2024-02-27 DIAGNOSIS — N81.10 VAGINAL PROLAPSE: ICD-10-CM

## 2024-02-27 DIAGNOSIS — N64.4 BREAST PAIN, RIGHT: ICD-10-CM

## 2024-02-27 PROCEDURE — 77065 DX MAMMO INCL CAD UNI: CPT | Mod: TC,RT

## 2024-02-27 PROCEDURE — 77065 DX MAMMO INCL CAD UNI: CPT | Mod: 26,RT,, | Performed by: RADIOLOGY

## 2024-02-27 PROCEDURE — 76642 ULTRASOUND BREAST LIMITED: CPT | Mod: TC,RT

## 2024-02-27 PROCEDURE — 77061 BREAST TOMOSYNTHESIS UNI: CPT | Mod: TC,RT

## 2024-02-27 PROCEDURE — 77061 BREAST TOMOSYNTHESIS UNI: CPT | Mod: 26,RT,, | Performed by: RADIOLOGY

## 2024-02-27 PROCEDURE — 76642 ULTRASOUND BREAST LIMITED: CPT | Mod: 26,RT,, | Performed by: RADIOLOGY

## 2024-03-13 ENCOUNTER — TELEPHONE (OUTPATIENT)
Dept: INTERNAL MEDICINE | Facility: CLINIC | Age: 79
End: 2024-03-13
Payer: MEDICARE

## 2024-03-13 DIAGNOSIS — N81.10 VAGINAL PROLAPSE: Primary | ICD-10-CM

## 2024-03-13 NOTE — TELEPHONE ENCOUNTER
Audie Tonja  MIT[facility] Randall [facility] Staff  Good morning,    Dr. Randall did a referral for patient for breast pain.  We do not see patients for breast pain.  Can Dr. Randall update referral for UROGYN.  Thanks!

## 2024-03-18 VITALS
BODY MASS INDEX: 21.17 KG/M2 | SYSTOLIC BLOOD PRESSURE: 128 MMHG | DIASTOLIC BLOOD PRESSURE: 71 MMHG | WEIGHT: 119.5 LBS | OXYGEN SATURATION: 96 % | HEART RATE: 75 BPM | HEIGHT: 63 IN

## 2024-04-12 ENCOUNTER — OFFICE VISIT (OUTPATIENT)
Dept: UROGYNECOLOGY | Facility: CLINIC | Age: 79
End: 2024-04-12
Attending: INTERNAL MEDICINE
Payer: MEDICARE

## 2024-04-12 ENCOUNTER — OFFICE VISIT (OUTPATIENT)
Dept: INTERNAL MEDICINE | Facility: CLINIC | Age: 79
End: 2024-04-12
Attending: INTERNAL MEDICINE
Payer: MEDICARE

## 2024-04-12 VITALS
OXYGEN SATURATION: 98 % | SYSTOLIC BLOOD PRESSURE: 128 MMHG | BODY MASS INDEX: 20.98 KG/M2 | HEART RATE: 71 BPM | WEIGHT: 118.38 LBS | DIASTOLIC BLOOD PRESSURE: 70 MMHG | HEIGHT: 63 IN

## 2024-04-12 VITALS — SYSTOLIC BLOOD PRESSURE: 162 MMHG | BODY MASS INDEX: 21.17 KG/M2 | DIASTOLIC BLOOD PRESSURE: 73 MMHG | WEIGHT: 119.5 LBS

## 2024-04-12 DIAGNOSIS — R80.8 OTHER PROTEINURIA: ICD-10-CM

## 2024-04-12 DIAGNOSIS — K21.9 GERD WITHOUT ESOPHAGITIS: ICD-10-CM

## 2024-04-12 DIAGNOSIS — N18.4 CKD (CHRONIC KIDNEY DISEASE) STAGE 4, GFR 15-29 ML/MIN: ICD-10-CM

## 2024-04-12 DIAGNOSIS — N95.2 VAGINAL ATROPHY: ICD-10-CM

## 2024-04-12 DIAGNOSIS — F41.9 ANXIETY: ICD-10-CM

## 2024-04-12 DIAGNOSIS — E21.3 HYPERPARATHYROIDISM: ICD-10-CM

## 2024-04-12 DIAGNOSIS — N39.41 URGE INCONTINENCE OF URINE: ICD-10-CM

## 2024-04-12 DIAGNOSIS — R39.15 URINARY URGENCY: ICD-10-CM

## 2024-04-12 DIAGNOSIS — Q61.3 PKD (POLYCYSTIC KIDNEY DISEASE): ICD-10-CM

## 2024-04-12 DIAGNOSIS — Z01.818 PRE-OPERATIVE CLEARANCE: Primary | ICD-10-CM

## 2024-04-12 DIAGNOSIS — R35.0 URINARY FREQUENCY: ICD-10-CM

## 2024-04-12 DIAGNOSIS — N81.2 INCOMPLETE UTEROVAGINAL PROLAPSE: Primary | ICD-10-CM

## 2024-04-12 DIAGNOSIS — E55.9 VITAMIN D DEFICIENCY: ICD-10-CM

## 2024-04-12 DIAGNOSIS — M81.0 OSTEOPOROSIS WITHOUT CURRENT PATHOLOGICAL FRACTURE, UNSPECIFIED OSTEOPOROSIS TYPE: ICD-10-CM

## 2024-04-12 LAB
BACTERIA #/AREA URNS AUTO: NORMAL /HPF
BILIRUB SERPL-MCNC: NEGATIVE MG/DL
BLOOD URINE, POC: NEGATIVE
CLARITY, POC UA: CLEAR
COLOR, POC UA: YELLOW
GLUCOSE UR QL STRIP: NORMAL
KETONES UR QL STRIP: NEGATIVE
LEUKOCYTE ESTERASE URINE, POC: NORMAL
MICROSCOPIC COMMENT: NORMAL
NITRITE, POC UA: NEGATIVE
PH, POC UA: 7
POC RESIDUAL URINE VOLUME: 47 ML (ref 0–100)
PROTEIN, POC: NEGATIVE
RBC #/AREA URNS AUTO: 1 /HPF (ref 0–4)
SPECIFIC GRAVITY, POC UA: 1
SQUAMOUS #/AREA URNS AUTO: 1 /HPF
UROBILINOGEN, POC UA: NEGATIVE
WBC #/AREA URNS AUTO: 5 /HPF (ref 0–5)

## 2024-04-12 PROCEDURE — 3077F SYST BP >= 140 MM HG: CPT | Mod: CPTII,S$GLB,, | Performed by: OBSTETRICS & GYNECOLOGY

## 2024-04-12 PROCEDURE — 99999 PR PBB SHADOW E&M-EST. PATIENT-LVL III: CPT | Mod: PBBFAC,,, | Performed by: INTERNAL MEDICINE

## 2024-04-12 PROCEDURE — 1101F PT FALLS ASSESS-DOCD LE1/YR: CPT | Mod: CPTII,S$GLB,, | Performed by: INTERNAL MEDICINE

## 2024-04-12 PROCEDURE — 99214 OFFICE O/P EST MOD 30 MIN: CPT | Mod: S$GLB,,, | Performed by: INTERNAL MEDICINE

## 2024-04-12 PROCEDURE — 99204 OFFICE O/P NEW MOD 45 MIN: CPT | Mod: S$GLB,,, | Performed by: OBSTETRICS & GYNECOLOGY

## 2024-04-12 PROCEDURE — 99999 PR PBB SHADOW E&M-EST. PATIENT-LVL IV: CPT | Mod: PBBFAC,,, | Performed by: OBSTETRICS & GYNECOLOGY

## 2024-04-12 PROCEDURE — 3078F DIAST BP <80 MM HG: CPT | Mod: CPTII,S$GLB,, | Performed by: INTERNAL MEDICINE

## 2024-04-12 PROCEDURE — 87086 URINE CULTURE/COLONY COUNT: CPT | Performed by: OBSTETRICS & GYNECOLOGY

## 2024-04-12 PROCEDURE — 51798 US URINE CAPACITY MEASURE: CPT | Mod: S$GLB,,, | Performed by: OBSTETRICS & GYNECOLOGY

## 2024-04-12 PROCEDURE — 81002 URINALYSIS NONAUTO W/O SCOPE: CPT | Mod: S$GLB,,, | Performed by: OBSTETRICS & GYNECOLOGY

## 2024-04-12 PROCEDURE — 1126F AMNT PAIN NOTED NONE PRSNT: CPT | Mod: CPTII,S$GLB,, | Performed by: INTERNAL MEDICINE

## 2024-04-12 PROCEDURE — 1159F MED LIST DOCD IN RCRD: CPT | Mod: CPTII,S$GLB,, | Performed by: OBSTETRICS & GYNECOLOGY

## 2024-04-12 PROCEDURE — 81001 URINALYSIS AUTO W/SCOPE: CPT | Performed by: OBSTETRICS & GYNECOLOGY

## 2024-04-12 PROCEDURE — 3288F FALL RISK ASSESSMENT DOCD: CPT | Mod: CPTII,S$GLB,, | Performed by: INTERNAL MEDICINE

## 2024-04-12 PROCEDURE — 1159F MED LIST DOCD IN RCRD: CPT | Mod: CPTII,S$GLB,, | Performed by: INTERNAL MEDICINE

## 2024-04-12 PROCEDURE — 3074F SYST BP LT 130 MM HG: CPT | Mod: CPTII,S$GLB,, | Performed by: INTERNAL MEDICINE

## 2024-04-12 PROCEDURE — 3078F DIAST BP <80 MM HG: CPT | Mod: CPTII,S$GLB,, | Performed by: OBSTETRICS & GYNECOLOGY

## 2024-04-12 PROCEDURE — 1160F RVW MEDS BY RX/DR IN RCRD: CPT | Mod: CPTII,S$GLB,, | Performed by: INTERNAL MEDICINE

## 2024-04-12 RX ORDER — FLUOROURACIL 50 MG/G
CREAM TOPICAL
COMMUNITY
Start: 2024-03-04

## 2024-04-12 RX ORDER — TOBRAMYCIN / DEXAMETHASONE 3; .5 MG/ML; MG/ML
1 SUSPENSION/ DROPS OPHTHALMIC 4 TIMES DAILY
COMMUNITY
Start: 2024-03-26

## 2024-04-12 RX ORDER — ESTRADIOL 0.1 MG/G
CREAM VAGINAL
Qty: 42.5 G | Refills: 3 | Status: SHIPPED | OUTPATIENT
Start: 2024-04-12

## 2024-04-12 NOTE — PROGRESS NOTES
Chief Complaint   Patient presents with    Vaginal Prolapse     Incomplete bladder emptying         HPI: Patient is a 78 y.o. female  with chronic kidney disease due to polycystic kidney disease (eGFR 26 mL/min and Crt 1.98 on 24) who presents today with c/o vaginal prolapse.     Patient reports that she has noticed a vaginal bulge for about 1-2 months. She has not noticed any change in size since onset. Finds that the bulge it present at the vaginal opening and not past it. Since she has discovered it she is more aware of it. Feels like she his sitting on something and has a heaviness and pressure. She feels like she is not able to relax to empty. Afraid to push. Her urine stream is noted to be slow but continuous. Feels that she does not empty well. She denies splinting for urination or having bowel movements.     She endorses urinary urgency. She reports taking medication for her chronic kidney disease that makes her have the urge to void. She states that she plans her day to avoid being away from a bathroom. She has urinary frequency, voiding every hour. Unsure if this is an effect of the medication. She wakes 2-3 times per night to void. She denies enuresis. She denies loss of urine with coughing, sneezing, and laughing. Reports that if she has a strong urge she could leak urine but once it starts she loses complete control of her bladder.     She reports normal bowel movements and denies accidental bowel leakage.   Review of Systems   Constitutional:  Negative for activity change, appetite change, chills, fatigue, fever and unexpected weight change.   HENT:  Negative for nasal congestion, dental problem, hearing loss, mouth dryness and sore throat.    Eyes:  Negative for pain and eye dryness.   Respiratory:  Negative for cough, shortness of breath and wheezing.    Cardiovascular:  Negative for chest pain, palpitations and leg swelling.   Gastrointestinal:  Negative for abdominal distention,  abdominal pain, blood in stool, constipation and rectal pain.   Endocrine: Negative for cold intolerance and heat intolerance.   Genitourinary:  Negative for dyspareunia, hot flashes and vaginal dryness.   Musculoskeletal:  Negative for arthralgias, back pain, gait problem, joint swelling, myalgias, neck pain and neck stiffness.   Integumentary:  Negative for rash.   Neurological:  Negative for dizziness, tremors, seizures, speech difficulty, weakness, light-headedness, numbness and headaches.   Psychiatric/Behavioral:  Negative for confusion, dysphoric mood and sleep disturbance. The patient is not nervous/anxious.         Past Medical History:   Diagnosis Date    Cataract     Diverticulosis     Nephrolithiasis     Osteopenia     PCK (polycystic kidney disease)     Vitamin D deficiency        Past Surgical History:   Procedure Laterality Date     SECTION      twins    COLONOSCOPY N/A 2019    Procedure: COLONOSCOPY;  Surgeon: Keven Mckoy MD;  Location: [address];  Service: Endoscopy;  Laterality: N/A;    COSMETIC SURGERY      EYE SURGERY      FACIAL COSMETIC SURGERY      NASAL SEPTUM SURGERY      REFRACTIVE SURGERY Bilateral     Lasik    TUBAL LIGATION      w/          Current Outpatient Medications:     cholecalciferol, vitamin D3, (VITAMIN D3) 50 mcg (2,000 unit) Cap capsule, Take 1 capsule by mouth once daily., Disp: , Rfl:     JYNARQUE 45 mg (AM)/ 15 mg (PM) TbSQ, Take by mouth., Disp: , Rfl:     losartan (COZAAR) 25 MG tablet, Take 12.5 mg by mouth., Disp: , Rfl:     PROLIA 60 mg/mL Syrg, Inject into the skin., Disp: , Rfl:     TOBRADEX ST 0.3-0.05 % DrpS, Place 1 drop into the right eye 4 (four) times daily., Disp: , Rfl:     ALPRAZolam (XANAX) 0.25 MG tablet, Take 1 tablet (0.25 mg total) by mouth 2 (two) times daily as needed for Anxiety., Disp: 20 tablet, Rfl: 0    estradioL (ESTRACE) 0.01 % (0.1 mg/gram) vaginal cream, 0.5 grams with applicator or dime-sized  amount with finger in vagina nightly x 2 weeks, then three times a week thereafter at bedtime., Disp: 42.5 g, Rfl: 3    fluorouraciL (EFUDEX) 5 % cream, Apply topically. (Patient not taking: Reported on 2024), Disp: , Rfl:     Review of patient's allergies indicates:  No Known Allergies    Family History   Problem Relation Age of Onset    Diabetes Mother     Hypertension Mother     Stroke Mother     Heart disease Father          at 59 -  2/2 drowning - suspect cardiac cause that led to drowning    No Known Problems Sister     No Known Problems Sister     Stroke Daughter         CVA - on ocps    Breast cancer Maternal Grandmother     No Known Problems Son     No Known Problems Son     No Known Problems Son     Blindness Neg Hx     Glaucoma Neg Hx     Macular degeneration Neg Hx     Retinal detachment Neg Hx        Social History     Socioeconomic History    Marital status:    Occupational History    Occupation: Retired laywer   Tobacco Use    Smoking status: Former     Current packs/day: 0.00     Average packs/day: 0.3 packs/day for 40.0 years (10.0 ttl pk-yrs)     Types: Cigarettes     Start date: 1967     Quit date: 2007     Years since quittin.3    Smokeless tobacco: Never   Substance and Sexual Activity    Alcohol use: Yes     Alcohol/week: 7.0 standard drinks of alcohol     Types: 7 Glasses of wine per week    Drug use: No    Sexual activity: Not Currently     Partners: Male     Comment:  to Jesica   Social History Narrative    .  Plays golf frequently 2 days per week.  4 kids.    Exercising - 2 days per week with group          Lives with spouse. Feels safe in her home.      Social Determinants of Health     Financial Resource Strain: Low Risk  (2023)    Overall Financial Resource Strain (CARDIA)     Difficulty of Paying Living Expenses: Not hard at all   Food Insecurity: No Food Insecurity (2023)    Hunger Vital Sign     Worried About Running Out of  Food in the Last Year: Never true     Ran Out of Food in the Last Year: Never true   Transportation Needs: No Transportation Needs (2023)    PRAPARE - Transportation     Lack of Transportation (Medical): No     Lack of Transportation (Non-Medical): No   Physical Activity: Sufficiently Active (2023)    Exercise Vital Sign     Days of Exercise per Week: 3 days     Minutes of Exercise per Session: 50 min   Stress: No Stress Concern Present (2023)    [employer] - Occupational Stress Questionnaire     Feeling of Stress : Only a little   Social Connections: Unknown (2023)    Social Connection and Isolation Panel [NHANES]     Frequency of Communication with Friends and Family: More than three times a week     Frequency of Social Gatherings with Friends and Family: Twice a week     Active Member of Clubs or Organizations: No     Attends Club or Organization Meetings: Never     Marital Status:    Housing Stability: Low Risk  (2023)    Housing Stability Vital Sign     Unable to Pay for Housing in the Last Year: No     Number of Places Lived in the Last Year: 1     Unstable Housing in the Last Year: No       OB History          3    Para   3    Term   3            AB        Living   4         SAB        IAB        Ectopic        Multiple   1    Live Births               Obstetric Comments    x 2   x 1 for twins               Gyn History    Mammogram: 24 BIRADS 1 negative  Pap smear: none on file  LMP: No LMP recorded. Patient is postmenopausal.   Postmenopausal bleeding: denies       INITIAL PHYSICAL EXAMINATION    Vitals:    24 0808   BP: (!) 162/73      General: Healthy in appearance, Well nourished, Affect Normal, NAD.  Neck: Supple, No masses, Trachea midline, Thyroid normal size,  non-tender, no masses or nodules.  Nodes: No clavicular/cervical adenopathy.  Skin: Normal temperature, No atypical lesions or rash.  Heart:  Normal sounds, no murmurs  Lungs: Normal respiratory effort.  Gastrointestinal: Non tender, Non distended, No masses, guarding or  rebound, No hepatosplenomegally, No hernia.  Ext: No clubbing, cyanosis, edema or varicosities.  DTR's: 2+ bilaterally  Strength 5/5 bilateral upper and lower extremities    Genitourinary- (Verbal consent obtained; Female chaperone present during the pelvic exam)    Vulva: normal without lesions, masses, atrophy or pain  Urethra: meatus central and normal in appearance, no prolapse/caruncle, no masses or discharge. Empty supine stress test was negative.  Bladder: non-tender, no masses  Vagina: No discharge or lesions, severe atrophy, no masses appreciated.  [See POP-Q]  Cervix: no lesions, no discharge  Uterus:  non-tender, anteverted, approximately 4 weeks size  Adnexa: no masses, non tender.  Rectal: deferred   Neuro: S2-4- pin prick and light touch intact, Anal wink present  Levator strength: 1/5  Levator tenderness: left 0/10 and right 0/10    POP-Q Exam- pelvic organ prolapse quantitative    Aa -1.5  Anterior Wall Ba -1.5  Anterior wall C -2  Cervix or cuff   Gh 2.5  Genital hiatus pb 3  perineal body tvl 8.5  Total vaginal length   Ap -2  Posterior wall Bp -2  Posterior wall D -5  Posterior fornix     with Uterus    POP-Q Stage: 2      Office Visit on 04/12/2024   Component Date Value Ref Range Status    Color, UA 04/12/2024 Yellow   Final    pH, UA 04/12/2024 7   Final    WBC, UA 04/12/2024 2+   Final    Nitrite, UA 04/12/2024 negative   Final    Protein, POC 04/12/2024 negative   Final    Glucose, UA 04/12/2024 normal   Final    Ketones, UA 04/12/2024 negative   Final    Urobilinogen, UA 04/12/2024 negative   Final    Bilirubin, POC 04/12/2024 negative   Final    Blood, UA 04/12/2024 negative   Final    Clarity, UA 04/12/2024 Clear   Final    Spec Grav UA 04/12/2024 1.005   Final    POC Residual Urine Volume 04/12/2024 47  0 - 100 mL Final        ASSESSMENT & PLAN:    Incomplete  uterovaginal prolapse  -     Ambulatory referral/consult to Urogynecology  -     Ambulatory referral/consult to Urogynecology  -     POCT URINE DIPSTICK WITHOUT MICROSCOPE  -     POCT Bladder Scan  -     Ambulatory referral/consult to Physical/Occupational Therapy; Future; Expected date: 04/19/2024    Urinary urgency  -     Urine culture  -     Urinalysis Microscopic  -     Ambulatory referral/consult to Physical/Occupational Therapy; Future; Expected date: 04/19/2024    Urge incontinence of urine    Urinary frequency  -     Ambulatory referral/consult to Physical/Occupational Therapy; Future; Expected date: 04/19/2024    Vaginal atrophy  -     estradioL (ESTRACE) 0.01 % (0.1 mg/gram) vaginal cream; 0.5 grams with applicator or dime-sized amount with finger in vagina nightly x 2 weeks, then three times a week thereafter at bedtime.  Dispense: 42.5 g; Refill: 3       Exam findings and treatment options were discussed in detail with the patient. Her symptoms and exam findings are consistent with overactive bladder and pelvic organ prolapse. We discussed various surgical and nonsurgical management options including pessary use, pelvic floor rehabilitation, and reconstructive pelvic surgery. At this time she would prefer a nonsurgical treatment approach. She will start with bladder training and fluid titration as well as pelvic floor physical therapy. We additionally reviewed the risks and benefits of vaginal estrogen cream and patient was amenable to trying it. She'll be scheduled for a followup appointment in 3-4 months at which time further treatment options will be considered if there is not significant improvement in her symptoms.    All questions were answered today. The patient was encouraged to contact the office as needed with any additional questions or concerns.     Total time spent on visit was 45 minutes.  This includes face to face time and non-face to face time preparing to see the patient (eg, review of  tests), Obtaining and/or reviewing separately obtained history, Documenting clinical information in the electronic or other health record, Independently interpreting resultsand communicating results to the patient/family/caregiver, or Care coordination.    Emily Ramirez MD

## 2024-04-12 NOTE — PROGRESS NOTES
Normal more Subjective:   Patient ID: Nelsy Mckenna is a 78 y.o. female  Chief complaint:   Chief Complaint   Patient presents with    Pre-op Exam       HPI  Here for pre op for cataract extraction:     Exercising   Yoga 2 days per week  1 day per week   Walking and active   Able to perform household chores without limitation     - reviewed recent labs through nephrology     Ckd stable   Followed by renal   1/2 dose losartan 25     Osteoporosis:  - nisha prolia injection   - tolerated well after renal ordered   - more consistent with vit d supplement   - eats yogurt - discussed adding calcium suppl  - exercising - yoga 2x/week, strength training 1x/wk - cardio when weather better  Previously:   - reviewed recent DEXA and indications to start medication  - she wanted to hold off on medication   - discussed role of meds and pot f/u with endo to discuss all options - she will let me know if would like to do so in future   - Less exercise over past year   - Stopped all supplements when renal function recently worsened   Prev:   - discussed c/f boniva/alendronate due to fluctuating kidney labs over the past year and discussed prolia   - she will continue with exercise and supplements and consider prolia later in year after starting tolvaptin in early 2022 - we also discussed that we can consider endo appt as well in future   - Dexa 2/2023    CKD4, PKD:  - as above  - followed by nephrology - Dr. Clark   - on tolvaptan  Previously:    - had CT scan and had follow up in October 5, 2021 for WILLY on CKD3 - renal function improved on chart review and now stable   - inc to > 2 after ppi use and then improved as above   - CT showed PKD   - no longer on losartan 25mg but plan is to resume if bp increases    Lower back pain:   tried melthocarb and had appt to be asessed and was better   Yoga for exercise     Vit d def, hyperparathyroidism:   Pth inc and vit d d and did resume daily vit d   Stay well hydrated     Mild anemia:  "  Resolved on recent labs 8/10/2023  Prev:   - no iron def at this time   - Seen by h/o 8/24/2021 for anemia   - cbcs stable     Vit d def:    - vit d   Prev:   Was taken off of all supplements due to sd - ok to resume   Previously:  Completed rx dose for 3 months but never started otc daily today - agrees to do so     GERD:   - improved and asymptomatic at this time with reducing triggers   Prev:  - stopped red wine   - taking pepcid prn and stable   In Past:   - reports acid reflux at time to post throat  - dependent on intake of food triggers like red sauce and red wine - taking ranitidine prn for sx and effective when needed   Drinking 1 cup coffee in am   No cp, sob, nasuea, diaphoresis vomiting or diarrhea or abd pian, weight loss     HM:  Tdap due 7/2023  Covid     H/o: zander - anemia  Renal: cardenas 2/3/2023  Ophthal: julianne   Opt: Rebeka   Ortho: David    Review of Systems   Constitutional:  Negative for activity change and unexpected weight change.   HENT:  Negative for hearing loss, rhinorrhea and trouble swallowing.    Eyes:  Negative for discharge and visual disturbance.   Respiratory:  Negative for chest tightness and wheezing.    Cardiovascular:  Negative for chest pain and palpitations.   Gastrointestinal:  Negative for blood in stool, constipation, diarrhea and vomiting.   Endocrine: Negative for polydipsia and polyuria.   Genitourinary:  Negative for difficulty urinating, dysuria, hematuria and menstrual problem.   Musculoskeletal:  Negative for arthralgias, joint swelling and neck pain.   Neurological:  Negative for weakness and headaches.   Psychiatric/Behavioral:  Negative for confusion and dysphoric mood.      Objective:  Vitals:    04/12/24 1036 04/12/24 1107   BP: (!) 178/82 128/70   BP Location: Left arm    Patient Position: Sitting    Pulse: 71    SpO2: 98%    Weight: 53.7 kg (118 lb 6.2 oz)    Height: 5' 3" (1.6 m)      Body mass index is 20.97 kg/m².    Physical Exam  Vitals reviewed. "   Constitutional:       Appearance: Normal appearance. She is well-developed.   HENT:      Head: Normocephalic and atraumatic.   Eyes:      Extraocular Movements: Extraocular movements intact.      Conjunctiva/sclera: Conjunctivae normal.   Cardiovascular:      Rate and Rhythm: Normal rate and regular rhythm.      Pulses: Normal pulses.      Heart sounds: Normal heart sounds.   Pulmonary:      Effort: Pulmonary effort is normal.      Breath sounds: Normal breath sounds.   Abdominal:      General: Bowel sounds are normal.      Palpations: Abdomen is soft.   Musculoskeletal:         General: No swelling or tenderness.      Cervical back: Normal range of motion and neck supple.   Skin:     General: Skin is warm and dry.      Capillary Refill: Capillary refill takes less than 2 seconds.      Nails: There is no clubbing.   Neurological:      General: No focal deficit present.      Mental Status: She is alert and oriented to person, place, and time.      Gait: Gait normal.   Psychiatric:         Speech: Speech normal.         Behavior: Behavior normal.         Thought Content: Thought content normal.       Assessment:  1. Pre-operative clearance    2. Hyperparathyroidism    3. Osteoporosis without current pathological fracture, unspecified osteoporosis type    4. Other proteinuria    5. PKD (polycystic kidney disease)    6. Vitamin D deficiency    7. GERD without esophagitis    8. CKD (chronic kidney disease) stage 4, GFR 15-29 ml/min    9. Anxiety      Plan:    Nelsy was seen today for pre-op exam.    Diagnoses and all orders for this visit:    Pre-operative clearance  Pt low risk for low risk procedure   Able to perform > 4 mets   Form completed today     Hyperparathyroidism    Osteoporosis without current pathological fracture, unspecified osteoporosis type    Other proteinuria    PKD (polycystic kidney disease)    Vitamin D deficiency    GERD without esophagitis    CKD (chronic kidney disease) stage 4, GFR 15-29  ml/min    Anxiety    Cont current regimen   F/u with specialists     Health Maintenance   Topic Date Due    TETANUS VACCINE  07/12/2023    DEXA Scan  03/29/2026    Lipid Panel  03/29/2028    Hepatitis C Screening  Completed    Shingles Vaccine  Completed

## 2024-04-13 LAB — BACTERIA UR CULT: NORMAL

## 2024-04-16 ENCOUNTER — TELEPHONE (OUTPATIENT)
Dept: INTERNAL MEDICINE | Facility: CLINIC | Age: 79
End: 2024-04-16
Payer: MEDICARE

## 2024-04-16 NOTE — TELEPHONE ENCOUNTER
Left message on voicemail to let her know I faxed pre op clearance form to surgeons office.   Pt A&Ox2 Confused SBA, Take pills whole, on ,Mod carb diet,  BG Checks,  Needs met this shift, On Tele,  Denied SOB,CP, Discharge tomorrow.

## 2024-04-22 PROBLEM — N18.32 STAGE 3B CHRONIC KIDNEY DISEASE: Status: RESOLVED | Noted: 2019-05-06 | Resolved: 2024-04-22

## 2024-05-03 ENCOUNTER — CLINICAL SUPPORT (OUTPATIENT)
Dept: REHABILITATION | Facility: OTHER | Age: 79
End: 2024-05-03
Attending: OBSTETRICS & GYNECOLOGY
Payer: MEDICARE

## 2024-05-03 DIAGNOSIS — R39.15 URINARY URGENCY: ICD-10-CM

## 2024-05-03 DIAGNOSIS — R27.8 COORDINATION IMPAIRMENT: ICD-10-CM

## 2024-05-03 DIAGNOSIS — M62.89 PELVIC FLOOR DYSFUNCTION: Primary | ICD-10-CM

## 2024-05-03 DIAGNOSIS — R35.0 URINARY FREQUENCY: ICD-10-CM

## 2024-05-03 DIAGNOSIS — N81.2 INCOMPLETE UTEROVAGINAL PROLAPSE: ICD-10-CM

## 2024-05-03 PROCEDURE — 97530 THERAPEUTIC ACTIVITIES: CPT | Mod: PN | Performed by: PHYSICAL THERAPIST

## 2024-05-03 PROCEDURE — 97161 PT EVAL LOW COMPLEX 20 MIN: CPT | Mod: PN | Performed by: PHYSICAL THERAPIST

## 2024-05-03 NOTE — PATIENT INSTRUCTIONS
DOUBLE VOIDING - Double voiding is a technique that may assist the bladder to empty more effectively when urine is left in the bladder. It involves passing urine more than once each time that you go to the toilet. This makes sure that the bladder is completely empty.    Here are 3 strategies you can try to fully empty your bladder.  You do not have to do all of these things every single time. Find which ones work best for you.     Check to make sure your pelvic floor is relaxed   Do a body scan - make sure your legs, buttocks, and abdominals are relaxed.  Take a couple deep, slow breaths to encourage your pelvic floor muscles to DROP (ie. Try Diaphragmatic Breathing).  Gently apply pressure over your bladder.  Change your pelvic position: lean forward, rock your pelvis forward and backward, stand up then sit back down.     Wait at least 30 seconds to see if a second urine stream begins.   Do not stop your urine stream or push/strain!      *Try to wait 90 minutes between voids    Transverse abdominus (TrA) brace + kegel, 2 sets of 10 with 5-second hold  - Inhale to prepare, relax the belly and pelvic floor  - As you exhale, gently engage the transverse abdominis by pulling the two front hip bones together, as well as lifting/squeezing the pelvic floor (Kegel)  - Inhale again to rest  *Don't hold your breath       Bridging, 2-3 sets of 10  - Inhale to prepare, relax the belly and pelvic floor  - As you exhale, gently engage the transverse abdominis by pulling the two hip bones together while also squeezing the pelvic floor  - Holding the belly button in, lift the hips (only lift as high as it is comfortable) and lower  - Inhale again to rest  *Don't hold your breath

## 2024-05-03 NOTE — PLAN OF CARE
[facility]   Pelvic Health Physical Therapy Initial Evaluation      Date: 5/3/2024   Name: Nelsy Mckenna  Clinic Number: [id number]    Therapy Diagnosis:   Encounter Diagnoses   Name Primary?    Pelvic floor dysfunction Yes    Coordination impairment      Referring Provider: Emily Ramirez MD    Referring Provider Orders: PT Eval and Treat  Medical Diagnosis from Referral: Incomplete uterovaginal prolapse [N81.2], Urinary urgency [R39.15], Urinary frequency [R35.0]   Evaluation Date: 5/3/2024  Authorization Period Expiration: 2024  Plan of Care Expiration: 8/3/2024  Visit # / Visits authorized: 1/pending  FOTO: 1 (5/3/2024)    Precautions: standard    Time In: 13:00  Time Out: 13:45  Total Appointment Time (timed & untimed codes): 45 minutes    SUBJECTIVE     Date of onset: a few years ago  History of current condition: Nelsy reports vaginal bulge/pelvic pressure for the last few months, as well as urinary urgency. She used to have urge incontinence but now voids frequently to avoid accidents.    OB/GYN HISTORY -   (2 vaginal deliveries, 1 )    BLADDER HISTORY  Frequency of urination:   Day: at least every 2 hours           Night: 2-3x  Difficulty initiating urine stream: Yes  Hover over toilet seats: Yes  Complete emptying: No  Post-void dribbling: No  Urinary Urgency: Yes  Bladder leakage: Yes  Frequency of incidents: rarely    BOWEL HISTORY  Frequency of bowel movements: once every couple days  Difficulty initiating BM: sometimes  Quality/shape of BM: [place] Stool Chart 3-4  Incomplete emptying? No    SEXUAL/PELVIC PAIN HISTORY  Pain with vaginal exams, intercourse or tampon use? No  Pain with wearing certain clothes, sitting on certain surfaces? No  Feeling of pelvic heaviness? Yes    Pain: chronic, intermittent low back pain (R > L)  Imaging: none pertinent to the pelvic floor    Prior Therapy: no prior pelvic floor therapy  Social History: lives in a house with steps  to enter, with spouse  Current exercise: yoga and resistance training (3x/week)  Occupation: retired  Prior Level of Function: no pelvic floor dysfunction  Current Level of Function: frequent pelvic pressure, daily urinary urgency/frequency, rare leakage    Types of fluid intake: lots of water  Diet: probably doesn't eat enough vegetables  Habitus: well developed, well nourished  Abuse/Neglect: none reported    Patients goals: improve bladder control     Medical History: Nelsy  has a past medical history of Cataract, Diverticulosis, Nephrolithiasis, Osteopenia, PCK (polycystic kidney disease), and Vitamin D deficiency.     Surgical History: Nelsy Mckenna  has a past surgical history that includes  section (); Nasal septum surgery; Facial cosmetic surgery; Tubal ligation (); Refractive surgery (Bilateral, ); Colonoscopy (N/A, 2019); Eye surgery; and Cosmetic surgery.    Medications: Nelsy has a current medication list which includes the following prescription(s): alprazolam, cholecalciferol (vitamin d3), estradiol, fluorouracil, jynarque, losartan, prolia, and tobradex st.    Allergies: Review of patient's allergies indicates:  No Known Allergies     OBJECTIVE     See Electronic Medical Record under MEDIA for written consent provided 5/3/2024  Chaperone: declined    Hip Strength 5/3/2024   R hip flexion 4+/5   R hip external rotation 4/5   L hip flexion 4+/5   L hip external rotation 4/5     ABDOMINAL WALL ASSESSMENT  Palpation: no tenderness  Pelvic Girdle Stability: Pt demonstrates mildly impaired ability to stabilize pelvis with Active Straight Leg Raise Test.   Scarring:  scar (not painful, good mobility)  Motor Control: oblique-dominant    BREATHING MECHANICS ASSESSMENT   Thorax Assessment During Quiet Respiration: Decreased excursion of abdominal wall   Thorax Assessment During Deep Respiration: Decreased excursion of abdominal wall     VAGINAL PELVIC FLOOR EXAM - external  assessment  Introitus: WNL  Skin condition: WNL  Scarring: none   Sensation: WNL   Voluntary contraction: visible lift and accessory muscle use  Voluntary relaxation: visible drop  Bearing down: nil     VAGINAL PELVIC FLOOR EXAM - internal assessment  Palpation: no tenderness, but palpation of layer 2 muscles bilaterally produces urinary urgency (L > R)  Sensation: able to sense generalized pressure, unable to identify location   Vaginal vault: roomy   Muscle Bulk: normal  Muscle Power: 2/5  Muscle Endurance: 6 seconds  # Reps To Fatigue: 5      Quality of contraction: slow rise   Specificity: patient contracts: trunk and adductors   Coordination: WNL   Prolapse check: grade 2, apical     Limitation/Restriction for FOTO Pelvic Floor Surveys    Therapist reviewed FOTO scores for Nelsy Mckenna on 5/3/2024  FOTO documents entered into [employer] - see Media section.    Limitation Score:   PFDI Prolapse - 8% impairment  Urinary Problem - 40% impairment  PFDI Urinary - 21% impairment     TREATMENT     Total Treatment time (time-based codes) separate from the evaluation: 22 minutes     Therapeutic activities to improve functional performance for 15 minutes -  [x] Instruction on urge suppression techniques  [x] Instruction on pressure management to protect pelvic organ prolapse  [x] HEP building/HEP review    Neuromuscular re-education activities to develop balance, coordination, kinesthetic sense, motor control, proprioception, and posture for 4 minutes -   [x] PFM squeeze without muscle substitution - able to demonstrate correctly with verbal cues  [x] TrA brace without oblique activation - able to demonstrate correctly with verbal and tactile cues  [x] TrA brace + straight leg raise (R), x3    Manual therapy techniques: to develop flexibility, desensitization, and extensibility for 3 minutes -  [x] Manual release to pelvic floor muscles internally/vaginally: sphincter urethrovaginalis, compressor urethrae (B)      PATIENT  EDUCATION AND HOME EXERCISES     Education provided: general anatomy/physiology of urinary & bowel system, benefits of treatment, and alternative methods of treatment were discussed with the patient. Additionally, Nelsy was provided education on pt prognosis, PT plan of care, pelvic floor anatomy & function, relationship between TrA & PFM, relationship between hips & PFM, urge suppression, etiology of urinary urgency, and abdominal wall anatomy    Written Home Exercises provided: yes  Exercises were reviewed, and Nelsy was able to demonstrate them prior to the end of the session. Nelsy demonstrated good understanding of the education provided. See EMR under 'Patient Instructions' for exercises and education provided during session.    ASSESSMENT     Nelsy is a 78 y.o. female referred to outpatient physical therapy with a medical diagnosis of Incomplete uterovaginal prolapse [N81.2], Urinary urgency [R39.15], Urinary frequency [R35.0].   Pt presents with deficits to pelvic floor muscle strength, endurance, and coordination, as well as hip weakness and pelvic girdle stability deficits.   Symptoms appear consistent with poor bladder habits, bladder irritation from prolapse, and pelvic floor muscle dysfunction, leading to urinary urgency/frequency.   Symptoms impair the patient's ability to perform ADLs and functional mobility, as well as remain continent.    Patient prognosis is good  Nelsy will benefit from skilled outpatient physical therapy to address the deficits stated above and in the chart below, provide patient/family education, and to maximize the patient's level of independence.     Plan of care discussed with patient: yes  Patient's spiritual, cultural and educational needs considered, and the patient is agreeable to the plan of care and goals as stated below:     Anticipated Barriers for therapy: none    Medical Necessity is demonstrated by the following -  History  Co-morbidities and personal factors that may impact  the plan of care Co-morbidities   Tubal ligation ()   section ()  Cataract, Diverticulosis, Nephrolithiasis, Osteopenia, PCK (polycystic kidney disease), and Vitamin D deficiency    Personal Factors  age     moderate   Examination  Body structures and functions, activity limitations and participation restrictions that may impact the plan of care Body Regions/Systems/Functions:  poor trunk stability, decreased pelvic muscle strength, decreased endurance of the pelvic muscles, poor quality of pelvic muscle contraction, increased frequency of urination, increased nocturia, and poor coordination of pelvic floor muscles during ADL's leading to urinary or fecal leakage     Activity Limitations:  urgency, sleep uninterrupted by excessive nocturia, and bathroom mapping    Participation Restrictions:  all ADLs/iADLs uninterrupted by urinary incontinence/urgency/frequency, social activities with friends/family, Sleep restrictions, and Pelvic pressure with ADLs.     Activity limitations:   Learning and applying knowledge  no deficits    General Tasks and Commands  no deficits    Communication  no deficits    Mobility  no deficits    Self care  toileting    Domestic Life  no deficits    Interactions/Relationships  no deficits    Life Areas  no deficits    Community and Social Life  Community life, recreation & leisure       moderate   Clinical Presentation stable and uncomplicated low   Decision Making/ Complexity Score: low         Short Term Goals: 6 weeks   Pt to report urinary frequency of no more than every 1.5 hours to demonstrate improved bladder control  Pt to be able to delay urination for at least 5 minutes with strong urge to demonstrate improved bladder control  Pt to verbalize urge suppression strategies for improved control over urgency and urge urinary incontinence   Pt to be independent with introductory home exercise program     Long Term Goals: 12 weeks   Pt to report nocturia of no more than  1-2 times per night for improved sleep quality  Pt to report urinary frequency of no more than every 2 hours to demonstrate improved bladder control   Pt to score no more than 4% impairment on the PFDI Prolapse FOTO survey to demonstrate reduced impairment due to prolapse  Pt to score no more than 20% impairment on the Urinary Problem FOTO survey to demonstrate reduced impairment due to pelvic floor dysfunction   Pt to be independent with advanced home exercise program      PLAN     Plan of Care certification: 5/3/2024 to 8/3/2024    Outpatient Physical Therapy - 1 time per week for 12 weeks to include the following interventions: Therapeutic Exercise, Manual Therapy, Therapeutic Activity, Neuromuscular Re-education, Electrical Stimulation Unattended, Self-Care, Patient Education      Kandace Duncan, PT, DPT

## 2024-05-06 NOTE — PROGRESS NOTES
Pelvic Health Physical Therapy   Treatment Note     Name: Nelsy Mckenna  Clinic Number: [id number]    Therapy Diagnosis:   Encounter Diagnoses   Name Primary?    Pelvic floor dysfunction Yes    Coordination impairment      Referring Provider: Emily Ramirez MD    Visit Date: 5/9/2024    Referring Provider Orders: PT Eval and Treat  Medical Diagnosis from Referral: Incomplete uterovaginal prolapse [N81.2], Urinary urgency [R39.15], Urinary frequency [R35.0]   Evaluation Date: 5/3/2024  Authorization Period Expiration: 12/31/2024  Plan of Care Expiration: 8/3/2024  Visit # / Visits authorized: 2/10  FOTO: 1 (5/3/2024)    Cancelled Visits: -  No Show Visits: -    Time In: 11:30  Time Out: 12:10  Total Billable Time: 40 minutes    Precautions: standard    Subjective     Nelsy reports improving urinary frequency, with effort. She practices double voiding and a little bit more urine comes out. She is not sure if she is doing the pelvic floor muscle squeezes with breath correctly. She declines direct pelvic floor muscle assessment today.    She was compliant with home exercise program.  Response to previous treatment: no adverse effect  Functional change: reduced urinary frequency    Pain: none reported    Objective     Nelsy received the following interventions during the treatment session:   (TrA = transverse abdominis, PFM = pelvic floor muscle, sEMG = surface electromyography, RUSI = Rehabilitative Ultrasound Imaging)  Pt consented to pelvic floor muscle assessment and treatment on 5/3/24. See EMR.    Therapeutic activities to improve functional performance for 23 minutes -  [x] Assessment of pelvic floor contraction under bladder with Rehabilitative Ultrasound Imaging (RUSI) - Pt demonstrates appropriate pelvic floor lengthening with inhale, partially correct pelvic floor contraction, and bilateral paravaginal defects (R > L).  [x] Review of urge suppression techniques  [x] Review of pressure management to protect pelvic organ  prolapse  [x] Education on prognosis with prolapse, maneuvers to decrease pelvic pressure when present  [x] TrA brace + PFM squeeze + sit to stand, x5  [x] HEP building/HEP review     Neuromuscular re-education activities to develop balance, coordination, kinesthetic sense, motor control, proprioception, and posture for 15 minutes -   [x] Seated PFM squeeze without muscle substitution - verbal cues required to reduce adductor activation  [x] PFM squeeze with RUSI - verbal cues required to reduce downward pressure from abdomen with attempted squeeze, able to demonstrate correctly after training  [] TrA brace without oblique activation - able to demonstrate correctly with verbal and tactile cues  [x] TrA brace + PFM squeeze + bridging, x15  [x] TrA brace + PFM squeeze + hooklying hip adduction isometric with ball between knees, x15 with 3-second hold  [x] TrA brace + straight leg raise (R), 2x5     Manual therapy techniques: to develop flexibility, desensitization, and extensibility for 0 minutes -  [] Manual release to pelvic floor muscles internally/vaginally: sphincter urethrovaginalis, compressor urethrae (B)      Home Exercises and Patient Education     Patient Education: progression of plan of care, plan for next session, pt prognosis, pelvic floor anatomy & function, relationship between TrA & PFM, etiology of urinary urgency, timeline for muscle changes with consistent strength training, neuroplasticity, pressure management for support of pelvic organ prolapse, and abdominal wall anatomy    Home Exercise Program (5/3/24): PFM squeeze, PFM squeeze + bridging, urge suppression, double void, wait at least 90 minutes between voids  Home Exercise Program Updates (5/9/24): none    Exercises were reviewed, and Nelsy was able to demonstrate them prior to the end of the session, as needed. Nelsy demonstrated good understanding of the education provided.     Assessment     Pt tolerated treatment session well, with good  understanding of education provided and improving coordination within session. Pt requires heavy verbal/manual cues to synch breath, TrA contraction, PFM contraction, and movement during exercises, but she improved with practice; Further training indicated. Pt's functional mobility and ability to perform ADLs still limited by pelvic floor dysfunction. She requires skilled therapy for continued patient education and coordination retraining.      Nelsy is progressing well towards her goals.   Pt prognosis: good    Pt will continue to benefit from skilled outpatient physical therapy to address the deficits listed in the problem list box on initial evaluation, provide pt/family education and to maximize pt's level of independence in the home and community environment.     Pt's spiritual, cultural and educational needs considered and pt agreeable to plan of care and goals.  Anticipated barriers to physical therapy: none      Short Term Goals: 6 weeks   Pt to report urinary frequency of no more than every 1.5 hours to demonstrate improved bladder control - MET  Pt to be able to delay urination for at least 5 minutes with strong urge to demonstrate improved bladder control - PARTIALLY MET  Pt to verbalize urge suppression strategies for improved control over urgency and urge urinary incontinence - MET   Pt to be independent with introductory home exercise program - MET     Long Term Goals: 12 weeks   Pt to report nocturia of no more than 1-2 times per night for improved sleep quality - NOT MET  Pt to report urinary frequency of no more than every 2 hours to demonstrate improved bladder control - NOT MET  Pt to score no more than 4% impairment on the PFDI Prolapse FOTO survey to demonstrate reduced impairment due to prolapse - NOT MET  Pt to score no more than 20% impairment on the Urinary Problem FOTO survey to demonstrate reduced impairment due to pelvic floor dysfunction - NOT MET  Pt to be independent with advanced home  exercise program   - NOT MET    Plan     Continue per Plan of Care      Kandace Duncan, PT, DPT

## 2024-05-09 ENCOUNTER — CLINICAL SUPPORT (OUTPATIENT)
Dept: REHABILITATION | Facility: OTHER | Age: 79
End: 2024-05-09
Payer: MEDICARE

## 2024-05-09 DIAGNOSIS — R27.8 COORDINATION IMPAIRMENT: ICD-10-CM

## 2024-05-09 DIAGNOSIS — M62.89 PELVIC FLOOR DYSFUNCTION: Primary | ICD-10-CM

## 2024-05-09 PROCEDURE — 97530 THERAPEUTIC ACTIVITIES: CPT | Mod: PN | Performed by: PHYSICAL THERAPIST

## 2024-05-09 PROCEDURE — 97112 NEUROMUSCULAR REEDUCATION: CPT | Mod: PN | Performed by: PHYSICAL THERAPIST

## 2024-05-09 NOTE — PROGRESS NOTES
Pelvic Health Physical Therapy   Treatment Note     Name: Nelsy Mckenna  Clinic Number: [id number]    Therapy Diagnosis:   Encounter Diagnoses   Name Primary?    Pelvic floor dysfunction Yes    Coordination impairment      Referring Provider: Emily Ramirez MD    Visit Date: 5/16/2024    Referring Provider Orders: PT Eval and Treat  Medical Diagnosis from Referral: Incomplete uterovaginal prolapse [N81.2], Urinary urgency [R39.15], Urinary frequency [R35.0]   Evaluation Date: 5/3/2024  Authorization Period Expiration: 12/31/2024  Plan of Care Expiration: 8/3/2024  Visit # / Visits authorized: 3/10  FOTO: 1 (5/3/2024)    Cancelled Visits: -  No Show Visits: -    Time In: 11:15  Time Out: 12:00  Total Billable Time: 43 minutes    Precautions: standard    Subjective     Nelsy reports improving urinary frequency, with effort. She practices double voiding and a little bit more urine comes out. She still feels pelvic pressure after lots of activity and worries about making it worse with straining.    She was compliant with home exercise program.  Response to previous treatment: no adverse effect  Functional change: reduced urinary frequency    Pain: none reported    Objective     Nelsy received the following interventions during the treatment session:   (TrA = transverse abdominis, PFM = pelvic floor muscle, sEMG = surface electromyography, RUSI = Rehabilitative Ultrasound Imaging)  Pt consented to pelvic floor muscle assessment and treatment on 5/3/24. See EMR.    Therapeutic activities to improve functional performance for 23 minutes -  [x] Pelvic floor assessment - see below  [] Assessment of pelvic floor contraction under bladder with Rehabilitative Ultrasound Imaging (RUSI) - Pt demonstrates appropriate pelvic floor lengthening with inhale, partially correct pelvic floor contraction, and bilateral paravaginal defects (R > L).  [x] Review of urge suppression techniques  [x] Review of pressure management to protect pelvic organ  prolapse  [x] Education on prognosis with prolapse, maneuvers to decrease pelvic pressure when present  [x] TrA brace + PFM squeeze + sit to stand, x10  [x] TrA brace + PFM squeeze + lateral step up/down on 4-inch step (R&L), x8  [x] TrA brace + suitcase carry (10#, R&L), 40ft  [x] TrA brace + PFM squeeze + 16# box lift, x2 from chair and x1 from ground - verbal cues and demonstration required  [x] HEP building/HEP review     Neuromuscular re-education activities to develop balance, coordination, kinesthetic sense, motor control, proprioception, and posture for 20 minutes -   [x] PFM squeeze without muscle substitution - verbal cues required  [x] Pelvic floor muscle coordination training with sEMG feedback: long holds (3x10 with 5-second hold) - Pt presents with normal resting tone and appropriate PFM contraction on command (with verbal cues). Pt struggles to demonstrate coordination of breath and PFM contraction but was able to demonstrate independently by the end of the session.  [] Seated PFM squeeze without muscle substitution - verbal cues required to reduce adductor activation  [] PFM squeeze with RUSI - verbal cues required to reduce downward pressure from abdomen with attempted squeeze, able to demonstrate correctly after training  [] TrA brace without oblique activation - able to demonstrate correctly with verbal and tactile cues  [] TrA brace + PFM squeeze + bridging, x15  [] TrA brace + PFM squeeze + hooklying hip adduction isometric with ball between knees, x15 with 3-second hold  [] TrA brace + straight leg raise (R), 2x5  [x] TrA brace + PFM squeeze + wall push up, x10  [x] Balance on foam pad + toe taps to cone (R&L), x20     Manual therapy techniques: to develop flexibility, desensitization, and extensibility for 0 minutes -  [] Manual release to pelvic floor muscles internally/vaginally: sphincter urethrovaginalis, compressor urethrae (B)    -----------------    VAGINAL PELVIC FLOOR EXAM - external  assessment  Introitus: WNL  Skin condition: WNL  Scarring: none   Sensation: WNL   Voluntary contraction: visible lift and accessory muscle use (gluts, adductors)  Voluntary relaxation: visible drop  Bearing down: bulge   Comments: no prolapse visible    Home Exercises and Patient Education     Patient Education: progression of plan of care, plan for next session, pt prognosis, pelvic floor anatomy & function, relationship between TrA & PFM, etiology of urinary urgency, timeline for muscle changes with consistent strength training, neuroplasticity, pressure management for support of pelvic organ prolapse, and abdominal wall anatomy    Home Exercise Program (5/3/24): PFM squeeze, PFM squeeze + bridging, urge suppression, double void, wait at least 90 minutes between voids  Home Exercise Program Updates (5/9/24): none    Exercises were reviewed, and Nelsy was able to demonstrate them prior to the end of the session, as needed. Nelsy demonstrated good understanding of the education provided.     Assessment     Pt tolerated treatment session well, with good understanding of education provided and improving coordination within session. Pt requires light verbal/manual cues to synch breath, TrA contraction, PFM contraction, and movement during exercises, but she improved with practice; Further training indicated. Improving pelvic floor and abdominal wall strength with functional activities could improve pelvic pressure over time. Pt's functional mobility and ability to perform ADLs still limited by pelvic floor dysfunction. She requires skilled therapy for continued patient education and coordination retraining.      Nelsy is progressing well towards her goals.   Pt prognosis: good    Pt will continue to benefit from skilled outpatient physical therapy to address the deficits listed in the problem list box on initial evaluation, provide pt/family education and to maximize pt's level of independence in the home and community  environment.     Pt's spiritual, cultural and educational needs considered and pt agreeable to plan of care and goals.  Anticipated barriers to physical therapy: none      Short Term Goals: 6 weeks   Pt to report urinary frequency of no more than every 1.5 hours to demonstrate improved bladder control - MET  Pt to be able to delay urination for at least 5 minutes with strong urge to demonstrate improved bladder control - PARTIALLY MET  Pt to verbalize urge suppression strategies for improved control over urgency and urge urinary incontinence - MET   Pt to be independent with introductory home exercise program - MET     Long Term Goals: 12 weeks   Pt to report nocturia of no more than 1-2 times per night for improved sleep quality - NOT MET  Pt to report urinary frequency of no more than every 2 hours to demonstrate improved bladder control - NOT MET  Pt to score no more than 4% impairment on the PFDI Prolapse FOTO survey to demonstrate reduced impairment due to prolapse - NOT MET  Pt to score no more than 20% impairment on the Urinary Problem FOTO survey to demonstrate reduced impairment due to pelvic floor dysfunction - NOT MET  Pt to be independent with advanced home exercise program   - NOT MET    Plan     Continue per Plan of Care      Kandace Duncan, PT, DPT

## 2024-05-16 ENCOUNTER — CLINICAL SUPPORT (OUTPATIENT)
Dept: REHABILITATION | Facility: OTHER | Age: 79
End: 2024-05-16
Payer: MEDICARE

## 2024-05-16 DIAGNOSIS — R27.8 COORDINATION IMPAIRMENT: ICD-10-CM

## 2024-05-16 DIAGNOSIS — M62.89 PELVIC FLOOR DYSFUNCTION: Primary | ICD-10-CM

## 2024-05-16 PROCEDURE — 97530 THERAPEUTIC ACTIVITIES: CPT | Mod: PN | Performed by: PHYSICAL THERAPIST

## 2024-05-16 PROCEDURE — 97112 NEUROMUSCULAR REEDUCATION: CPT | Mod: PN | Performed by: PHYSICAL THERAPIST

## 2024-05-16 NOTE — PROGRESS NOTES
Pelvic Health Physical Therapy   Treatment Note     Name: Nelsy Mckenna  Clinic Number: [id number]    Therapy Diagnosis:   Encounter Diagnoses   Name Primary?    Pelvic floor dysfunction Yes    Coordination impairment      Referring Provider: Emily Ramirez MD    Visit Date: 5/23/2024    Referring Provider Orders: PT Eval and Treat  Medical Diagnosis from Referral: Incomplete uterovaginal prolapse [N81.2], Urinary urgency [R39.15], Urinary frequency [R35.0]   Evaluation Date: 5/3/2024  Authorization Period Expiration: 12/31/2024  Plan of Care Expiration: 8/3/2024  Visit # / Visits authorized: 4/10  FOTO: 2    Cancelled Visits: -  No Show Visits: -    Time In: 11:15  Time Out: 12:00  Total Billable Time: 43 minutes    Precautions: standard    Subjective     Nelsy reports improving urinary frequency, with effort. She still feels pelvic pressure after lots of activity and worries about making it worse with straining. Pt declines pelvic floor assessment today.    She was compliant with home exercise program.  Response to previous treatment: no adverse effect  Functional change: reduced urinary frequency    Pain: none reported    Objective     Nesly received the following interventions during the treatment session:   (TrA = transverse abdominis, PFM = pelvic floor muscle, sEMG = surface electromyography, RUSI = Rehabilitative Ultrasound Imaging)  Pt consented to pelvic floor muscle assessment and treatment on 5/3/24. See EMR.    Therapeutic activities to improve functional performance for 23 minutes -  [] Pelvic floor assessment - see below  [] Assessment of pelvic floor contraction under bladder with Rehabilitative Ultrasound Imaging (RUSI) - Pt demonstrates appropriate pelvic floor lengthening with inhale, partially correct pelvic floor contraction, and bilateral paravaginal defects (R > L).  [x] Review of urge suppression techniques  [x] Review of pressure management to protect pelvic organ prolapse  [x] Education on prognosis  with prolapse, maneuvers to decrease pelvic pressure when present  [x] TrA brace + PFM squeeze + sit to stand, x12  [] TrA brace + PFM squeeze + lateral step up/down on 4-inch step (R&L), x8  [x] TrA brace + suitcase carry (10#, R&L), 60ft  [x] TrA brace + PFM squeeze + 10# kettle bell lift, x6 from chair - verbal cues and demonstration required  [x] Void assessment - Pt voided 4mL ~90 minutes after prior void, felt like her bladder was pretty full  [x] HEP building/HEP review     Neuromuscular re-education activities to develop balance, coordination, kinesthetic sense, motor control, proprioception, and posture for 20 minutes -   [] PFM squeeze without muscle substitution - verbal cues required  [] Pelvic floor muscle coordination training with sEMG feedback: long holds (3x10 with 5-second hold) - Pt presents with normal resting tone and appropriate PFM contraction on command (with verbal cues). Pt struggles to demonstrate coordination of breath and PFM contraction but was able to demonstrate independently by the end of the session.  [] Seated PFM squeeze without muscle substitution - verbal cues required to reduce adductor activation  [] PFM squeeze with RUSI - verbal cues required to reduce downward pressure from abdomen with attempted squeeze, able to demonstrate correctly after training  [] TrA brace without oblique activation - able to demonstrate correctly with verbal and tactile cues  [x] TrA brace + PFM squeeze + bridging with belt around knees, x15  [] TrA brace + PFM squeeze + hooklying hip adduction isometric with ball between knees, x15 with 3-second hold  [x] TrA brace + straight leg raise (R&L), 2x10  [x] TrA brace + PFM squeeze + countertop push up, x10  [x] Balance on foam pad + toe taps to step (R&L), x30  [x] TrA brace + standing shoulder extension with green tubing, x20  [x] Side-stepping with focus on upright posture with yellow band around knees, 2 minutes     Manual therapy techniques: to  develop flexibility, desensitization, and extensibility for 0 minutes -  [] Manual release to pelvic floor muscles internally/vaginally: sphincter urethrovaginalis, compressor urethrae (B)      Home Exercises and Patient Education     Patient Education: progression of plan of care, plan for next session, pt prognosis, pelvic floor anatomy & function, relationship between TrA & PFM, etiology of urinary urgency, timeline for muscle changes with consistent strength training, neuroplasticity, pressure management for support of pelvic organ prolapse, and abdominal wall anatomy    Home Exercise Program (5/3/24): PFM squeeze, PFM squeeze + bridging, urge suppression, double void, wait at least 90 minutes between voids  Home Exercise Program Updates (5/9/24): none  HEP update (5/23/24): countertop push up    Exercises were reviewed, and Nelsy was able to demonstrate them prior to the end of the session, as needed. Nelsy demonstrated good understanding of the education provided.     Assessment     Pt tolerated treatment session well, with good understanding of education provided and improving coordination within session. Pt requires light verbal/manual cues to synch breath, TrA contraction, PFM contraction, and movement during exercises, but she improved with practice; Further training indicated. Improving pelvic floor and abdominal wall strength with functional activities could improve pelvic pressure over time. Void assessment reveals pt gets urge to void with only partially full bladder - This may improve with continued urge suppression or strengthening, or it may be difficult to improve due to pressure on bladder with pelvic organ prolapse. Pt's functional mobility and ability to perform ADLs still limited by pelvic floor dysfunction. She requires skilled therapy for continued patient education and coordination retraining.      Nelsy is progressing well towards her goals.   Pt prognosis: good    Pt will continue to benefit  from skilled outpatient physical therapy to address the deficits listed in the problem list box on initial evaluation, provide pt/family education and to maximize pt's level of independence in the home and community environment.     Pt's spiritual, cultural and educational needs considered and pt agreeable to plan of care and goals.  Anticipated barriers to physical therapy: none      Short Term Goals: 6 weeks   Pt to report urinary frequency of no more than every 1.5 hours to demonstrate improved bladder control - MET  Pt to be able to delay urination for at least 5 minutes with strong urge to demonstrate improved bladder control - PARTIALLY MET  Pt to verbalize urge suppression strategies for improved control over urgency and urge urinary incontinence - MET   Pt to be independent with introductory home exercise program - MET     Long Term Goals: 12 weeks   Pt to report nocturia of no more than 1-2 times per night for improved sleep quality - NOT MET  Pt to report urinary frequency of no more than every 2 hours to demonstrate improved bladder control - NOT MET  Pt to score no more than 4% impairment on the PFDI Prolapse FOTO survey to demonstrate reduced impairment due to prolapse - NOT MET  Pt to score no more than 20% impairment on the Urinary Problem FOTO survey to demonstrate reduced impairment due to pelvic floor dysfunction - NOT MET  Pt to be independent with advanced home exercise program  - NOT MET    Plan     Continue per Plan of Care      Kandace Duncan, PT, DPT

## 2024-05-20 ENCOUNTER — PATIENT OUTREACH (OUTPATIENT)
Dept: ADMINISTRATIVE | Facility: HOSPITAL | Age: 79
End: 2024-05-20
Payer: MEDICARE

## 2024-05-20 NOTE — PROGRESS NOTES
Health Maintenance Due   Topic Date Due    TETANUS VACCINE  07/12/2023    COVID-19 Vaccine (7 - 2023-24 season) 09/01/2023   Health Maintenance reviewed, updated and links triggered. HM'S up to date. ([facility]) 5/20/24

## 2024-05-23 ENCOUNTER — CLINICAL SUPPORT (OUTPATIENT)
Dept: REHABILITATION | Facility: OTHER | Age: 79
End: 2024-05-23
Payer: MEDICARE

## 2024-05-23 DIAGNOSIS — R27.8 COORDINATION IMPAIRMENT: ICD-10-CM

## 2024-05-23 DIAGNOSIS — M62.89 PELVIC FLOOR DYSFUNCTION: Primary | ICD-10-CM

## 2024-05-23 PROCEDURE — 97530 THERAPEUTIC ACTIVITIES: CPT | Mod: PN | Performed by: PHYSICAL THERAPIST

## 2024-05-23 PROCEDURE — 97112 NEUROMUSCULAR REEDUCATION: CPT | Mod: PN | Performed by: PHYSICAL THERAPIST

## 2024-05-23 NOTE — PATIENT INSTRUCTIONS
Countertop push-up - 3 sets of 10  - Stand 4-5 feet away from a kitchen counter, with athe wall with hands shoulder-width apart. Inhale to prepare, relax the belly and pelvic floor  - As you exhale, gently engage the transverse abdominis by drawing the belly button down to the spine and squeeze the pelvic floor  - Keeping your body straight like a plank of wood, lean in towards the wall and push away  - Inhale again to rest  *Don't hold your breath

## 2024-06-06 ENCOUNTER — CLINICAL SUPPORT (OUTPATIENT)
Dept: REHABILITATION | Facility: OTHER | Age: 79
End: 2024-06-06
Payer: MEDICARE

## 2024-06-06 DIAGNOSIS — R27.8 COORDINATION IMPAIRMENT: ICD-10-CM

## 2024-06-06 DIAGNOSIS — M62.89 PELVIC FLOOR DYSFUNCTION: Primary | ICD-10-CM

## 2024-06-06 PROCEDURE — 97530 THERAPEUTIC ACTIVITIES: CPT | Mod: KX,PN | Performed by: PHYSICAL THERAPIST

## 2024-06-06 PROCEDURE — 97112 NEUROMUSCULAR REEDUCATION: CPT | Mod: KX,PN | Performed by: PHYSICAL THERAPIST

## 2024-06-06 NOTE — PROGRESS NOTES
Pelvic Health Physical Therapy   Treatment Note & Discharge Summary     Name: Nelsy Mckenna  Clinic Number: [id number]    Therapy Diagnosis:   Encounter Diagnoses   Name Primary?    Pelvic floor dysfunction Yes    Coordination impairment      Referring Provider: Emily Ramirez MD    Visit Date: 6/6/2024    Referring Provider Orders: PT Eval and Treat  Medical Diagnosis from Referral: Incomplete uterovaginal prolapse [N81.2], Urinary urgency [R39.15], Urinary frequency [R35.0]   Evaluation Date: 5/3/2024  Authorization Period Expiration: 12/31/2024  Plan of Care Expiration: 8/3/2024  Visit # / Visits authorized: 5/10  FOTO: 3    Cancelled Visits: -  No Show Visits: -    Time In: 11:12  Time Out: 11:40  Total Billable Time: 43 minutes    Precautions: standard    Subjective     Nelsy reports continued improved urinary frequency, with effort (still present). She still feels pelvic pressure after lots of activity and worries about making it worse with straining. She thinks she will need a pessary to manage pelvic pressure, and she is ready to finish therapy with updated HEP.    She was compliant with home exercise program.  Response to previous treatment: no adverse effect  Functional change: reduced urinary frequency    Pain: none reported    Objective     Nelsy received the following interventions during the treatment session:   (TrA = transverse abdominis, PFM = pelvic floor muscle, sEMG = surface electromyography, RUSI = Rehabilitative Ultrasound Imaging)  Pt consented to pelvic floor muscle assessment and treatment on 5/3/24. See EMR.    Therapeutic activities to improve functional performance for 23 minutes -  [] Pelvic floor assessment - see below  [] Assessment of pelvic floor contraction under bladder with Rehabilitative Ultrasound Imaging (RUSI) - Pt demonstrates appropriate pelvic floor lengthening with inhale, partially correct pelvic floor contraction, and bilateral paravaginal defects (R > L).  [x] Review of urge  suppression techniques  [x] Education on log-roll technique  [x] Review of pressure management to protect pelvic organ prolapse  [x] Education on prognosis with prolapse, maneuvers to decrease pelvic pressure when present  [x] TrA brace + PFM squeeze + sit to stand, x12  [] TrA brace + PFM squeeze + lateral step up/down on 4-inch step (R&L), x8  [] TrA brace + suitcase carry (10#, R&L), 60ft  [] TrA brace + PFM squeeze + 10# kettle bell lift, x6 from chair - verbal cues and demonstration required  [] Void assessment - Pt voided 4mL ~90 minutes after prior void, felt like her bladder was pretty full  [x] HEP building/HEP review     Neuromuscular re-education activities to develop balance, coordination, kinesthetic sense, motor control, proprioception, and posture for 15 minutes -   [] PFM squeeze without muscle substitution - verbal cues required  [] Pelvic floor muscle coordination training with sEMG feedback: long holds (3x10 with 5-second hold) - Pt presents with normal resting tone and appropriate PFM contraction on command (with verbal cues). Pt struggles to demonstrate coordination of breath and PFM contraction but was able to demonstrate independently by the end of the session.  [] Seated PFM squeeze without muscle substitution - verbal cues required to reduce adductor activation  [] PFM squeeze with RUSI - verbal cues required to reduce downward pressure from abdomen with attempted squeeze, able to demonstrate correctly after training  [] TrA brace without oblique activation - able to demonstrate correctly with verbal and tactile cues  [x] TrA brace + PFM squeeze + bridging, x12  [x] TrA brace + PFM squeeze + hooklying hip adduction isometric with ball between knees, x6 with 5-second hold  [x] TrA brace + PFM squeeze + seated hip adduction isometric with ball between knees, x6 with 8-second hold  [x] TrA brace + straight leg raise (R&L), x10  [x] TrA brace + PFM squeeze + countertop push up, x10  [x] TrA  brace + PFM squeeze + wall push up, x10  [] Balance on foam pad + toe taps to step (R&L), x30  [] TrA brace + standing shoulder extension with green tubing, x20  [] Side-stepping with focus on upright posture with yellow band around knees, 2 minutes  [x] TrA brace + standing hip abduction (R&L), x10     Manual therapy techniques: to develop flexibility, desensitization, and extensibility for 0 minutes -  [] Manual release to pelvic floor muscles internally/vaginally: sphincter urethrovaginalis, compressor urethrae (B)      Home Exercises and Patient Education     Patient Education: progression of plan of care, plan for next session, pt prognosis, pelvic floor anatomy & function, relationship between TrA & PFM, etiology of urinary urgency, timeline for muscle changes with consistent strength training, neuroplasticity, pressure management for support of pelvic organ prolapse, and abdominal wall anatomy    Home Exercise Program (5/3/24): PFM squeeze, PFM squeeze + bridging, urge suppression, double void, wait at least 90 minutes between voids  Home Exercise Program Updates (5/9/24): none  HEP update (5/23/24): countertop push up  Updated HEP (6/6/24): bridging, hip adduction isometric, sit to stand    Exercises were reviewed, and Nelsy was able to demonstrate them prior to the end of the session, as needed. Nelsy demonstrated good understanding of the education provided.     Assessment     Pt tolerated treatment session well, with good understanding of education provided and improving coordination within session. Pt requires light verbal/manual cues to synch breath, TrA contraction, PFM contraction, and movement during exercises, but she improved with practice. Improving pelvic floor and abdominal wall strength with functional activities could improve pelvic pressure over time. Pt has progressed fairly well with PT and will continue to benefit from urge suppression and TrA/core strengthening. Progress in therapy limited by  extent of prolapse. Pt should be independent with updated HEP and is ready for discharge from therapy at this time.    Nelsy is progressing well towards her goals.   Pt prognosis: good  Pt's spiritual, cultural and educational needs considered and pt agreeable to plan of care and goals.  Anticipated barriers to physical therapy: none      Short Term Goals: 6 weeks   Pt to report urinary frequency of no more than every 1.5 hours to demonstrate improved bladder control - MET  Pt to be able to delay urination for at least 5 minutes with strong urge to demonstrate improved bladder control - PARTIALLY MET  Pt to verbalize urge suppression strategies for improved control over urgency and urge urinary incontinence - MET   Pt to be independent with introductory home exercise program - MET     Long Term Goals: 12 weeks   Pt to report nocturia of no more than 1-2 times per night for improved sleep quality - NOT MET  Pt to report urinary frequency of no more than every 2 hours to demonstrate improved bladder control - NOT MET  Pt to score no more than 4% impairment on the PFDI Prolapse FOTO survey to demonstrate reduced impairment due to prolapse - NOT MET  Pt to score no more than 20% impairment on the Urinary Problem FOTO survey to demonstrate reduced impairment due to pelvic floor dysfunction - NOT MET  Pt to be independent with advanced home exercise program  - NOT MET    Plan     Discharge from therapy      Kandace Duncan, PT, DPT

## 2024-06-06 NOTE — PATIENT INSTRUCTIONS
Bridging, 2-3 sets of 10  - Inhale to prepare, relax the belly and pelvic floor  - As you exhale, gently engage the transverse abdominis by drawing the belly button up and in towards the spine while also squeezing the pelvic floor  - Holding the belly button in, lift the hips (only lift as high as it is comfortable) and lower  - Inhale again to rest  *Don't hold your breath       Seated ball squeeze + kegel, 2-3 sets of 10 with 8-second hold  - Inhale to prepare, relax the belly and pelvic floor  - As you exhale, gently engage the transverse abdominis by drawing the belly button in towards the spine as well as lifting/squeezing the pelvic floor (Kegel) while also squeezing the ball (or you can use a pillow)  - Inhale again to rest  *Don't hold your breath       Sit to stand + kegel, 2-3 sets of 10  - Inhale to prepare, relax the belly and pelvic floor  - As you exhale, gently squeeze the pelvic floor  - Hold the pelvic floor in as you lift out of the chair (use arms if needed)  *Don't hold your breath  *Can use hands on thighs for support

## 2024-06-17 ENCOUNTER — ON-DEMAND VIRTUAL (OUTPATIENT)
Dept: URGENT CARE | Facility: CLINIC | Age: 79
End: 2024-06-17
Payer: MEDICARE

## 2024-06-17 DIAGNOSIS — B96.89 ACUTE BACTERIAL SINUSITIS: Primary | ICD-10-CM

## 2024-06-17 DIAGNOSIS — J01.90 ACUTE BACTERIAL SINUSITIS: Primary | ICD-10-CM

## 2024-06-17 PROCEDURE — 99203 OFFICE O/P NEW LOW 30 MIN: CPT | Mod: 95,,, | Performed by: NURSE PRACTITIONER

## 2024-06-17 RX ORDER — AMOXICILLIN AND CLAVULANATE POTASSIUM 875; 125 MG/1; MG/1
1 TABLET, FILM COATED ORAL 2 TIMES DAILY
Qty: 14 TABLET | Refills: 0 | Status: SHIPPED | OUTPATIENT
Start: 2024-06-17 | End: 2024-06-24

## 2024-06-17 NOTE — PROGRESS NOTES
Subjective:      Patient ID: Nelsy Mckenna is a 78 y.o. female.    Vitals:  vitals were not taken for this visit.     Chief Complaint: Sinus Problem      Visit Type: TELE AUDIOVISUAL    Present with the patient at the time of consultation: TELEMED PRESENT WITH PATIENT: None        Past Medical History:   Diagnosis Date    Cataract     Diverticulosis     Nephrolithiasis     Osteopenia     PCK (polycystic kidney disease)     Vitamin D deficiency      Past Surgical History:   Procedure Laterality Date     SECTION      twins    COLONOSCOPY N/A 2019    Procedure: COLONOSCOPY;  Surgeon: Keven Mckoy MD;  Location: [address];  Service: Endoscopy;  Laterality: N/A;    COSMETIC SURGERY      EYE SURGERY      FACIAL COSMETIC SURGERY      NASAL SEPTUM SURGERY      REFRACTIVE SURGERY Bilateral     Lasik    TUBAL LIGATION      w/      Review of patient's allergies indicates:  No Known Allergies  Current Outpatient Medications on File Prior to Visit   Medication Sig Dispense Refill    ALPRAZolam (XANAX) 0.25 MG tablet Take 1 tablet (0.25 mg total) by mouth 2 (two) times daily as needed for Anxiety. (Patient not taking: Reported on 2024) 20 tablet 0    cholecalciferol, vitamin D3, (VITAMIN D3) 50 mcg (2,000 unit) Cap capsule Take 1 capsule by mouth once daily.      estradioL (ESTRACE) 0.01 % (0.1 mg/gram) vaginal cream 0.5 grams with applicator or dime-sized amount with finger in vagina nightly x 2 weeks, then three times a week thereafter at bedtime. (Patient not taking: Reported on 2024) 42.5 g 3    fluorouraciL (EFUDEX) 5 % cream Apply topically. (Patient not taking: Reported on 2024)      JYNARQUE 45 mg (AM)/ 15 mg (PM) TbSQ Take by mouth.      losartan (COZAAR) 25 MG tablet Take 12.5 mg by mouth.      PROLIA 60 mg/mL Syrg Inject into the skin.      TOBRADEX ST 0.3-0.05 % DrpS Place 1 drop into the right eye 4 (four) times daily. (Patient not taking: Reported on 2024)        No current facility-administered medications on file prior to visit.     Family History   Problem Relation Name Age of Onset    Diabetes Mother Tammi Buck     Hypertension Mother Tammi Buck     Stroke Mother Tammi Buck     Heart disease Father Miko Buck          at 59 -  2/2 drowning - suspect cardiac cause that led to drowning    No Known Problems Sister Kaykay     No Known Problems Sister Adriana     Stroke Daughter Kandace         CVA - on ocps    Breast cancer Maternal Grandmother      No Known Problems Son Mathew     No Known Problems Son Julián     No Known Problems Son Avery     Blindness Neg Hx      Glaucoma Neg Hx      Macular degeneration Neg Hx      Retinal detachment Neg Hx         Medications Ordered                [id number] - [place], [place] - [address]   [address], [address], [facility] [address]    Telephone: [phone]   Fax: [phone]   Hours: Not open 24 hours                         E-Prescribed (1 of 1)              amoxicillin-clavulanate 875-125mg (AUGMENTIN) 875-125 mg per tablet    Sig: Take 1 tablet by mouth 2 (two) times daily. for 7 days       Start: 24     Quantity: 14 tablet Refills: 0                           Ohs Peq Odvv Intake    2024  2:59 PM CDT - Filed by Patient   What is your current physical address in the event of a medical emergency? [address], [place]   Are you able to take your vital signs? No   Please attach any relevant images or files          77 yo female with c/o pnd, cough, ear pressure and sinus pressure. She states symptoms for three weeks and affecting her sleep. She has not used anything for her symptoms. She states she has kidney disease so concerned about taking medication. She denies fever. Denies wheezing and sob.         Constitution: Negative for fever and generalized weakness.   HENT:  Positive for congestion and postnasal drip. Negative for sore throat.    Cardiovascular:  Negative for sob on  exertion.   Respiratory:  Positive for cough. Negative for sputum production, shortness of breath and wheezing.    Allergic/Immunologic: Negative for sneezing.   Neurological:  Negative for headaches.        Objective:   The physical exam was conducted virtually.  LOCATION OF PATIENT home  Physical Exam   Constitutional: She is oriented to person, place, and time. She appears well-developed.   HENT:   Head: Normocephalic and atraumatic.   Ears:   Right Ear: Hearing, tympanic membrane and external ear normal.   Left Ear: Hearing, tympanic membrane and external ear normal.   Nose: Congestion present.   Mouth/Throat: Uvula is midline, oropharynx is clear and moist and mucous membranes are normal.   Eyes: Conjunctivae and EOM are normal. Pupils are equal, round, and reactive to light.   Neck: Neck supple.   Cardiovascular: Normal rate.   Pulmonary/Chest: Effort normal and breath sounds normal.   Musculoskeletal: Normal range of motion.         General: Normal range of motion.   Neurological: She is alert and oriented to person, place, and time.   Skin: Skin is warm.   Psychiatric: Her behavior is normal. Thought content normal.   Nursing note and vitals reviewed.      Assessment:     1. Acute bacterial sinusitis        Plan:     -Below are suggestions for symptomatic relief:              -Tylenol every 4 hours OR ibuprofen every 6 hours as needed for pain/fever.              -Salt water gargles to soothe throat pain.              -Chloroseptic spray also helps to numb throat pain.              -Nasal saline spray reduces inflammation and dryness.              -Warm face compresses to help with facial sinus pain/pressure.              -Vicks vapor rub at night.              -Flonase OTC or Nasacort OTC for nasal congestion.              -Simple foods like chicken noodle soup.              -Delsym helps with coughing at night              -Zyrtec/Claritin during the day & Benadryl at night may help with allergies.     If  you DO NOT have Hypertension or any history of palpitations, it is ok to take over the counter Sudafed or Mucinex D or Allegra-D or Claritin-D or Zyrtec-D.  If you do take one of the above, it is ok to combine that with plain over the counter Mucinex or Allegra or Claritin or Zyrtec. If, for example, you are taking Zyrtec -D, you can combine that with Mucinex, but not Mucinex-D.  If you are taking Mucinex-D, you can combine that with plain Allegra or Claritin or Zyrtec.   If you DO have Hypertension or palpitations, it is safe to take Coricidin HBP for relief of sinus symptoms.     Please follow up with your Primary care provider within 2-5 days if your signs and symptoms have not resolved or worsen.      If your condition worsens or fails to improve we recommend that you receive another evaluation at the emergency room immediately or contact your primary medical clinic to discuss your concerns.   You must understand that you have received an Urgent Care treatment only and that you may be released before all of your medical problems are known or treated. You, the patient, will arrange for follow up care as instructed.      RED FLAGS/WARNING SYMPTOMS DISCUSSED WITH PATIENT THAT WOULD WARRANT EMERGENT MEDICAL ATTENTION. PATIENT VERBALIZED UNDERSTANDING.     Acute bacterial sinusitis  -     amoxicillin-clavulanate 875-125mg (AUGMENTIN) 875-125 mg per tablet; Take 1 tablet by mouth 2 (two) times daily. for 7 days  Dispense: 14 tablet; Refill: 0

## 2024-06-20 ENCOUNTER — OFFICE VISIT (OUTPATIENT)
Dept: URGENT CARE | Facility: CLINIC | Age: 79
End: 2024-06-20
Payer: MEDICARE

## 2024-06-20 VITALS
SYSTOLIC BLOOD PRESSURE: 114 MMHG | WEIGHT: 118 LBS | RESPIRATION RATE: 20 BRPM | DIASTOLIC BLOOD PRESSURE: 77 MMHG | HEART RATE: 68 BPM | BODY MASS INDEX: 20.91 KG/M2 | TEMPERATURE: 98 F | OXYGEN SATURATION: 98 % | HEIGHT: 63 IN

## 2024-06-20 DIAGNOSIS — R05.1 ACUTE COUGH: Primary | ICD-10-CM

## 2024-06-20 PROCEDURE — 71046 X-RAY EXAM CHEST 2 VIEWS: CPT | Mod: S$GLB,,, | Performed by: RADIOLOGY

## 2024-06-20 PROCEDURE — 99214 OFFICE O/P EST MOD 30 MIN: CPT | Mod: S$GLB,,, | Performed by: NURSE PRACTITIONER

## 2024-06-20 RX ORDER — FLUTICASONE PROPIONATE AND SALMETEROL 250; 50 UG/1; UG/1
1 POWDER RESPIRATORY (INHALATION) 2 TIMES DAILY
Qty: 60 EACH | Refills: 0 | Status: SHIPPED | OUTPATIENT
Start: 2024-06-20 | End: 2024-07-20

## 2024-06-20 RX ORDER — ALBUTEROL SULFATE 90 UG/1
2 AEROSOL, METERED RESPIRATORY (INHALATION) EVERY 6 HOURS PRN
Qty: 6.7 G | Refills: 0 | Status: SHIPPED | OUTPATIENT
Start: 2024-06-20 | End: 2024-06-30

## 2024-06-20 NOTE — PROGRESS NOTES
"Subjective:      Patient ID: Nelsy Mckenna is a 78 y.o. female.    Vitals:  height is 5' 3" (1.6 m) and weight is 53.5 kg (118 lb). Her temperature is 98.4 °F (36.9 °C). Her blood pressure is 114/77 and her pulse is 68. Her respiration is 20 and oxygen saturation is 98%.     Chief Complaint: Cough      Pt is a 77 yo female with hx polycystic kidney dz (CKD stage 4) presenting with congestion, right ear fullness, PND, and productive cough with sputum.  Onset of symptoms was 3-4 weeks ago.  Pt reports she was seen via telehealth 3 days ago and was prescribed Augmentin.  Pt states she has been taking antibiotics for about 3 days and states she is not feeling any better.     Cough  This is a new problem. The problem has been unchanged. The problem occurs every few minutes. The cough is Productive of sputum. Associated symptoms include ear pain (no pain, just right ear pressure). Pertinent negatives include no chest pain, chills, fever, headaches, myalgias, sore throat, shortness of breath or wheezing. Nothing aggravates the symptoms. Treatments tried: amoxicillin. The treatment provided no relief.       Constitution: Negative for chills, fatigue and fever.   HENT:  Positive for ear pain (no pain, just right ear pressure) and congestion. Negative for sinus pain and sore throat.    Cardiovascular:  Negative for chest pain.   Respiratory:  Positive for chest tightness, cough and sputum production. Negative for shortness of breath and wheezing.    Gastrointestinal:  Negative for nausea, vomiting and diarrhea.   Musculoskeletal:  Negative for muscle ache.   Neurological:  Negative for headaches.      Objective:     Physical Exam   Constitutional: She is oriented to person, place, and time.   HENT:   Head: Normocephalic.   Ears:   Right Ear: Hearing and external ear normal. No no drainage, swelling or tenderness. Tympanic membrane is retracted. Tympanic membrane is not erythematous and not bulging. No middle ear effusion.   Left " Ear: Hearing and external ear normal. No no drainage, swelling or tenderness. Tympanic membrane is not erythematous, not retracted and not bulging.  No middle ear effusion.   Nose: No mucosal edema, rhinorrhea or purulent discharge. Right sinus exhibits maxillary sinus tenderness. Right sinus exhibits no frontal sinus tenderness. Left sinus exhibits maxillary sinus tenderness. Left sinus exhibits no frontal sinus tenderness.   Mouth/Throat: Uvula is midline, oropharynx is clear and moist and mucous membranes are normal. No trismus in the jaw. No uvula swelling. No oropharyngeal exudate, posterior oropharyngeal edema or posterior oropharyngeal erythema.   Cardiovascular: Normal rate and regular rhythm.   Pulmonary/Chest: Effort normal. No accessory muscle usage or stridor. No respiratory distress. She has decreased breath sounds in the right lower field. She has wheezes in the right upper field, the right middle field and the right lower field. She has no rhonchi. She has no rales.   Neurological: She is alert and oriented to person, place, and time.   Skin: Skin is warm and dry.   Nursing note and vitals reviewed.      Assessment:     1. Acute cough      Pt is a 79 yo female with hx polycystic kidney dz (CKD stage 4) Last GFR 26 on 5/9/24. Pt c/o productive cough with thick yellow sputum and chest tightness (onset 3 weeks ago) that has been worsening.  Pt has been taking Augmentin for 3 days with no improvement.  PE shows wheezing and decreased breath sounds to right side.   CXR is normal but I still have concern for pneumonia.  Discussed case with collaborators.  Supportive meds will be given (RX inhalers and OTC mucinex). If symptoms persist after the weekend and completion of augmentin, then pt will f/u Monday.  Doxy may be considered at that time.    Plan:       Acute cough  -     X-Ray Chest PA And Lateral; Future; Expected date: 06/20/2024  -     albuterol (VENTOLIN HFA) 90 mcg/actuation inhaler; Inhale 2  "puffs into the lungs every 6 (six) hours as needed for Wheezing. Rescue  Dispense: 6.7 g; Refill: 0  -     fluticasone-salmeterol diskus inhaler 250-50 mcg; Inhale 1 puff into the lungs 2 (two) times daily. Controller for 60 doses  Dispense: 60 each; Refill: 0      Patient Instructions     Acute cough    -     X-Ray Chest PA And Lateral    -     albuterol (VENTOLIN HFA) 90 mcg/actuation inhaler; Inhale 2 puffs into the lungs every 6 (six) hours as needed for Wheezing. Rescue  Dispense: 6.7 g; Refill: 0    -     fluticasone-salmeterol diskus inhaler 250-50 mcg; Inhale 1 puff into the lungs 2 (two) times daily.       - Mucinex (guaifenesin) twice a day (or as directed) to help loosen         - Rest at home.     - Drink plenty of fluids so you won't get dehydrated.    Avoid taking Decongestants such as pseudoephedrine (ex. Sudafed) or phenylephrine (ex. Mucinex FastMax, Dayquil, Nyquil, or any combo cold meds that say "cold," "sinus" or "-D").      When to seek medical advice  Call your healthcare provider right away if any of these occur:  Fever that is poorly controlled with OTC fever reducing medication  New or worsening ear pain, sinus pain, or headache  Stiff neck  You can't swallow liquids or you can't open your mouth wide because of throat pain  Signs of dehydration. These include very dark urine or no urine, sunken eyes, and dizziness.  Trouble breathing or noisy breathing  Muffled voice  Rash     If your symptoms worsen or fail to improve you should go to Emergency Department.                   "

## 2024-06-20 NOTE — PATIENT INSTRUCTIONS
"  Acute cough    -     X-Ray Chest PA And Lateral    -     albuterol (VENTOLIN HFA) 90 mcg/actuation inhaler; Inhale 2 puffs into the lungs every 6 (six) hours as needed for Wheezing. Rescue  Dispense: 6.7 g; Refill: 0    -     fluticasone-salmeterol diskus inhaler 250-50 mcg; Inhale 1 puff into the lungs 2 (two) times daily.       - Mucinex (guaifenesin) twice a day (or as directed) to help loosen         - Rest at home.     - Drink plenty of fluids so you won't get dehydrated.    Avoid taking Decongestants such as pseudoephedrine (ex. Sudafed) or phenylephrine (ex. Mucinex FastMax, Dayquil, Nyquil, or any combo cold meds that say "cold," "sinus" or "-D").      When to seek medical advice  Call your healthcare provider right away if any of these occur:  Fever that is poorly controlled with OTC fever reducing medication  New or worsening ear pain, sinus pain, or headache  Stiff neck  You can't swallow liquids or you can't open your mouth wide because of throat pain  Signs of dehydration. These include very dark urine or no urine, sunken eyes, and dizziness.  Trouble breathing or noisy breathing  Muffled voice  Rash     If your symptoms worsen or fail to improve you should go to Emergency Department.     "

## 2024-08-16 ENCOUNTER — OFFICE VISIT (OUTPATIENT)
Dept: UROGYNECOLOGY | Facility: CLINIC | Age: 79
End: 2024-08-16
Payer: MEDICARE

## 2024-08-16 VITALS
HEART RATE: 69 BPM | BODY MASS INDEX: 21.09 KG/M2 | HEIGHT: 63 IN | WEIGHT: 119.06 LBS | DIASTOLIC BLOOD PRESSURE: 69 MMHG | SYSTOLIC BLOOD PRESSURE: 120 MMHG

## 2024-08-16 DIAGNOSIS — N39.41 URGE INCONTINENCE OF URINE: Primary | ICD-10-CM

## 2024-08-16 PROCEDURE — 99999 PR PBB SHADOW E&M-EST. PATIENT-LVL III: CPT | Mod: PBBFAC,,, | Performed by: OBSTETRICS & GYNECOLOGY

## 2024-08-16 RX ORDER — VIBEGRON 75 MG/1
75 TABLET, FILM COATED ORAL DAILY
Qty: 90 TABLET | Refills: 0 | Status: SHIPPED | OUTPATIENT
Start: 2024-08-16 | End: 2024-11-14

## 2024-08-16 NOTE — PROGRESS NOTES
Chief Complaint   Patient presents with    Follow-up        HPI: Patient is a 79 y.o. female  presents today for a follow up visit. She did about 6 PT visits and was taught some exercises. She states that she feels that the size of the bulge is better, less. She does not notice it as much as previously. May occasionally still feel it at the opening and notices that it is more apparent with some of the exercise poses when doing yoga.     No longer feels that something is going to fall out.     She reports that she has less urinary urgency since doing PT. She has been working on urge suppression. She is currently voiding about 2 hours and overnight voids every 3 hours. Still waking 2-3 times per night. She denies leakage of urine with an urge and coughing, sneezing or laughing.     REVIEW OF SYSTEMS:  A full 14-point ROS was performed and was significant for those  mentioned in the HPI.    The following portions of the patient's history were reviewed and updated as appropriate: allergies, current medications, past medical history, past surgical history and problem list.    PHYSICAL EXAMINATION    Vitals:    24 1040   BP: 120/69   Pulse: 69        General: Healthy in appearance, Well nourished, Affect Normal, NAD.      No visits with results within 1 Day(s) from this visit.   Latest known visit with results is:   Office Visit on 2024   Component Date Value Ref Range Status    Color, UA 2024 Yellow   Final    pH, UA 2024 7   Final    WBC, UA 2024 2+   Final    Nitrite, UA 2024 negative   Final    Protein, POC 2024 negative   Final    Glucose, UA 2024 normal   Final    Ketones, UA 2024 negative   Final    Urobilinogen, UA 2024 negative   Final    Bilirubin, POC 2024 negative   Final    Blood, UA 2024 negative   Final    Clarity, UA 2024 Clear   Final    Spec Grav UA 2024 1.005   Final    POC Residual Urine Volume 2024 47  0 -  100 mL Final    Urine Culture, Routine 04/12/2024 No significant growth   Final    RBC, UA 04/12/2024 1  0 - 4 /hpf Final    WBC, UA 04/12/2024 5  0 - 5 /hpf Final    Bacteria 04/12/2024 Rare  None-Occ /hpf Final    Squam Epithel, UA 04/12/2024 1  /hpf Final    Microscopic Comment 04/12/2024 SEE COMMENT   Final        ASSESSMENT & PLAN:    Urge incontinence of urine  -     vibegron (GEMTESA) 75 mg Tab; Take 1 tablet (75 mg total) by mouth once daily.  Dispense: 90 tablet; Refill: 0      78 yo with stage 2 incomplete uterovaginal prolapse and OAB/ urgency urinary incontinence presented today for a follow up visit. She has does pelvic dalila PT and maintains her HEP. Feeling better from the standpoint of her prolapse. Continues to have some urinary urgency, frequency, and nocturia although the urinary incontinence appears to be better. After reviewing the risks and benefits of medication she is interested in trying a beta 3 agonist and Gemtesa was prescribed. She will return in about 6 weeks for re-evaluation.     All questions were answered today. The patient was encouraged to contact the office as needed with any additional questions or concerns.     Total time spent on visit was 20 minutes.  This includes face to face time and non-face to face time preparing to see the patient (eg, review of tests), Obtaining and/or reviewing separately obtained history, Documenting clinical information in the electronic or other health record, Independently interpreting resultsand communicating results to the patient/family/caregiver, or Care coordination.    Emily Ramirez MD

## 2024-08-22 ENCOUNTER — OFFICE VISIT (OUTPATIENT)
Dept: INTERNAL MEDICINE | Facility: CLINIC | Age: 79
End: 2024-08-22
Attending: INTERNAL MEDICINE
Payer: MEDICARE

## 2024-08-22 VITALS
WEIGHT: 119.5 LBS | DIASTOLIC BLOOD PRESSURE: 66 MMHG | HEART RATE: 82 BPM | HEIGHT: 63 IN | BODY MASS INDEX: 21.17 KG/M2 | OXYGEN SATURATION: 98 % | SYSTOLIC BLOOD PRESSURE: 120 MMHG

## 2024-08-22 DIAGNOSIS — D64.9 ANEMIA, UNSPECIFIED: ICD-10-CM

## 2024-08-22 DIAGNOSIS — N39.41 URGENCY INCONTINENCE: ICD-10-CM

## 2024-08-22 DIAGNOSIS — K21.9 GERD WITHOUT ESOPHAGITIS: ICD-10-CM

## 2024-08-22 DIAGNOSIS — N18.4 CKD (CHRONIC KIDNEY DISEASE) STAGE 4, GFR 15-29 ML/MIN: Primary | ICD-10-CM

## 2024-08-22 DIAGNOSIS — E53.8 B12 DEFICIENCY: ICD-10-CM

## 2024-08-22 DIAGNOSIS — M47.816 OSTEOARTHRITIS OF LUMBAR SPINE, UNSPECIFIED SPINAL OSTEOARTHRITIS COMPLICATION STATUS: ICD-10-CM

## 2024-08-22 DIAGNOSIS — E53.8 FOLATE DEFICIENCY: ICD-10-CM

## 2024-08-22 DIAGNOSIS — M41.9 SCOLIOSIS, UNSPECIFIED SCOLIOSIS TYPE, UNSPECIFIED SPINAL REGION: ICD-10-CM

## 2024-08-22 DIAGNOSIS — E55.9 VITAMIN D DEFICIENCY: ICD-10-CM

## 2024-08-22 DIAGNOSIS — M81.0 OSTEOPOROSIS WITHOUT CURRENT PATHOLOGICAL FRACTURE, UNSPECIFIED OSTEOPOROSIS TYPE: ICD-10-CM

## 2024-08-22 DIAGNOSIS — N32.81 OAB (OVERACTIVE BLADDER): ICD-10-CM

## 2024-08-22 PROCEDURE — 1101F PT FALLS ASSESS-DOCD LE1/YR: CPT | Mod: CPTII,S$GLB,, | Performed by: INTERNAL MEDICINE

## 2024-08-22 PROCEDURE — 99999 PR PBB SHADOW E&M-EST. PATIENT-LVL III: CPT | Mod: PBBFAC,,, | Performed by: INTERNAL MEDICINE

## 2024-08-22 PROCEDURE — 1125F AMNT PAIN NOTED PAIN PRSNT: CPT | Mod: CPTII,S$GLB,, | Performed by: INTERNAL MEDICINE

## 2024-08-22 PROCEDURE — 3078F DIAST BP <80 MM HG: CPT | Mod: CPTII,S$GLB,, | Performed by: INTERNAL MEDICINE

## 2024-08-22 PROCEDURE — 3288F FALL RISK ASSESSMENT DOCD: CPT | Mod: CPTII,S$GLB,, | Performed by: INTERNAL MEDICINE

## 2024-08-22 PROCEDURE — 1160F RVW MEDS BY RX/DR IN RCRD: CPT | Mod: CPTII,S$GLB,, | Performed by: INTERNAL MEDICINE

## 2024-08-22 PROCEDURE — 99215 OFFICE O/P EST HI 40 MIN: CPT | Mod: S$GLB,,, | Performed by: INTERNAL MEDICINE

## 2024-08-22 PROCEDURE — 1159F MED LIST DOCD IN RCRD: CPT | Mod: CPTII,S$GLB,, | Performed by: INTERNAL MEDICINE

## 2024-08-22 PROCEDURE — 3074F SYST BP LT 130 MM HG: CPT | Mod: CPTII,S$GLB,, | Performed by: INTERNAL MEDICINE

## 2024-08-22 RX ORDER — LOSARTAN POTASSIUM 25 MG/1
12.5 TABLET ORAL DAILY
Qty: 45 TABLET | Refills: 3 | Status: SHIPPED | OUTPATIENT
Start: 2024-08-22 | End: 2025-08-22

## 2024-08-22 RX ORDER — FLUTICASONE PROPIONATE 50 MCG
1 SPRAY, SUSPENSION (ML) NASAL DAILY
Qty: 16 G | Refills: 12 | Status: SHIPPED | OUTPATIENT
Start: 2024-08-22 | End: 2024-09-21

## 2024-08-22 NOTE — PROGRESS NOTES
Normal more Subjective:   Patient ID: Nelsy Mckenna is a 79 y.o. female  Chief complaint:   No chief complaint on file.      HPI  Here for 6 month follow up     Since lcv had cataract extraction  Saw renal 8/16     stage 2 incomplete uterovaginal prolapse and OAB/ urgency urinary incontinence   Prescribed gemtessa at recent appt but did not start   Saw urogyn 8/2024 - attended pf PT and helpful     Back pain   Intermittent   - some days its severe and others it is mild   Made appt on Monday [facility]   Worse with bending   Achy   Does not take meds reg for this - attends yoga    Exercising   Yoga 2 days per week  1 day per week   Walking less in summer    Able to perform household chores without limitation     - reviewed recent labs through nephrology      Osteoporosis:  - nisha prolia injection   - tolerated well after renal ordered   - more consistent with vit d supplement   - eats yogurt - discussed adding calcium suppl  - exercising - yoga 2x/week, strength training 1x/wk - cardio when weather better  Previously:   - reviewed recent DEXA and indications to start medication  - she wanted to hold off on medication   - discussed role of meds and pot f/u with endo to discuss all options - she will let me know if would like to do so in future   - Less exercise over past year   - Stopped all supplements when renal function recently worsened   Prev:   - discussed c/f boniva/alendronate due to fluctuating kidney labs over the past year and discussed prolia   - she will continue with exercise and supplements and consider prolia later in year after starting tolvaptin in early 2022 - we also discussed that we can consider endo appt as well in future   - Dexa 2/2023    CKD4, PKD:  - as above  - followed by nephrology - Dr. Clark   - on tolvaptan and 1/2 tab losartan  Previously:    - had CT scan and had follow up in October 5, 2021 for WILLY on CKD3 - renal function improved on chart review and now stable   - inc to > 2  after ppi use and then improved as above   - CT showed PKD   - no longer on losartan 25mg but plan is to resume if bp increases    Hyperuricemia:   Inc fluid intake and no improvement   No hx of gout   Renal discussed role of allopurinol and she is hesitant to add another rx - will let either of us know if reconsiders     Nephrolithiasis: nonobstructive   Found incidentally on imaging - stone profile completed and to address inc fluids   - declines urocit K per renal     Lower back pain:   tried melthocarb and had appt to be asessed and was better   Yoga for exercise     Vit d def, hyperparathyroidism:   Takign vitd   Stay well hydrated     Mild anemia, folate def:   Resolved on recent labs 8/10/2023  Prev:   - no iron def at this time   - Seen by h/o 8/24/2021 for anemia   - cbcs stable     Vit d def:    - vit d   Prev:   Was taken off of all supplements due to sd - ok to resume   Previously:  Completed rx dose for 3 months but never started otc daily today - agrees to do so     GERD:   - improved and asymptomatic at this time with reducing triggers   Pepcid prn  Prev:  - stopped red wine   - taking pepcid prn and stable   In Past:   - reports acid reflux at time to post throat  - dependent on intake of food triggers like red sauce and red wine - taking ranitidine prn for sx and effective when needed   Drinking 1 cup coffee in am   No cp, sob, nasuea, diaphoresis vomiting or diarrhea or abd pian, weight loss     HM:  Tdap due   Covid   flu    H/o: fermin - anemia  Renal: theresa 2/3/2023  Ophthal: julianne   Opt: Rebeka   Ortho: [facility]    Review of Systems   Constitutional:  Negative for activity change and unexpected weight change.   HENT:  Negative for hearing loss, rhinorrhea and trouble swallowing.    Eyes:  Negative for discharge and visual disturbance.   Respiratory:  Negative for chest tightness and wheezing.    Cardiovascular:  Negative for chest pain and palpitations.   Gastrointestinal:  Negative for blood in stool,  "constipation, diarrhea and vomiting.   Endocrine: Negative for polydipsia and polyuria.   Genitourinary:  Negative for difficulty urinating, dysuria, hematuria and menstrual problem.   Musculoskeletal:  Negative for arthralgias, joint swelling and neck pain.   Neurological:  Negative for weakness and headaches.   Psychiatric/Behavioral:  Negative for confusion and dysphoric mood.      Objective:  Vitals:    08/22/24 1256   BP: 120/66   BP Location: Left arm   Patient Position: Sitting   Pulse: 82   SpO2: 98%   Weight: 54.2 kg (119 lb 7.8 oz)   Height: 5' 3" (1.6 m)       Body mass index is 21.17 kg/m².    Physical Exam  Vitals reviewed.   Constitutional:       Appearance: Normal appearance. She is well-developed.   HENT:      Head: Normocephalic and atraumatic.   Eyes:      Extraocular Movements: Extraocular movements intact.      Conjunctiva/sclera: Conjunctivae normal.   Cardiovascular:      Rate and Rhythm: Normal rate and regular rhythm.      Pulses: Normal pulses.      Heart sounds: Normal heart sounds.   Pulmonary:      Effort: Pulmonary effort is normal.      Breath sounds: Normal breath sounds.   Abdominal:      General: Bowel sounds are normal.      Palpations: Abdomen is soft.   Musculoskeletal:         General: No swelling or tenderness.      Cervical back: Normal range of motion and neck supple.   Skin:     General: Skin is warm and dry.      Capillary Refill: Capillary refill takes less than 2 seconds.      Nails: There is no clubbing.   Neurological:      General: No focal deficit present.      Mental Status: She is alert and oriented to person, place, and time.      Gait: Gait normal.   Psychiatric:         Speech: Speech normal.         Behavior: Behavior normal.         Thought Content: Thought content normal.       Assessment:  1. CKD (chronic kidney disease) stage 4, GFR 15-29 ml/min    2. Osteoporosis without current pathological fracture, unspecified osteoporosis type    3. OAB (overactive " bladder)    4. Urgency incontinence    5. Scoliosis, unspecified scoliosis type, unspecified spinal region    6. Osteoarthritis of lumbar spine, unspecified spinal osteoarthritis complication status    7. Vitamin D deficiency    8. GERD without esophagitis    9. Folate deficiency        Plan:  CKD (chronic kidney disease) stage 4, GFR 15-29 ml/min  -     losartan (COZAAR) 25 MG tablet; Take 0.5 tablets (12.5 mg total) by mouth once daily.  Dispense: 45 tablet; Refill: 3  -     fluticasone propionate (FLONASE) 50 mcg/actuation nasal spray; 1 spray (50 mcg total) by Each Nostril route once daily.  Dispense: 16 g; Refill: 12  -     CBC Auto Differential; Future; Expected date: 08/22/2024  -     TSH; Future; Expected date: 08/22/2024  -     Lipid Panel; Future; Expected date: 08/22/2024  Stable cont renal f/u   Update labs   Cont regimen   Avoid nsaids     Osteoporosis without current pathological fracture, unspecified osteoporosis type  -     TSH; Future; Expected date: 08/22/2024  -     Lipid Panel; Future; Expected date: 08/22/2024  Cont prolia   Cont ca and vit d   Exercise   Dexa q2y    OAB (overactive bladder)  Stable   Cont PT exercises   Cont f/u with urogyn     Urgency incontinence  As above     Scoliosis, unspecified scoliosis type, unspecified spinal region  F/u with ortho   Stable   Avoid nsaids   Apap arthritis prn     Osteoarthritis of lumbar spine, unspecified spinal osteoarthritis complication status  As above     Vitamin D deficiency  Stabl e  Con tsuppl     GERD without esophagitis  Stable   Cont diet restrictions    Folate deficiency  -     CBC Auto Differential; Future; Expected date: 08/22/2024  -     FOLATE; Future; Expected date: 08/22/2024  Update labs     B12 deficiency  -     Vitamin B12; Future; Expected date: 08/22/2024  -     METHYLMALONIC ACID, SERUM; Future; Expected date: 08/22/2024  Update labs     Anemia, unspecified  -     TSH; Future; Expected date: 08/22/2024  Stable   monitor   Seen  by h/o     50 min spent in care of patient including chart review, history, orders, physical, orders and coordination of care      F/u with specialists     Health Maintenance   Topic Date Due    TETANUS VACCINE  07/12/2023    DEXA Scan  03/29/2026    Lipid Panel  03/29/2028    Hepatitis C Screening  Completed    Shingles Vaccine  Completed

## 2024-10-09 ENCOUNTER — OFFICE VISIT (OUTPATIENT)
Dept: UROGYNECOLOGY | Facility: CLINIC | Age: 79
End: 2024-10-09
Payer: MEDICARE

## 2024-10-09 VITALS
BODY MASS INDEX: 21.45 KG/M2 | WEIGHT: 121.06 LBS | HEIGHT: 63 IN | HEART RATE: 70 BPM | DIASTOLIC BLOOD PRESSURE: 75 MMHG | SYSTOLIC BLOOD PRESSURE: 144 MMHG

## 2024-10-09 DIAGNOSIS — R35.0 URINARY FREQUENCY: ICD-10-CM

## 2024-10-09 DIAGNOSIS — R39.15 URINARY URGENCY: Primary | ICD-10-CM

## 2024-10-09 DIAGNOSIS — N81.2 INCOMPLETE UTEROVAGINAL PROLAPSE: ICD-10-CM

## 2024-10-09 PROCEDURE — 3288F FALL RISK ASSESSMENT DOCD: CPT | Mod: CPTII,S$GLB,, | Performed by: OBSTETRICS & GYNECOLOGY

## 2024-10-09 PROCEDURE — 1101F PT FALLS ASSESS-DOCD LE1/YR: CPT | Mod: CPTII,S$GLB,, | Performed by: OBSTETRICS & GYNECOLOGY

## 2024-10-09 PROCEDURE — 1126F AMNT PAIN NOTED NONE PRSNT: CPT | Mod: CPTII,S$GLB,, | Performed by: OBSTETRICS & GYNECOLOGY

## 2024-10-09 PROCEDURE — 3078F DIAST BP <80 MM HG: CPT | Mod: CPTII,S$GLB,, | Performed by: OBSTETRICS & GYNECOLOGY

## 2024-10-09 PROCEDURE — 99999 PR PBB SHADOW E&M-EST. PATIENT-LVL III: CPT | Mod: PBBFAC,,, | Performed by: OBSTETRICS & GYNECOLOGY

## 2024-10-09 PROCEDURE — 1160F RVW MEDS BY RX/DR IN RCRD: CPT | Mod: CPTII,S$GLB,, | Performed by: OBSTETRICS & GYNECOLOGY

## 2024-10-09 PROCEDURE — 1159F MED LIST DOCD IN RCRD: CPT | Mod: CPTII,S$GLB,, | Performed by: OBSTETRICS & GYNECOLOGY

## 2024-10-09 PROCEDURE — 99213 OFFICE O/P EST LOW 20 MIN: CPT | Mod: S$GLB,,, | Performed by: OBSTETRICS & GYNECOLOGY

## 2024-10-09 PROCEDURE — 3077F SYST BP >= 140 MM HG: CPT | Mod: CPTII,S$GLB,, | Performed by: OBSTETRICS & GYNECOLOGY

## 2024-10-09 NOTE — PROGRESS NOTES
Chief Complaint   Patient presents with    Follow-up        HPI: Patient is a 79 y.o. female  presents today for a follow up visit.     Reports that she filled the medication but then read the package insert which said to not take it with kidney disease as she has advanced kidney disease.     She is still doing her HEP. Feels like her prolapse has gotten better and does not feel like it is as far down. Does not feel like it is going to fall out anymore. She sometimes notices a bulge when she does certain yoga positions but she is not afraid of it.     She reports that she is more bothered by the urge to void without feeling like she has to rush to get there. She is currently voiding about 1.5 - 2 hours and overnight waking 2-3 times per night. She denies leakage of urine with an urge and coughing, sneezing or laughing.     REVIEW OF SYSTEMS:  A full 14-point ROS was performed and was significant for those  mentioned in the HPI.    The following portions of the patient's history were reviewed and updated as appropriate: allergies, current medications, past medical history, past surgical history and problem list.    PHYSICAL EXAMINATION    Vitals:    10/09/24 1132   BP: (!) 144/75   Pulse: 70        General: Healthy in appearance, Well nourished, Affect Normal, NAD.      No visits with results within 1 Day(s) from this visit.   Latest known visit with results is:   Office Visit on 2024   Component Date Value Ref Range Status    Color, UA 2024 Yellow   Final    pH, UA 2024 7   Final    WBC, UA 2024 2+   Final    Nitrite, UA 2024 negative   Final    Protein, POC 2024 negative   Final    Glucose, UA 2024 normal   Final    Ketones, UA 2024 negative   Final    Urobilinogen, UA 2024 negative   Final    Bilirubin, POC 2024 negative   Final    Blood, UA 2024 negative   Final    Clarity, UA 2024 Clear   Final    Spec Grav UA 2024 1.005   Final     POC Residual Urine Volume 04/12/2024 47  0 - 100 mL Final    Urine Culture, Routine 04/12/2024 No significant growth   Final    RBC, UA 04/12/2024 1  0 - 4 /hpf Final    WBC, UA 04/12/2024 5  0 - 5 /hpf Final    Bacteria 04/12/2024 Rare  None-Occ /hpf Final    Squam Epithel, UA 04/12/2024 1  /hpf Final    Microscopic Comment 04/12/2024 SEE COMMENT   Final        ASSESSMENT & PLAN:    Urinary urgency    Urinary frequency    Incomplete uterovaginal prolapse        80 yo with stage 4 CKD, stage 2 incomplete uterovaginal prolapse and OAB/ urgency urinary incontinence presented today for a follow up visit.   We discussed that Gemtesa is processed in the kidneys so that with advanced disease it is possible for her to retain a higher dose effect with a lower kidney function but that I was not aware of it affecting renal function and making renal disease worse. Suggested that she speak with her nephrologist , Dr. Clark with whom she has an appointment in November if she felt that it would be ok to take and if so we can cut the tablet in half so that she takes 1/2 the recommended dose. Reviewed that cutting the tablet would not affect the pharmacology of the medication.     She will send me a Piggybackr message if she decides to start the medication and she will return in January for re-evaluation and for an exam to measure her prolapse.   All questions were answered today. The patient was encouraged to contact the office as needed with any additional questions or concerns.     Total time spent on visit was 20 minutes.  This includes face to face time and non-face to face time preparing to see the patient (eg, review of tests), Obtaining and/or reviewing separately obtained history, Documenting clinical information in the electronic or other health record, Independently interpreting resultsand communicating results to the patient/family/caregiver, or Care coordination.    Emily Ramirez MD

## 2024-11-11 DIAGNOSIS — N39.41 URGE INCONTINENCE OF URINE: ICD-10-CM

## 2024-11-11 NOTE — TELEPHONE ENCOUNTER
Refill Routing Note   Medication(s) are not appropriate for processing by [facility] for the following reason(s):        Required vitals abnormal: 144/75     [facility] action(s):  Defer             Appointments  past 12m or future 3m with PCP    Date Provider   Last Visit   10/9/2024 Emily Ramirez MD   Next Visit   1/9/2025 Emily Ramirez MD   ED visits in past 90 days: 0        Note composed:2:48 PM 11/11/2024

## 2024-11-12 RX ORDER — VIBEGRON 75 MG/1
1 TABLET, FILM COATED ORAL
Qty: 90 TABLET | Refills: 0 | Status: SHIPPED | OUTPATIENT
Start: 2024-11-12

## 2025-01-09 ENCOUNTER — OFFICE VISIT (OUTPATIENT)
Dept: UROGYNECOLOGY | Facility: CLINIC | Age: 80
End: 2025-01-09
Payer: MEDICARE

## 2025-01-09 VITALS
WEIGHT: 119.25 LBS | DIASTOLIC BLOOD PRESSURE: 63 MMHG | BODY MASS INDEX: 21.13 KG/M2 | HEIGHT: 63 IN | SYSTOLIC BLOOD PRESSURE: 137 MMHG | HEART RATE: 75 BPM

## 2025-01-09 DIAGNOSIS — N39.41 URGE INCONTINENCE OF URINE: Primary | ICD-10-CM

## 2025-01-09 DIAGNOSIS — R39.15 URINARY URGENCY: ICD-10-CM

## 2025-01-09 DIAGNOSIS — N81.2 INCOMPLETE UTEROVAGINAL PROLAPSE: ICD-10-CM

## 2025-01-09 PROCEDURE — 99999 PR PBB SHADOW E&M-EST. PATIENT-LVL III: CPT | Mod: PBBFAC,,, | Performed by: OBSTETRICS & GYNECOLOGY

## 2025-01-09 NOTE — PROGRESS NOTES
Chief Complaint   Patient presents with    Follow-up        HPI: Patient is a 79 y.o. female  presents today for a follow up visit.   Patient reports that she is taking 1/2 tablet daily. She saw her nephrologist who was ok with it. She states that she is not having any side effects.   She is having a hard time determining if the 1/2 tablets is helping but thinks that it may be helping.     She continues to void about every 1.5 hours and wakes 2 times per night. She denies urge associated loss of urine or with a cough, sneeze or laugh.     She does notice with a cough that she has an increase in prolapse.     She has some bothersome symptoms of prolapse.     She is still doing her HEP. Feels like her prolapse has gotten better and does not feel like it is as far down. Does not feel like it is going to fall out anymore. She sometimes notices a bulge when she does certain yoga positions but she is not afraid of it.     REVIEW OF SYSTEMS:  A full 14-point ROS was performed and was significant for those  mentioned in the HPI.    The following portions of the patient's history were reviewed and updated as appropriate: allergies, current medications, past medical history, past surgical history and problem list.    PHYSICAL EXAMINATION    Vitals:    25 1049   BP: 137/63   Pulse: 75          General: Healthy in appearance, Well nourished, Affect Normal, NAD.      No visits with results within 1 Day(s) from this visit.   Latest known visit with results is:   Office Visit on 2024   Component Date Value Ref Range Status    Color, UA 2024 Yellow   Final    pH, UA 2024 7   Final    WBC, UA 2024 2+   Final    Nitrite, UA 2024 negative   Final    Protein, POC 2024 negative   Final    Glucose, UA 2024 normal   Final    Ketones, UA 2024 negative   Final    Urobilinogen, UA 2024 negative   Final    Bilirubin, POC 2024 negative   Final    Blood, UA 2024  negative   Final    Clarity, UA 04/12/2024 Clear   Final    Spec Grav UA 04/12/2024 1.005   Final    POC Residual Urine Volume 04/12/2024 47  0 - 100 mL Final    Urine Culture, Routine 04/12/2024 No significant growth   Final    RBC, UA 04/12/2024 1  0 - 4 /hpf Final    WBC, UA 04/12/2024 5  0 - 5 /hpf Final    Bacteria 04/12/2024 Rare  None-Occ /hpf Final    Squam Epithel, UA 04/12/2024 1  /hpf Final    Microscopic Comment 04/12/2024 SEE COMMENT   Final        ASSESSMENT & PLAN:    Urge incontinence of urine    Urinary urgency    Incomplete uterovaginal prolapse      78 yo with stage 4 CKD, stage 2 incomplete uterovaginal prolapse and OAB/ urgency urinary incontinence presented today for a follow up visit.     Patient is not seeing any change in symptms with 1/2 dose of the Gemtesa but also not having any side effects. Her nephrologist was ok with her taking the medication and she feels comfortable increasing the dose. Will plan to take the full strength dose for the next 6 weeks and return for a virtual visit to follow up regarding symptoms.   Discussed that advanced treatment would include botox, sacral neuromodulation or percutaneous tibial nerve stimulation.     Patient does not desire surgery for her prolapse. We discussed the option of a pessary. She was concerned about pain and we reviewed that if it is a good fit she should not be aware of the pessary although sometimes with removal of the pessary for cleanig/ exams it can be painful.     Total time spent on visit was 20 minutes.  This includes face to face time and non-face to face time preparing to see the patient (eg, review of tests), Obtaining and/or reviewing separately obtained history, Documenting clinical information in the electronic or other health record, Independently interpreting resultsand communicating results to the patient/family/caregiver, or Care coordination.    Emily Ramirez MD

## 2025-02-18 ENCOUNTER — HOSPITAL ENCOUNTER (OUTPATIENT)
Dept: RADIOLOGY | Facility: OTHER | Age: 80
Discharge: HOME | End: 2025-02-18
Attending: ORTHOPAEDIC SURGERY
Payer: MEDICARE

## 2025-02-18 DIAGNOSIS — M54.50 LOW BACK PAIN, UNSPECIFIED: ICD-10-CM

## 2025-02-18 PROCEDURE — 72148 MRI LUMBAR SPINE W/O DYE: CPT | Mod: TC

## 2025-02-20 ENCOUNTER — OFFICE VISIT (OUTPATIENT)
Dept: INTERNAL MEDICINE | Facility: CLINIC | Age: 80
End: 2025-02-20
Attending: INTERNAL MEDICINE
Payer: MEDICARE

## 2025-02-20 DIAGNOSIS — K21.9 GERD WITHOUT ESOPHAGITIS: ICD-10-CM

## 2025-02-20 DIAGNOSIS — Q61.3 PKD (POLYCYSTIC KIDNEY DISEASE): ICD-10-CM

## 2025-02-20 DIAGNOSIS — E53.8 FOLATE DEFICIENCY: ICD-10-CM

## 2025-02-20 DIAGNOSIS — M81.8 OTHER OSTEOPOROSIS, UNSPECIFIED PATHOLOGICAL FRACTURE PRESENCE: Primary | ICD-10-CM

## 2025-02-20 DIAGNOSIS — M47.26 OSTEOARTHRITIS OF SPINE WITH RADICULOPATHY, LUMBAR REGION: ICD-10-CM

## 2025-02-20 DIAGNOSIS — E55.9 VITAMIN D DEFICIENCY: ICD-10-CM

## 2025-02-20 DIAGNOSIS — E53.8 B12 DEFICIENCY: ICD-10-CM

## 2025-02-20 DIAGNOSIS — E21.3 HYPERPARATHYROIDISM: ICD-10-CM

## 2025-02-20 DIAGNOSIS — M41.9 SCOLIOSIS OF THORACOLUMBAR SPINE, UNSPECIFIED SCOLIOSIS TYPE: ICD-10-CM

## 2025-02-20 DIAGNOSIS — M62.838 MUSCLE SPASM: ICD-10-CM

## 2025-02-20 DIAGNOSIS — D64.9 ANEMIA, UNSPECIFIED TYPE: ICD-10-CM

## 2025-02-20 DIAGNOSIS — N18.4 CKD (CHRONIC KIDNEY DISEASE) STAGE 4, GFR 15-29 ML/MIN: ICD-10-CM

## 2025-02-20 DIAGNOSIS — Z12.31 ENCOUNTER FOR SCREENING MAMMOGRAM FOR MALIGNANT NEOPLASM OF BREAST: ICD-10-CM

## 2025-02-20 PROCEDURE — 3074F SYST BP LT 130 MM HG: CPT | Mod: CPTII,S$GLB,, | Performed by: INTERNAL MEDICINE

## 2025-02-20 PROCEDURE — 3079F DIAST BP 80-89 MM HG: CPT | Mod: CPTII,S$GLB,, | Performed by: INTERNAL MEDICINE

## 2025-02-20 PROCEDURE — G2211 COMPLEX E/M VISIT ADD ON: HCPCS | Mod: S$GLB,,, | Performed by: INTERNAL MEDICINE

## 2025-02-20 PROCEDURE — 99999 PR PBB SHADOW E&M-EST. PATIENT-LVL IV: CPT | Mod: PBBFAC,,, | Performed by: INTERNAL MEDICINE

## 2025-02-20 PROCEDURE — 1160F RVW MEDS BY RX/DR IN RCRD: CPT | Mod: CPTII,S$GLB,, | Performed by: INTERNAL MEDICINE

## 2025-02-20 PROCEDURE — 1101F PT FALLS ASSESS-DOCD LE1/YR: CPT | Mod: CPTII,S$GLB,, | Performed by: INTERNAL MEDICINE

## 2025-02-20 PROCEDURE — 1125F AMNT PAIN NOTED PAIN PRSNT: CPT | Mod: CPTII,S$GLB,, | Performed by: INTERNAL MEDICINE

## 2025-02-20 PROCEDURE — 3288F FALL RISK ASSESSMENT DOCD: CPT | Mod: CPTII,S$GLB,, | Performed by: INTERNAL MEDICINE

## 2025-02-20 PROCEDURE — 1159F MED LIST DOCD IN RCRD: CPT | Mod: CPTII,S$GLB,, | Performed by: INTERNAL MEDICINE

## 2025-02-20 PROCEDURE — 99215 OFFICE O/P EST HI 40 MIN: CPT | Mod: S$GLB,,, | Performed by: INTERNAL MEDICINE

## 2025-02-20 RX ORDER — METHOCARBAMOL 500 MG/1
500 TABLET, FILM COATED ORAL 3 TIMES DAILY PRN
Qty: 30 TABLET | Refills: 1 | Status: SHIPPED | OUTPATIENT
Start: 2025-02-20 | End: 2025-03-02

## 2025-02-20 NOTE — PROGRESS NOTES
Normal more Subjective:   Patient ID: Nelsy Mckenna is a 79 y.o. female  Chief complaint:   Chief Complaint   Patient presents with    Follow-up     6 month follow up     HPI  Here for 6 month follow up   Taking miv, folic acid, vit d     Saw renal yesterday  Saw [facility] in Jan - inc gemtessa     S/p cataract extraction    - reviewed recent labs through nephrology      stage 2 incomplete uterovaginal prolapse and OAB/ urgency urinary incontinence   Prescribed gemtessa and mildly helpful if at all - sx stable  Saw urogyn 8/2024 - attended pf PT and helpful     Back pain:   Saw Dr. Hernandez at [facility] - Tuesday mri of lumbar spine completed - reviewed today iwht pt   As above    Intermittent   - some days its severe and others it is mild   Made appt on Monday [facility]   Worse with bending   Achy   Does not take meds reg for this - attends yoga  Prev::   tried melthocarb and had appt to be asessed and was better   Yoga for exercise     Exercising   Yoga 2 days per week   Prev:  1 day per week   Walking intermittently  Prev:   Able to perform household chores without limitation     Osteoporosis:  - nisha prolia injection   - tolerated well after renal ordered   - consistent with vit d supplement   - eats yogurt - discussed adding calcium suppl   - exercising - yoga 2x/week, strength training 1x/wk - cardio when weather better  Previously:   - reviewed recent DEXA and indications to start medication  - she wanted to hold off on medication   - discussed role of meds and pot f/u with endo to discuss all options - she will let me know if would like to do so in future   - Less exercise over past year   - Stopped all supplements when renal function recently worsened   Prev:   - discussed c/f boniva/alendronate due to fluctuating kidney labs over the past year and discussed prolia   - she will continue with exercise and supplements and consider prolia later in year after starting tolvaptin in early 2022 - we also  discussed that we can consider endo appt as well in future   - Dexa 2/2023    CKD4, PKD:  - as above - nisha injection - last was yesterday   - followed by nephrology - Dr. Clark   - on tolvaptan and 1/2 tab losartan  Previously:    - had CT scan and had follow up in October 5, 2021 for WILLY on CKD3 - renal function improved on chart review and now stable   - inc to > 2 after ppi use and then improved as above   - CT showed PKD   - no longer on losartan 25mg but plan is to resume if bp increases    Hyperuricemia:   Inc fluid intake and no improvement   No hx of gout   Renal discussed role of allopurinol and she is hesitant to add another rx - will let either of us know if reconsiders     Nephrolithiasis: nonobstructive   Found incidentally on imaging - stone profile completed and to address inc fluids   - declines urocit K per renal     Vit d def, hyperparathyroidism:   Takign vitd   Stay well hydrated   Vit d def:    - vit d   Prev:   Was taken off of all supplements due to willy - ok to resume   Previously:  Completed rx dose for 3 months but never started otc daily today - agrees to do so     Mild anemia, folate def:   Monitored - reviewed recent labs and rec to check additional levels  Prev:   - no iron def at this time   - Seen by h/o 8/24/2021 for anemia   - cbcs stable     GERD:   - improved and asymptomatic at this time with reducing triggers   Pepcid prn  Prev:  - stopped red wine   - taking pepcid prn and stable   In Past:   - reports acid reflux at time to post throat  - dependent on intake of food triggers like red sauce and red wine - taking ranitidine prn for sx and effective when needed   Drinking 1 cup coffee in am   No cp, sob, nasuea, diaphoresis vomiting or diarrhea or abd pian, weight loss     HM:  Tdap due   Dexa due 3/2025  Mmg scheduled    H/o: zander - anemia  Renal: theresa 2/3/2023  Ophthal: julianne   Opt: Rebeka   Ortho: David    Review of Systems   Constitutional:  Negative for activity change and  "unexpected weight change.   HENT:  Negative for hearing loss, rhinorrhea and trouble swallowing.    Eyes:  Negative for discharge and visual disturbance.   Respiratory:  Negative for chest tightness and wheezing.    Cardiovascular:  Negative for chest pain and palpitations.   Gastrointestinal:  Negative for blood in stool, constipation, diarrhea and vomiting.   Endocrine: Negative for polydipsia and polyuria.   Genitourinary:  Negative for difficulty urinating, dysuria, hematuria and menstrual problem.   Musculoskeletal:  Negative for arthralgias, joint swelling and neck pain.   Neurological:  Negative for weakness and headaches.   Psychiatric/Behavioral:  Negative for confusion and dysphoric mood.      Objective:    Vitals:    02/20/25 1329 02/20/25 1400   BP: (!) 140/86 128/82   BP Location: Left arm    Patient Position: Sitting    Pulse: 81    SpO2: 96%    Weight: 53.9 kg (118 lb 13.3 oz)    Height: 5' 3" (1.6 m)        Body mass index is 21.05 kg/m².    Physical Exam  Vitals reviewed.   Constitutional:       Appearance: Normal appearance. She is well-developed.   HENT:      Head: Normocephalic and atraumatic.      Right Ear: Tympanic membrane, ear canal and external ear normal.      Left Ear: Tympanic membrane, ear canal and external ear normal.      Nose: Nose normal. No congestion.      Mouth/Throat:      Mouth: Mucous membranes are moist.      Pharynx: Oropharynx is clear. No oropharyngeal exudate.   Eyes:      Extraocular Movements: Extraocular movements intact.      Conjunctiva/sclera: Conjunctivae normal.   Neck:      Thyroid: No thyromegaly.   Cardiovascular:      Rate and Rhythm: Normal rate and regular rhythm.      Pulses: Normal pulses.      Heart sounds: Normal heart sounds.   Pulmonary:      Effort: Pulmonary effort is normal.      Breath sounds: Normal breath sounds.   Abdominal:      General: Bowel sounds are normal.      Palpations: Abdomen is soft.   Musculoskeletal:         General: No swelling " or tenderness.      Cervical back: Normal range of motion and neck supple.   Lymphadenopathy:      Cervical: No cervical adenopathy.   Skin:     General: Skin is warm and dry.      Capillary Refill: Capillary refill takes less than 2 seconds.      Nails: There is no clubbing.   Neurological:      General: No focal deficit present.      Mental Status: She is alert and oriented to person, place, and time. Mental status is at baseline.      Gait: Gait normal.   Psychiatric:         Speech: Speech normal.         Behavior: Behavior normal.         Thought Content: Thought content normal.       Assessment:  1. Other osteoporosis, unspecified pathological fracture presence    2. CKD (chronic kidney disease) stage 4, GFR 15-29 ml/min    3. Anemia, unspecified type    4. Muscle spasm    5. Encounter for screening mammogram for malignant neoplasm of breast    6. B12 deficiency    7. Folate deficiency    8. Osteoarthritis of spine with radiculopathy, lumbar region    9. Scoliosis of thoracolumbar spine, unspecified scoliosis type    10. Hyperparathyroidism    11. PKD (polycystic kidney disease)    12. GERD without esophagitis    13. Vitamin D deficiency      Plan:  Nelsy was seen today for follow-up.    Diagnoses and all orders for this visit:    Other osteoporosis, unspecified pathological fracture presence  -     DXA Bone Density Axial Skeleton 1 or more sites; Future    CKD (chronic kidney disease) stage 4, GFR 15-29 ml/min    Anemia, unspecified type  -     FERRITIN; Future  -     IRON AND TIBC; Future    Muscle spasm  -     methocarbamoL (ROBAXIN) 500 MG Tab; Take 1 tablet (500 mg total) by mouth 3 (three) times daily as needed (muscle spasm).    Encounter for screening mammogram for malignant neoplasm of breast  -     Mammo Digital Screening Bilat w/ Panchito (XPD); Future    B12 deficiency  -     Vitamin B12; Future    Folate deficiency  -     Vitamin B12; Future    Osteoarthritis of spine with radiculopathy, lumbar  region    Scoliosis of thoracolumbar spine, unspecified scoliosis type    Hyperparathyroidism    PKD (polycystic kidney disease)    GERD without esophagitis    Vitamin D deficiency    Reviewed recent labs and mri     Recommend daily sunscreen, cardiovascular exercise min 30 min 5 days per week. Seatbelts routinely.  Reviewed recommended health maintenance and recommended vaccines   Check/reviewed appropriate labs     Continue current regimen   F/u with specialists   Check addititonal labs     Schedule dexa and mmg     48 min spent in care of patient including chart review, history, orders, physical, orders and coordination of care  Visit today included increased complexity associated with the care of the episodic problem back pain, oa of spine, osteoporosis addressed and managing the longitudinal care of the patient due to the serious and/or complex managed problem(s) ckd4.      Health Maintenance   Topic Date Due    TETANUS VACCINE  07/12/2023    DEXA Scan  03/29/2026    Lipid Panel  03/29/2028    Hepatitis C Screening  Completed    Shingles Vaccine  Completed    Influenza Vaccine  Completed    COVID-19 Vaccine  Completed    RSV Vaccine (Age 60+ and Pregnant patients)  Completed    Pneumococcal Vaccines (Age 50+)  Completed

## 2025-02-21 ENCOUNTER — PATIENT MESSAGE (OUTPATIENT)
Dept: INTERNAL MEDICINE | Facility: CLINIC | Age: 80
End: 2025-02-21
Payer: MEDICARE

## 2025-02-21 ENCOUNTER — OFFICE VISIT (OUTPATIENT)
Dept: UROGYNECOLOGY | Facility: CLINIC | Age: 80
End: 2025-02-21
Payer: MEDICARE

## 2025-02-21 DIAGNOSIS — R39.15 URINARY URGENCY: ICD-10-CM

## 2025-02-21 DIAGNOSIS — E53.8 FOLIC ACID DEFICIENCY: Primary | ICD-10-CM

## 2025-02-21 DIAGNOSIS — N81.2 INCOMPLETE UTEROVAGINAL PROLAPSE: ICD-10-CM

## 2025-02-21 DIAGNOSIS — R35.0 URINARY FREQUENCY: ICD-10-CM

## 2025-02-21 DIAGNOSIS — N39.41 URGE INCONTINENCE OF URINE: Primary | ICD-10-CM

## 2025-02-21 DIAGNOSIS — R35.1 NOCTURIA: ICD-10-CM

## 2025-02-21 RX ORDER — VIBEGRON 75 MG/1
1 TABLET, FILM COATED ORAL DAILY
Qty: 90 TABLET | Refills: 3 | Status: SHIPPED | OUTPATIENT
Start: 2025-02-21 | End: 2026-02-21

## 2025-02-21 RX ORDER — FOLIC ACID 0.4 MG
400 TABLET ORAL DAILY
Qty: 90 TABLET | Refills: 3 | Status: SHIPPED | OUTPATIENT
Start: 2025-02-21 | End: 2026-02-21

## 2025-02-21 NOTE — PROGRESS NOTES
"  The patient location is: [place]  The chief complaint leading to consultation is: Medication follow up - Gemtesa    Visit type: audiovisual    Face to Face time with patient: 6  10 minutes of total time spent on the encounter, which includes face to face time and non-face to face time preparing to see the patient (eg, review of tests), Obtaining and/or reviewing separately obtained history, Documenting clinical information in the electronic or other health record, Independently interpreting results (not separately reported) and communicating results to the patient/family/caregiver, or Care coordination (not separately reported).         Each patient to whom he or she provides medical services by telemedicine is:  (1) informed of the relationship between the physician and patient and the respective role of any other health care provider with respect to management of the patient; and (2) notified that he or she may decline to receive medical services by telemedicine and may withdraw from such care at any time.    Notes:     Urge incontinence of urine  -     vibegron (GEMTESA) 75 mg Tab; Take 1 tablet (75 mg total) by mouth once daily.  Dispense: 90 tablet; Refill: 3    Urinary urgency  -     Percutaneous Nerve Stimulation (PTNS); Future  -     vibegron (GEMTESA) 75 mg Tab; Take 1 tablet (75 mg total) by mouth once daily.  Dispense: 90 tablet; Refill: 3    Urinary frequency  -     Percutaneous Nerve Stimulation (PTNS); Future  -     vibegron (GEMTESA) 75 mg Tab; Take 1 tablet (75 mg total) by mouth once daily.  Dispense: 90 tablet; Refill: 3    Nocturia    Incomplete uterovaginal prolapse        80 yo with stage 4 CKD, stage 2 incomplete uterovaginal prolapse and OAB/ urgency urinary incontinence presented today for a follow up visit. She was taking 1/2 dose of Gemtesa without any effect but now taking a full dose.     Patient states that "everything is good". She reports that she thinks the full dose of Gemtesa is " ""working" although not dramatic but does not feel that it is placebo. States she is waking one less time overnight, roughly now voiding two times. She states that she is voiding every 2 hours. She has more confidence as she has fewer episodes where she feels that the urgency is an emergency. Feels that symptoms are about 30% better since her last visit. Suggested she try the medication in the evening if she wants to see if it will improve the nighttime symptoms.     Patient does not desire surgery for her prolapse. We have discussed the option of a pessary. She was concerned about pain and we reviewed that if it is a good fit she should not be aware of the pessary although sometimes with removal of the pessary for cleanig/ exams it can be painful.     Inquired if she would want to pursue an advanced treatment option such as botox, percutaneous tibial nerve stimulation or sacral neuromodulation. Patient is interested in percutaneous tibial nerve stimulation and we will schedule her for treatment visits. She will return in 3 months for re-evaluation.    "

## 2025-02-23 VITALS
BODY MASS INDEX: 21.05 KG/M2 | OXYGEN SATURATION: 96 % | HEART RATE: 81 BPM | HEIGHT: 63 IN | DIASTOLIC BLOOD PRESSURE: 82 MMHG | SYSTOLIC BLOOD PRESSURE: 128 MMHG | WEIGHT: 118.81 LBS

## 2025-02-24 ENCOUNTER — TELEPHONE (OUTPATIENT)
Dept: UROGYNECOLOGY | Facility: CLINIC | Age: 80
End: 2025-02-24
Payer: MEDICARE

## 2025-02-24 NOTE — TELEPHONE ENCOUNTER
Returned call and assisted rescheduling.     ----- Message from Isabella sent at 2/24/2025 10:57 AM CST -----  Name of Who is Calling:DANYELL CALDERA [[id number]]  What is the request in detail: Pt calling because she would like to change the time for her procedure and asking for a call back.Please call back to further assist.   Can the clinic reply by [facility]: No  What Number to Call Back if not in [facility]: [phone]

## 2025-02-25 ENCOUNTER — LAB VISIT (OUTPATIENT)
Dept: LAB | Facility: OTHER | Age: 80
End: 2025-02-25
Attending: INTERNAL MEDICINE
Payer: MEDICARE

## 2025-02-25 DIAGNOSIS — E53.8 FOLATE DEFICIENCY: ICD-10-CM

## 2025-02-25 DIAGNOSIS — D64.9 ANEMIA, UNSPECIFIED: ICD-10-CM

## 2025-02-25 DIAGNOSIS — E53.8 B12 DEFICIENCY: ICD-10-CM

## 2025-02-25 DIAGNOSIS — M81.0 OSTEOPOROSIS WITHOUT CURRENT PATHOLOGICAL FRACTURE, UNSPECIFIED OSTEOPOROSIS TYPE: ICD-10-CM

## 2025-02-25 DIAGNOSIS — N18.4 CKD (CHRONIC KIDNEY DISEASE) STAGE 4, GFR 15-29 ML/MIN: ICD-10-CM

## 2025-02-25 DIAGNOSIS — D64.9 ANEMIA, UNSPECIFIED TYPE: ICD-10-CM

## 2025-02-25 LAB
CHOLEST SERPL-MCNC: 193 MG/DL (ref 120–199)
CHOLEST/HDLC SERPL: 2.7 {RATIO} (ref 2–5)
FERRITIN SERPL-MCNC: 342 NG/ML (ref 20–300)
FOLATE SERPL-MCNC: 33.6 NG/ML (ref 4–24)
HDLC SERPL-MCNC: 72 MG/DL (ref 40–75)
HDLC SERPL: 37.3 % (ref 20–50)
IRON SERPL-MCNC: 79 UG/DL (ref 30–160)
LDLC SERPL CALC-MCNC: 90.6 MG/DL (ref 63–159)
NONHDLC SERPL-MCNC: 121 MG/DL
SATURATED IRON: 24 % (ref 20–50)
TOTAL IRON BINDING CAPACITY: 330 UG/DL (ref 250–450)
TRANSFERRIN SERPL-MCNC: 223 MG/DL (ref 200–375)
TRIGL SERPL-MCNC: 152 MG/DL (ref 30–150)
TSH SERPL DL<=0.005 MIU/L-ACNC: 2.06 UIU/ML (ref 0.4–4)
VIT B12 SERPL-MCNC: 549 PG/ML (ref 210–950)

## 2025-02-25 PROCEDURE — 80061 LIPID PANEL: CPT | Performed by: INTERNAL MEDICINE

## 2025-02-25 PROCEDURE — 36415 COLL VENOUS BLD VENIPUNCTURE: CPT | Performed by: INTERNAL MEDICINE

## 2025-02-25 PROCEDURE — 82607 VITAMIN B-12: CPT | Performed by: INTERNAL MEDICINE

## 2025-02-25 PROCEDURE — 82728 ASSAY OF FERRITIN: CPT | Performed by: INTERNAL MEDICINE

## 2025-02-25 PROCEDURE — 82746 ASSAY OF FOLIC ACID SERUM: CPT | Performed by: INTERNAL MEDICINE

## 2025-02-25 PROCEDURE — 84443 ASSAY THYROID STIM HORMONE: CPT | Performed by: INTERNAL MEDICINE

## 2025-02-25 PROCEDURE — 83921 ORGANIC ACID SINGLE QUANT: CPT | Performed by: INTERNAL MEDICINE

## 2025-02-25 PROCEDURE — 83540 ASSAY OF IRON: CPT | Performed by: INTERNAL MEDICINE

## 2025-02-28 ENCOUNTER — RESULTS FOLLOW-UP (OUTPATIENT)
Dept: INTERNAL MEDICINE | Facility: CLINIC | Age: 80
End: 2025-02-28

## 2025-03-02 LAB — METHYLMALONATE SERPL-SCNC: 0.25 UMOL/L

## 2025-03-05 NOTE — PROGRESS NOTES
"[facility]   Physical Therapy Treatment Note     Name: Nelsy Mckenna  Clinic Number: [id number]    Therapy Diagnosis:   Encounter Diagnoses   Name Primary?    Acute right-sided low back pain with right-sided sciatica Yes    Muscle weakness of lower extremity     Postural imbalance        Physician: Harmeet Reyes MD    Visit Date: 12/28/2022  Physician Orders: PT Eval and Treat   Medical Diagnosis: M41.50 (ICD-10-CM) - Degenerative scoliosis  Evaluation Date: 12/7/2022  Authorization Period Expiration: 1?4/2023  Plan of Care Certification Period: 2/1/2023  Visit # / Visits authorized: 5 (4/11)  FOTO: 0/ 5  (12/28/2022)  PTA Visit #: 2/5     Time In: 8:10 am  Time Out: 8:58 am  Total Billable Time: 48 minutes    Precautions: Standard    Subjective   Pt reports: Haven't had any back pain the past several days    She was compliant with home exercise program.  Response to previous treatment: "It was fine"  Functional change: no change reported today    Pain: 2/10  Location: R low back    OBJECTIVE     Objective Measures updated at progress report unless specified.     Treatment     Nelsy received therapeutic exercises to develop strength, ROM, flexibility, posture, and core stabilization for 48 minutes including:  LTR x 3'  TA bracing 20 x 5"  Supine TA marches 2 x 10  PPT 20 x 5"  Bridging 2 x 10 reps  Hook lying 3 way clams with GTB x 20 reps each  Piriformis stretches 3 x 30" (IR and ER techniques)  Side lying hip abd 2 x 10 reps  Hip hinges with dowel x 20 reps  Dead lifts with 4# from 6" step 2 x 10 reps  Lateral walking 2 x 40 ft with pink band  Hesitation marches 2 x 40 ft  +Standing hip ext 2 x 10    Nelsy participated in neuromuscular re-education activities to improve: Balance, Proprioception, and Posture for 00 minutes. The following activities were included:    Nelsy received hot pack for 00 minutes to low back.    Nelsy received cold pack for 00 minutes to low back.      Home Exercises " Provided and Patient Education Provided     Education provided:   - Exercise form and rationale  - Bridges  - Hip abd    Written Home Exercises Provided: yes.  Exercises were reviewed and Nelsy was able to demonstrate them prior to the end of the session.  Nelsy demonstrated good  understanding of the education provided.     See EMR under Patient Instructions for exercises provided prior visit.    Assessment   Pt tolerated treatment well with some low back pain when performing hip hinges, but ceased when returning to standing. Added 4 inch step with dead lifts as pt was unable to descend low enough to reach step with weight due to quad weakness. Pt noted good muscular fatigue in hips and quads following session.    Nelsy Is progressing well towards her goals.   Pt prognosis is Good.     Pt will continue to benefit from skilled outpatient physical therapy to address the deficits listed in the problem list box on initial evaluation, provide pt/family education and to maximize pt's level of independence in the home and community environment.     Pt's spiritual, cultural and educational needs considered and pt agreeable to plan of care and goals.    Anticipated barriers to physical therapy: none    Goals:   Short Term Goals: 4 weeks   Independent with HEP. (In progress)  Report decreased lumbar and R LE pain < or =  6/10 with adls such as turning in bed, getting out of a chair, leaning over, and loading the . (In progress)  Increased MMT for B LE by 1/2 muscle grade to promote proper pelvic stability to decrease  lumbar and R LE pain < or =  6/10 with adls such as turning in bed, getting out of a chair, leaning over, and loading the .  (In progress)     Long Term Goals: 8 weeks   Report decreased lumbar and R LE pain < or =  3/10 with adls such as turning in bed, getting out of a chair, leaning over, and loading the . (In progress)  Increased MMT for B LE by 1 muscle grade to promote proper  pelvic stability to decrease lumbar and R LE pain < or =  3/10 with adls such as turning in bed, getting out of a chair, leaning over, and loading the .  (In progress)  Increased flexibility in R hamstrings to -20 deg with 90/90 test to promote proper pelvic stability to decrease lumbar and R LE pain < or =  3/10 with adls such as turning in bed, getting out of a chair, leaning over, and loading the . (In progress)  Increased flexibility in B piriformis and R IT bands to promote proper pelvic stability to decrease lumbar and R LE pain < or =  3/10 with adls such as turning in bed, getting out of a chair, leaning over, and loading the . (In progress)  Patient to achieve CJ (at least 20% < 40% impaired, limited or restricted) level on the FOTO Outcomes Measurement System. (In progress)    Plan     Continue with core strengthening, LE stretching and strengthening. Will add weight for dead lifts next visit.    Kayleen Don III, PTA    500

## 2025-03-17 ENCOUNTER — CLINICAL SUPPORT (OUTPATIENT)
Dept: REHABILITATION | Facility: OTHER | Age: 80
End: 2025-03-17
Attending: ORTHOPAEDIC SURGERY
Payer: MEDICARE

## 2025-03-17 DIAGNOSIS — M54.50 CHRONIC RIGHT-SIDED LOW BACK PAIN WITHOUT SCIATICA: Primary | ICD-10-CM

## 2025-03-17 DIAGNOSIS — G89.29 CHRONIC RIGHT-SIDED LOW BACK PAIN WITHOUT SCIATICA: Primary | ICD-10-CM

## 2025-03-17 DIAGNOSIS — M62.81 MUSCLE WEAKNESS: ICD-10-CM

## 2025-03-17 DIAGNOSIS — R29.3 ABNORMAL POSTURE: ICD-10-CM

## 2025-03-17 PROCEDURE — 97140 MANUAL THERAPY 1/> REGIONS: CPT | Mod: PN | Performed by: PHYSICAL THERAPIST

## 2025-03-17 PROCEDURE — 97161 PT EVAL LOW COMPLEX 20 MIN: CPT | Mod: PN | Performed by: PHYSICAL THERAPIST

## 2025-03-17 NOTE — LETTER
March 18, 2025  Moo Hernandez MD  [address] [place] [id number]    To whom it may concern,     The attached plan of care is being sent to you for review and reference.    You may indicate your approval by signing the document electronically, or by faxing/mailing a signed copy of the final page of this document back to the attention of Zoraida Crowder PT, DPT:         Plan of Care 3/18/25   Effective from: 3/18/2025  Effective to: 5/13/2025    Plan ID: [id number]            Participants as of Finalize on 3/18/2025    Name Type Comments Contact Info    Moo Hernandez MD Referring Provider  [phone]    Zoraida Crowder PT, DPT Physical Therapist         Last Plan Note     Author: Zoraida Crowder PT, DPT Status: Signed Last edited: 3/17/2025  2:45 PM         Outpatient Rehab    Physical Therapy Evaluation    Patient Name: Nelsy Mckenna  MRN: [id number]  YOB: 1945  Encounter Date: 3/17/2025    Therapy Diagnosis:   Encounter Diagnoses   Name Primary?   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   FOTO report - see Media section or FOTO account episode details.     Intake Score: 56%  Predicted Score: 71%       Subjective   History of Present Illness  Nelsy is a 79 y.o. female who reports to physical therapy with a chief concern of back pain R sided.     The patient reports a medical diagnosis of Other  intervertebral disc degeneration, lumbar region with discogenic back pain only.    Diagnostic tests related to this condition: MRI studies.   MRI Studies Details: Impression:     Lumbar spondylosis, most prominent at L4-5 contributing to moderate spinal canal and neural foraminal stenosis, as above.     Advanced right-sided degenerative disc disease at L4-5, as above.        Electronically signed by:David Ramirez MD    History of Present Condition/Illness: She reports some back pain. Its on the Right side only. Thinks its a pinched nerve. Only hurts when she goes to move. Bending over, sit to stand, lifting anything increases the discomfort. Shooting pain is mostly in the butt but will radiate down the front of the Right leg. Never goes below her knee. She had PT for this in the past. One week she will be fine and the next she will be crying. The most recent bout started about 3 weeks ago. That week was really bad and it has gotten better. She does yoga 2x/week and has a  1x/week. Denies numbness and tingling. Unable to roll over in bed the first week, but the last week or so it has been fine and no issue at night. Doesn't think PT helped all that much in the past.     Pain     Patient reports a current pain level of 4/10. Pain at best is reported as 0/10. Pain at worst is reported as 9/10.   Location: R sided low back pain  Clinical Progression (since onset): Stable  Pain Qualities: Sharp, Other (Comment)  Other Pain Qualities: shooting         Review of Systems  Patient reports: Kidney History  Patient denies: Bladder Incontinence, Bowel Incontinence, Sleep Disturbance, Weight Gain, and Weight Loss        Treatment History  Treatments  Previously Received Treatments: Yes  Previous Treatments: Medications - over-the-counter, Medications - prescription, Physical therapy  Currently Receiving Treatments: No    Living Arrangements  Living Situation  Housing: Home independently  Living Arrangements: Family  members        Employment  Employment Status: Retired          Past Medical History/Physical Systems Review:   Nelsy Mckenna  has a past medical history of Cataract, Diverticulosis, Nephrolithiasis, Osteopenia, PCK (polycystic kidney disease), and Vitamin D deficiency.    Nelsy Mckenna  has a past surgical history that includes  section (); Nasal septum surgery; Facial cosmetic surgery; Tubal ligation (); Refractive surgery (Bilateral, ); Colonoscopy (N/A, 2019); Eye surgery; and Cosmetic surgery.    Nelsy has a current medication list which includes the following prescription(s): alprazolam, cholecalciferol (vitamin d3), estradiol, folic acid, jynarque, losartan, prolia, and gemtesa.    Review of patient's allergies indicates:  No Known Allergies     Objective   Posture                 Obvious scoliosis.     Right Dermatomes  Right Lumbar Dermatome Light Touch  Intact: L1, L2, L3, L4, and L5  Right Sacral Dermatome Light Touch  Intact: S1-2       Left Dermatomes  Left Lumbar Dermatome Light Touch  Intact: L1, L2, L3, L4, and L5  Left Sacral Dermatome Light Touch  Intact: S1-2           Spinal Mobility  Thoracic Mobility Details: Hypomobility noted with scoliotic curve  Lumbosacral Mobility Details: Hypomobility at sacrum, hypomobility at Left 4/5          Hip Range of Motion   Right Hip   Active (deg) Passive (deg) Pain   Flexion   125     Extension   5     ABduction   45     ADduction         External Rotation 90/90   45     External Rotation Prone         Internal Rotation 90/90   20     Internal Rotation Prone             Left Hip   Active (deg) Passive (deg) Pain   Flexion   125     Extension   5     ABduction   45     ADduction         External Rotation 90/90   40     External Rotation Prone         Internal Rotation 90/90   30     Internal Rotation Prone                            Hip Strength - Planes of Motion   Right Strength Right Pain Left Strength Left  Pain   Flexion (L2) 4-   4-      Extension 4   4     ABduction 3   3     ADduction           Internal Rotation 4   4     External Rotation 3+   3+             Lumbar/Pelvic Girdle Special Tests       Lumbar Tests - SLR and Tension  Positive: Right Passive Straight Leg Raise and Right Femoral Nerve Tension  Negative: Left Passive Straight Leg Raise and Left Femoral Nerve Tension                 Pelvic Girdle / Sacrum Tests  Positive: Right AYANA  Negative: Left AYANA, Right FADIR, Left FADIR, Right Sacral Spring, and Left Sacral Spring  Negative: Right Thigh Thrust/Posterior Shear and Left Thigh Thrust/Posterior Shear         Hip Special Tests  Intra-Articular/Impingement Tests  Positive: Right AYANA  Negative: Left AYANA, Right FADIR, and Left FADIR  Sacroiliac Joint Tests  Negative: Right Thigh Thrust/Posterior Shear and Left Thigh Thrust/Posterior Shear  Other Hip Special Tests  Positive: Right Femoral Nerve Tension  Negative: Left Femoral Nerve Tension                Squat Testing        Knee collapse medially    Single Leg Squat Testing - Right Leg              Femoral add/IR    Single Leg Squat Testing - Left Leg              Femoral add/IR       Gait Analysis  Gait Analysis Details  Left lumbar rotation extension ambulation pattern with B hip ER.        Lumbar AROM Pain/Dysfunction with Movement   Flexion 25% limited Clear rib hump due to scoliosis, flat lumbar region   Extension 50% limited Pain on Right    Right side bending 25% limited Pain on R   Left side bending 25% limited    Right rotation 50% limited    Left rotation 25% limited Pain on Right        Treatment:  Manual Therapy  MT 1: MET for rotated innominate  MT 2: pt education  Balance/Neuromuscular Re-Education  NMR 1: Femoral nerve glide x5  NMR 2: Pretzel x5  NMR 3: bridge with lat press x5    Time Entry(in minutes):  PT Evaluation (Moderate) Time Entry: 30  Manual Therapy Time Entry: 10  Neuromuscular Re-Education Time Entry: 5    Assessment & Plan   Assessment  Nelsy ramos  with a condition of Low complexity.   Presentation of Symptoms: Stable  Will Comorbidities Impact Care: Yes  Scoliosis     Functional Limitations: Activity tolerance, Decreased ambulation distance/endurance, Gait limitations, Pain with ADLs/IADLs, Range of motion, Standing tolerance  Impairments: Abnormal gait, Abnormal or restricted range of motion, Impaired physical strength    Patient Goal for Therapy (PT): Would like to not have to have an injection  Prognosis: Fair  Prognosis Details: - Barriers to prognosis:  scoliosis   Assessment Details: Nelsy presents to PT today with complaints of low back pain with radiating pain. She demonstrates obvious scoliosis which may impact progression. She further demonstrate innominate rotation which was addressed with manual. Femoral nerve irritation seems to be present as well. She currently has trouble with functional mobility 2/2 pain. She would benefit from continued skilled PT to address dysfunction listed above in order to improve mobility, reduce pain, and improve quality of life.       Patient's spiritual, cultural, and educational needs considered and patient agreeable to plan of care and goals.     Education  Education was done with Patient.           - pt education on HEP, PLAN OF CARE, and signs and symptoms as they relate to current dysfunction       Goals:   Active       LTG       Pt will be independent with final HEP in order to DC to home program       Start:  03/18/25    Expected End:  05/13/25            Pt will reduce worst pain to 3/10 in order to ambulate properly       Start:  03/18/25    Expected End:  05/13/25            Pt will improve B hip abd strength to 4-/5 in order to improve functional mobility        Start:  03/18/25    Expected End:  05/13/25            Pt will improve hip ER strength to 4/5 in order to improve functional mobility        Start:  03/18/25    Expected End:  05/13/25                Zoraida Crowder, PT, DPT         Current  Participants as of 3/18/2025    Name Type Comments Contact Info    Moo Hernandez MD Referring Provider  [phone]    Signature pending    Zoraida Crowder, PT, DPT Physical Therapist                  Sincerely,      Zoraida Crowder, PT, DPT  [facility]                                                            Dear Zoraida Crowder, PT, DPT,    RE: Ms. Nelsy Mckenna, MRN: [id number]    I certify that I have reviewed the attached plan of care and agree to the details within.        ___________________________  ___________________________  Provider Printed Name   Provider Signed Name      ___________________________  Date and Time

## 2025-03-17 NOTE — PROGRESS NOTES
Outpatient Rehab    Physical Therapy Evaluation    Patient Name: Nelsy Mckenna  MRN: [id number]  YOB: 1945  Encounter Date: 3/17/2025    Therapy Diagnosis:   Encounter Diagnoses   Name Primary?    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   FOTO report - see Media section or FOTO account episode details.     Intake Score: 56%  Predicted Score: 71%       Subjective   History of Present Illness  Nelsy is a 79 y.o. female who reports to physical therapy with a chief concern of back pain R sided.     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    Diagnostic tests related to this condition: MRI studies.   MRI Studies Details: Impression:     Lumbar spondylosis, most prominent at L4-5 contributing to moderate spinal canal and neural foraminal stenosis, as above.     Advanced right-sided degenerative disc disease at L4-5, as above.        Electronically signed by:David Ramirez MD    History of Present Condition/Illness: She reports some back pain. Its on the Right side only. Thinks its a pinched nerve. Only hurts when she goes to move. Bending over, sit to stand, lifting anything increases the discomfort. Shooting pain is mostly in the butt but will radiate down the front of the Right leg. Never goes below her knee. She had PT for this in the past. One  week she will be fine and the next she will be crying. The most recent bout started about 3 weeks ago. That week was really bad and it has gotten better. She does yoga 2x/week and has a  1x/week. Denies numbness and tingling. Unable to roll over in bed the first week, but the last week or so it has been fine and no issue at night. Doesn't think PT helped all that much in the past.     Pain     Patient reports a current pain level of 4/10. Pain at best is reported as 0/10. Pain at worst is reported as 9/10.   Location: R sided low back pain  Clinical Progression (since onset): Stable  Pain Qualities: Sharp, Other (Comment)  Other Pain Qualities: shooting         Review of Systems  Patient reports: Kidney History  Patient denies: Bladder Incontinence, Bowel Incontinence, Sleep Disturbance, Weight Gain, and Weight Loss        Treatment History  Treatments  Previously Received Treatments: Yes  Previous Treatments: Medications - over-the-counter, Medications - prescription, Physical therapy  Currently Receiving Treatments: No    Living Arrangements  Living Situation  Housing: Home independently  Living Arrangements: Family members        Employment  Employment Status: Retired          Past Medical History/Physical Systems Review:   Nelsy Mckenna  has a past medical history of Cataract, Diverticulosis, Nephrolithiasis, Osteopenia, PCK (polycystic kidney disease), and Vitamin D deficiency.    Nelsy Mckenna  has a past surgical history that includes  section (); Nasal septum surgery; Facial cosmetic surgery; Tubal ligation (); Refractive surgery (Bilateral, ); Colonoscopy (N/A, 2019); Eye surgery; and Cosmetic surgery.    Nelsy has a current medication list which includes the following prescription(s): alprazolam, cholecalciferol (vitamin d3), estradiol, folic acid, jynarque, losartan, prolia, and gemtesa.    Review of patient's allergies indicates:  No Known Allergies     Objective   Posture                  Obvious scoliosis.     Right Dermatomes  Right Lumbar Dermatome Light Touch  Intact: L1, L2, L3, L4, and L5  Right Sacral Dermatome Light Touch  Intact: S1-2       Left Dermatomes  Left Lumbar Dermatome Light Touch  Intact: L1, L2, L3, L4, and L5  Left Sacral Dermatome Light Touch  Intact: S1-2           Spinal Mobility  Thoracic Mobility Details: Hypomobility noted with scoliotic curve  Lumbosacral Mobility Details: Hypomobility at sacrum, hypomobility at Left 4/5          Hip Range of Motion   Right Hip   Active (deg) Passive (deg) Pain   Flexion   125     Extension   5     ABduction   45     ADduction         External Rotation 90/90   45     External Rotation Prone         Internal Rotation 90/90   20     Internal Rotation Prone             Left Hip   Active (deg) Passive (deg) Pain   Flexion   125     Extension   5     ABduction   45     ADduction         External Rotation 90/90   40     External Rotation Prone         Internal Rotation 90/90   30     Internal Rotation Prone                            Hip Strength - Planes of Motion   Right Strength Right Pain Left Strength Left  Pain   Flexion (L2) 4-   4-     Extension 4   4     ABduction 3   3     ADduction           Internal Rotation 4   4     External Rotation 3+   3+             Lumbar/Pelvic Girdle Special Tests       Lumbar Tests - SLR and Tension  Positive: Right Passive Straight Leg Raise and Right Femoral Nerve Tension  Negative: Left Passive Straight Leg Raise and Left Femoral Nerve Tension                 Pelvic Girdle / Sacrum Tests  Positive: Right AYANA  Negative: Left AYANA, Right FADIR, Left FADIR, Right Sacral Spring, and Left Sacral Spring  Negative: Right Thigh Thrust/Posterior Shear and Left Thigh Thrust/Posterior Shear         Hip Special Tests  Intra-Articular/Impingement Tests  Positive: Right AYANA  Negative: Left AYANA, Right FADIR, and Left FADIR  Sacroiliac Joint Tests  Negative: Right Thigh Thrust/Posterior Shear and Left  Thigh Thrust/Posterior Shear  Other Hip Special Tests  Positive: Right Femoral Nerve Tension  Negative: Left Femoral Nerve Tension                Squat Testing        Knee collapse medially    Single Leg Squat Testing - Right Leg              Femoral add/IR    Single Leg Squat Testing - Left Leg              Femoral add/IR       Gait Analysis  Gait Analysis Details  Left lumbar rotation extension ambulation pattern with B hip ER.        Lumbar AROM Pain/Dysfunction with Movement   Flexion 25% limited Clear rib hump due to scoliosis, flat lumbar region   Extension 50% limited Pain on Right    Right side bending 25% limited Pain on R   Left side bending 25% limited    Right rotation 50% limited    Left rotation 25% limited Pain on Right        Treatment:  Manual Therapy  MT 1: MET for rotated innominate  MT 2: pt education  Balance/Neuromuscular Re-Education  NMR 1: Femoral nerve glide x5  NMR 2: Pretzel x5  NMR 3: bridge with lat press x5    Time Entry(in minutes):  PT Evaluation (Moderate) Time Entry: 30  Manual Therapy Time Entry: 10  Neuromuscular Re-Education Time Entry: 5    Assessment & Plan   Assessment  Nelsy presents with a condition of Low complexity.   Presentation of Symptoms: Stable  Will Comorbidities Impact Care: Yes  Scoliosis     Functional Limitations: Activity tolerance, Decreased ambulation distance/endurance, Gait limitations, Pain with ADLs/IADLs, Range of motion, Standing tolerance  Impairments: Abnormal gait, Abnormal or restricted range of motion, Impaired physical strength    Patient Goal for Therapy (PT): Would like to not have to have an injection  Prognosis: Fair  Prognosis Details: - Barriers to prognosis:  scoliosis   Assessment Details: Nelsy presents to PT today with complaints of low back pain with radiating pain. She demonstrates obvious scoliosis which may impact progression. She further demonstrate innominate rotation which was addressed with manual. Femoral nerve irritation seems to  be present as well. She currently has trouble with functional mobility 2/2 pain. She would benefit from continued skilled PT to address dysfunction listed above in order to improve mobility, reduce pain, and improve quality of life.       Patient's spiritual, cultural, and educational needs considered and patient agreeable to plan of care and goals.     Education  Education was done with Patient.           - pt education on HEP, PLAN OF CARE, and signs and symptoms as they relate to current dysfunction       Goals:   Active       LTG       Pt will be independent with final HEP in order to DC to home program       Start:  03/18/25    Expected End:  05/13/25            Pt will reduce worst pain to 3/10 in order to ambulate properly       Start:  03/18/25    Expected End:  05/13/25            Pt will improve B hip abd strength to 4-/5 in order to improve functional mobility        Start:  03/18/25    Expected End:  05/13/25            Pt will improve hip ER strength to 4/5 in order to improve functional mobility        Start:  03/18/25    Expected End:  05/13/25                Zoraida Crowder, PT, DPT

## 2025-03-18 ENCOUNTER — PATIENT MESSAGE (OUTPATIENT)
Dept: UROGYNECOLOGY | Facility: CLINIC | Age: 80
End: 2025-03-18
Payer: MEDICARE

## 2025-03-18 PROBLEM — G89.29 CHRONIC RIGHT-SIDED LOW BACK PAIN WITHOUT SCIATICA: Status: ACTIVE | Noted: 2025-03-18

## 2025-03-18 PROBLEM — M54.50 CHRONIC RIGHT-SIDED LOW BACK PAIN WITHOUT SCIATICA: Status: ACTIVE | Noted: 2025-03-18

## 2025-03-18 PROBLEM — M62.81 MUSCLE WEAKNESS: Status: ACTIVE | Noted: 2025-03-18

## 2025-03-18 PROBLEM — R29.3 ABNORMAL POSTURE: Status: ACTIVE | Noted: 2025-03-18

## 2025-03-21 ENCOUNTER — CLINICAL SUPPORT (OUTPATIENT)
Dept: REHABILITATION | Facility: OTHER | Age: 80
End: 2025-03-21
Payer: MEDICARE

## 2025-03-21 DIAGNOSIS — M62.81 MUSCLE WEAKNESS: ICD-10-CM

## 2025-03-21 DIAGNOSIS — R29.3 ABNORMAL POSTURE: ICD-10-CM

## 2025-03-21 DIAGNOSIS — M54.50 CHRONIC RIGHT-SIDED LOW BACK PAIN WITHOUT SCIATICA: Primary | ICD-10-CM

## 2025-03-21 DIAGNOSIS — G89.29 CHRONIC RIGHT-SIDED LOW BACK PAIN WITHOUT SCIATICA: Primary | ICD-10-CM

## 2025-03-21 PROCEDURE — 97112 NEUROMUSCULAR REEDUCATION: CPT | Mod: PN | Performed by: PHYSICAL THERAPIST

## 2025-03-21 PROCEDURE — 97140 MANUAL THERAPY 1/> REGIONS: CPT | Mod: PN | Performed by: PHYSICAL THERAPIST

## 2025-03-21 NOTE — PROGRESS NOTES
Outpatient Rehab    Physical Therapy Visit    Patient Name: Nelsy Mckenna  MRN: [id number]  YOB: 1945  Encounter Date: 3/21/2025    Therapy Diagnosis:   Encounter Diagnoses   Name Primary?    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 She has some questions about the exercises though..  Pain reported as 1/10. back and down the R leg    Objective            Treatment:  Manual Therapy  MT 1: MET for rotated innominate  MT 2: pt education  Balance/Neuromuscular Re-Education  NMR 1: Femoral nerve glide x20  NMR 2: Pretzel 2x10 B  NMR 3: bridge with lat press 2x10 B  NMR 4: Clamshell RTB 2x10 B  NMR 5: lumbar lock hip ext 3x10 B  NMR 6: Lat pull down c core contraction orange sports cord 4x5    Time Entry(in minutes):  Manual Therapy Time Entry: 10  Neuromuscular Re-Education Time Entry: 48    Assessment & Plan   Assessment: Nlesy presents to PT today with improvement in pain. She continues to show some innominate rotation which was addressed with manual. Focus was on motor control of lumbosacral stabilizers. PT to continue to monitor and progress PRN.       Patient will continue to benefit from skilled outpatient physical therapy to address the deficits listed in the problem list box on initial evaluation, provide pt/family education and to maximize pt's level of independence in the home and community environment.     Patient's spiritual, cultural, and educational needs considered and patient  agreeable to plan of care and goals.     Education  Education was done with Patient.           - pt education on HEP, PLAN OF CARE, and signs and symptoms as they relate to current dysfunction       Plan: Monitor response to tx. motor control training.    Goals:   Active       LTG       Pt will be independent with final HEP in order to DC to home program       Start:  03/18/25    Expected End:  05/13/25            Pt will reduce worst pain to 3/10 in order to ambulate properly       Start:  03/18/25    Expected End:  05/13/25            Pt will improve B hip abd strength to 4-/5 in order to improve functional mobility        Start:  03/18/25    Expected End:  05/13/25            Pt will improve hip ER strength to 4/5 in order to improve functional mobility        Start:  03/18/25    Expected End:  05/13/25                Zoraida Crowder, PT, DPT

## 2025-03-24 DIAGNOSIS — Z00.00 ENCOUNTER FOR MEDICARE ANNUAL WELLNESS EXAM: ICD-10-CM

## 2025-03-25 ENCOUNTER — CLINICAL SUPPORT (OUTPATIENT)
Dept: REHABILITATION | Facility: OTHER | Age: 80
End: 2025-03-25
Payer: MEDICARE

## 2025-03-25 DIAGNOSIS — G89.29 CHRONIC RIGHT-SIDED LOW BACK PAIN WITHOUT SCIATICA: Primary | ICD-10-CM

## 2025-03-25 DIAGNOSIS — R29.3 ABNORMAL POSTURE: ICD-10-CM

## 2025-03-25 DIAGNOSIS — M62.81 MUSCLE WEAKNESS: ICD-10-CM

## 2025-03-25 DIAGNOSIS — M54.50 CHRONIC RIGHT-SIDED LOW BACK PAIN WITHOUT SCIATICA: Primary | ICD-10-CM

## 2025-03-25 PROCEDURE — 97112 NEUROMUSCULAR REEDUCATION: CPT | Mod: PN | Performed by: PHYSICAL THERAPIST

## 2025-03-25 NOTE — PROGRESS NOTES
"  Outpatient Rehab    Physical Therapy Visit    Patient Name: Nelsy Mckenna  MRN: [id number]  YOB: 1945  Encounter Date: 3/25/2025    Therapy Diagnosis:   Encounter Diagnoses   Name Primary?    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 But does have a bit more pain in the R low back..  Pain reported as 3/10. R low back    Objective            Treatment:  Manual Therapy  MT 1: MET for rotated innominate  MT 2: pt education  Balance/Neuromuscular Re-Education  NMR 1: Femoral nerve glide x20  NMR 2: Pretzel 2x10 B  NMR 3: bridge with lat press 2x10 B  NMR 4: Clamshell RTB 2x10 B  NMR 5: lumbar lock hip ext 3x10 B  NMR 6: Lat pull down c core contraction orange sports cord 4x5  NMR 7: Sit to stand 18" box 3x10    Time Entry(in minutes):  Manual Therapy Time Entry: 10  Neuromuscular Re-Education Time Entry: 49    Assessment & Plan   Assessment: Nelsy presents to PT today with continued improvement. R sided low back pain could continue to be due to scoliosis or innominate rotation. Innominates were adjusted today with improvement in pain. She continues to be strengthening in order to reduce return of abnomral posture and biomechanics.       Patient will continue to benefit from skilled outpatient physical therapy to address the deficits listed in the problem list box on initial evaluation, provide pt/family education and to maximize pt's level of independence in " the home and community environment.     Patient's spiritual, cultural, and educational needs considered and patient agreeable to plan of care and goals.     Education  Education was done with Patient.           - pt education on HEP, PLAN OF CARE, and signs and symptoms as they relate to current dysfunction         Plan: Monitor response to tx. motor control training.    Goals:   Active       LTG       Pt will be independent with final HEP in order to DC to home program       Start:  03/18/25    Expected End:  05/13/25            Pt will reduce worst pain to 3/10 in order to ambulate properly       Start:  03/18/25    Expected End:  05/13/25            Pt will improve B hip abd strength to 4-/5 in order to improve functional mobility        Start:  03/18/25    Expected End:  05/13/25            Pt will improve hip ER strength to 4/5 in order to improve functional mobility        Start:  03/18/25    Expected End:  05/13/25                Zoraida Crowder, PT, DPT

## 2025-03-28 ENCOUNTER — CLINICAL SUPPORT (OUTPATIENT)
Dept: REHABILITATION | Facility: OTHER | Age: 80
End: 2025-03-28
Attending: PHYSICAL THERAPIST
Payer: MEDICARE

## 2025-03-28 DIAGNOSIS — M62.81 MUSCLE WEAKNESS: ICD-10-CM

## 2025-03-28 DIAGNOSIS — R29.3 ABNORMAL POSTURE: ICD-10-CM

## 2025-03-28 DIAGNOSIS — M54.50 CHRONIC RIGHT-SIDED LOW BACK PAIN WITHOUT SCIATICA: Primary | ICD-10-CM

## 2025-03-28 DIAGNOSIS — G89.29 CHRONIC RIGHT-SIDED LOW BACK PAIN WITHOUT SCIATICA: Primary | ICD-10-CM

## 2025-03-28 PROCEDURE — 97140 MANUAL THERAPY 1/> REGIONS: CPT | Mod: PN | Performed by: PHYSICAL THERAPIST

## 2025-03-28 PROCEDURE — 97530 THERAPEUTIC ACTIVITIES: CPT | Mod: PN | Performed by: PHYSICAL THERAPIST

## 2025-03-28 PROCEDURE — 97112 NEUROMUSCULAR REEDUCATION: CPT | Mod: PN | Performed by: PHYSICAL THERAPIST

## 2025-03-28 NOTE — PROGRESS NOTES
"  Outpatient Rehab    Physical Therapy Visit    Patient Name: Nelsy Mckenna  MRN: [id number]  YOB: 1945  Encounter Date: 3/28/2025    Therapy Diagnosis:   Encounter Diagnoses   Name Primary?    Chronic right-sided low back pain without sciatica Yes    Abnormal posture     Muscle weakness          Physician: Moo Hernandez MD    Physician Orders: Eval and Treat  Medical Diagnosis: Other intervertebral disc degeneration, lumbar region with discogenic back pain only    Visit # / Visits Authorized:  3 / 10  Date of Evaluation: 3/17/2025   Insurance Authorization Period: 3/20/2025 to 12/31/2025  Plan of Care Certification:  3/17/2025 to 5/13/2025     Time In: 1201   Time Out: 1300  Total Time: 59   Total Billable Time: 59           Subjective   No pain down the front of the leg. Moved a bunch of furniture yesterday so she is feeling it a little today. On the R side..  Pain reported as 2/10. R low back    Objective            Treatment:  Manual Therapy  MT 1: Sidelying SIJ grade 3-4 mobilization  MT 2: Prone sacral flexion mobilization  MT 3: pt education  Balance/Neuromuscular Re-Education  NMR 1: Femoral nerve glide x20  NMR 3: bridge with lat press 2x10 B GTB thighs and arms  NMR 4: Clamshell GTB 2x10 B  NMR 5: lumbar lock hip ext 3x10 B  NMR 6: Lat pull down c core contraction orange sports cord 3x10  NMR 8: SLR pilates ring TrA 3x10 B  Therapeutic Activity  TA 1: Sit to stand 18" box 2x8 10#  TA 2: Pallof Press RTB 2x10 B    Time Entry(in minutes):  Manual Therapy Time Entry: 10  Neuromuscular Re-Education Time Entry: 38  Therapeutic Activity Time Entry: 10    Assessment & Plan   Assessment: Nelsy presents with no reports of Femoral nerve issues. Continued SIJ discomfort. L side hypomobility noted and manual was performed to address this. She was able to be progressed in several activities with focus on core control in combination with hip strength. PT to monitor response to tx.       Patient will continue to " benefit from skilled outpatient physical therapy to address the deficits listed in the problem list box on initial evaluation, provide pt/family education and to maximize pt's level of independence in the home and community environment.     Patient's spiritual, cultural, and educational needs considered and patient agreeable to plan of care and goals.     Education  Education was done with Patient.           - pt education on HEP, PLAN OF CARE, and signs and symptoms as they relate to current dysfunction           Plan: Monitor response to tx. motor control training.    Goals:   Active       LTG       Pt will be independent with final HEP in order to DC to home program       Start:  03/18/25    Expected End:  05/13/25            Pt will reduce worst pain to 3/10 in order to ambulate properly       Start:  03/18/25    Expected End:  05/13/25            Pt will improve B hip abd strength to 4-/5 in order to improve functional mobility        Start:  03/18/25    Expected End:  05/13/25            Pt will improve hip ER strength to 4/5 in order to improve functional mobility        Start:  03/18/25    Expected End:  05/13/25                Zoraida Crowder, PT, DPT

## 2025-04-01 ENCOUNTER — CLINICAL SUPPORT (OUTPATIENT)
Dept: REHABILITATION | Facility: OTHER | Age: 80
End: 2025-04-01
Payer: MEDICARE

## 2025-04-01 DIAGNOSIS — M62.81 MUSCLE WEAKNESS: ICD-10-CM

## 2025-04-01 DIAGNOSIS — G89.29 CHRONIC RIGHT-SIDED LOW BACK PAIN WITHOUT SCIATICA: Primary | ICD-10-CM

## 2025-04-01 DIAGNOSIS — M54.50 CHRONIC RIGHT-SIDED LOW BACK PAIN WITHOUT SCIATICA: Primary | ICD-10-CM

## 2025-04-01 DIAGNOSIS — R29.3 ABNORMAL POSTURE: ICD-10-CM

## 2025-04-01 PROCEDURE — 97530 THERAPEUTIC ACTIVITIES: CPT | Mod: PN | Performed by: PHYSICAL THERAPIST

## 2025-04-01 PROCEDURE — 97112 NEUROMUSCULAR REEDUCATION: CPT | Mod: PN | Performed by: PHYSICAL THERAPIST

## 2025-04-01 NOTE — PROGRESS NOTES
"  Outpatient Rehab    Physical Therapy Visit    Patient Name: Nelsy Mckenna  MRN: [id number]  YOB: 1945  Encounter Date: 4/1/2025    Therapy Diagnosis:   Encounter Diagnoses   Name Primary?    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    Visit # / Visits Authorized:  4 / 10  Date of Evaluation: 3/17/2025   Insurance Authorization Period: 3/20/2025 to 12/31/2025  Plan of Care Certification:  3/17/2025 to 5/13/2025     Time In: 1301   Time Out: 1400  Total Time: 59   Total Billable Time: 59           Subjective             Objective            Treatment:  Balance/Neuromuscular Re-Education  NMR 1: Femoral nerve glide x20  NMR 2: Pretzel 2x10 B 1#  NMR 3: bridge with lat press 2x10 B GTB thighs and arms  NMR 5: lumbar lock hip ext 2x15 B RTB  NMR 8: SLR pilates ring TrA 3x10 B  NMR 9: Prone hip ext c multidi actvation 3x10 B  Therapeutic Activity  TA 1: Sit to stand 18" box 2x8 10#  TA 2: Pallof Press RTB 2x10 B    Time Entry(in minutes):       Assessment & Plan   Assessment: Nelsy presents to PT today with no pain down the leg. She has minimal back pain but it has not been changed. She continues to show mobility deficits that are attempted to be adjusted with  manual. Scoliosis is probably a contributing factor. PT to continue to attempt to work on back pain.       Patient will continue to benefit from skilled outpatient physical therapy to address the deficits listed in the problem list box on initial evaluation, provide pt/family education and to maximize pt's level of independence in the home and community environment.     Patient's spiritual, cultural, and educational needs considered and patient agreeable to plan of care and goals.     Education  Education was done with " Patient.           - pt education on HEP, PLAN OF CARE, and signs and symptoms as they relate to current dysfunction             Plan: Monitor response to tx. motor control training.    Goals:   Active       LTG       Pt will be independent with final HEP in order to DC to home program       Start:  03/18/25    Expected End:  05/13/25            Pt will reduce worst pain to 3/10 in order to ambulate properly       Start:  03/18/25    Expected End:  05/13/25            Pt will improve B hip abd strength to 4-/5 in order to improve functional mobility        Start:  03/18/25    Expected End:  05/13/25            Pt will improve hip ER strength to 4/5 in order to improve functional mobility        Start:  03/18/25    Expected End:  05/13/25                Zoraida Crowder, PT, DPT

## 2025-04-03 ENCOUNTER — CLINICAL SUPPORT (OUTPATIENT)
Dept: REHABILITATION | Facility: OTHER | Age: 80
End: 2025-04-03
Payer: MEDICARE

## 2025-04-03 DIAGNOSIS — R29.3 ABNORMAL POSTURE: ICD-10-CM

## 2025-04-03 DIAGNOSIS — G89.29 CHRONIC RIGHT-SIDED LOW BACK PAIN WITHOUT SCIATICA: Primary | ICD-10-CM

## 2025-04-03 DIAGNOSIS — M62.81 MUSCLE WEAKNESS: ICD-10-CM

## 2025-04-03 DIAGNOSIS — M54.50 CHRONIC RIGHT-SIDED LOW BACK PAIN WITHOUT SCIATICA: Primary | ICD-10-CM

## 2025-04-03 PROCEDURE — 97112 NEUROMUSCULAR REEDUCATION: CPT | Mod: PN | Performed by: PHYSICAL THERAPIST

## 2025-04-03 NOTE — PROGRESS NOTES
"  Outpatient Rehab    Physical Therapy Visit    Patient Name: Nelsy Mckenna  MRN: [id number]  YOB: 1945  Encounter Date: 4/3/2025    Therapy Diagnosis:   Encounter Diagnoses   Name Primary?    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    Visit # / Visits Authorized:  5 / 10  Date of Evaluation: 3/17/2025   Insurance Authorization Period: 3/20/2025 to 12/31/2025  Plan of Care Certification:  3/17/2025 to 5/13/2025     Time In: 1301   Time Out: 1359  Total Time: 58   Total Billable Time: 30           Subjective   feeling good today..  Pain reported as 1/10. R low back    Objective            Treatment:  Balance/Neuromuscular Re-Education  NMR 1: Femoral nerve glide x20  NMR 2: Pretzel 2x10 B 1#  NMR 3: bridge with lat press 2x10 B BTB thighs and GTB arms  NMR 4: clamshell lv 2 2x10 B  NMR 5: lumbar lock hip ext 3x15 B RTB  NMR 6: Lat pull down c core contraction orange sports cord 3x10  NMR 7: Sit to stand 18" box 3x10 10#  NMR 8: SLR pilates ring TrA 2x15 B  NMR 9: Prone hip ext c multidi actvation 3x10 B    Time Entry(in minutes):  Neuromuscular Re-Education Time Entry: 58    Assessment & Plan   Assessment: Nelsy presents with improvement in back pain. She was slightly progressed with increased resistance in order to continue to assist with lumbosacral stabilization.       Patient will continue to benefit from skilled outpatient physical therapy to address the deficits listed in the problem list box on initial evaluation, provide pt/family education and to maximize pt's level of independence in the home and community environment.     Patient's spiritual, cultural, and educational needs considered and patient agreeable to plan of care and goals.     Education  Education was done " with Patient.           - pt education on HEP, PLAN OF CARE, and signs and symptoms as they relate to current dysfunction               Plan: Monitor response to tx. motor control training.    Goals:   Active       LTG       Pt will be independent with final HEP in order to DC to home program       Start:  03/18/25    Expected End:  05/13/25            Pt will reduce worst pain to 3/10 in order to ambulate properly       Start:  03/18/25    Expected End:  05/13/25            Pt will improve B hip abd strength to 4-/5 in order to improve functional mobility        Start:  03/18/25    Expected End:  05/13/25            Pt will improve hip ER strength to 4/5 in order to improve functional mobility        Start:  03/18/25    Expected End:  05/13/25                Zoraida Crowder, PT, DPT

## 2025-04-08 ENCOUNTER — HOSPITAL ENCOUNTER (OUTPATIENT)
Dept: RADIOLOGY | Facility: OTHER | Age: 80
Discharge: HOME | End: 2025-04-08
Attending: INTERNAL MEDICINE
Payer: MEDICARE

## 2025-04-08 DIAGNOSIS — M81.8 OTHER OSTEOPOROSIS, UNSPECIFIED PATHOLOGICAL FRACTURE PRESENCE: ICD-10-CM

## 2025-04-08 DIAGNOSIS — Z12.31 ENCOUNTER FOR SCREENING MAMMOGRAM FOR MALIGNANT NEOPLASM OF BREAST: ICD-10-CM

## 2025-04-08 PROCEDURE — 77080 DXA BONE DENSITY AXIAL: CPT | Mod: TC

## 2025-04-08 PROCEDURE — 77067 SCR MAMMO BI INCL CAD: CPT | Mod: 26,,, | Performed by: RADIOLOGY

## 2025-04-08 PROCEDURE — 77063 BREAST TOMOSYNTHESIS BI: CPT | Mod: 26,,, | Performed by: RADIOLOGY

## 2025-04-08 PROCEDURE — 77067 SCR MAMMO BI INCL CAD: CPT | Mod: TC

## 2025-04-08 PROCEDURE — 77080 DXA BONE DENSITY AXIAL: CPT | Mod: 26,,, | Performed by: STUDENT IN AN ORGANIZED HEALTH CARE EDUCATION/TRAINING PROGRAM

## 2025-04-10 ENCOUNTER — CLINICAL SUPPORT (OUTPATIENT)
Dept: REHABILITATION | Facility: OTHER | Age: 80
End: 2025-04-10
Payer: MEDICARE

## 2025-04-10 DIAGNOSIS — G89.29 CHRONIC RIGHT-SIDED LOW BACK PAIN WITHOUT SCIATICA: Primary | ICD-10-CM

## 2025-04-10 DIAGNOSIS — M54.50 CHRONIC RIGHT-SIDED LOW BACK PAIN WITHOUT SCIATICA: Primary | ICD-10-CM

## 2025-04-10 DIAGNOSIS — R29.3 ABNORMAL POSTURE: ICD-10-CM

## 2025-04-10 DIAGNOSIS — M62.81 MUSCLE WEAKNESS: ICD-10-CM

## 2025-04-10 PROCEDURE — 97140 MANUAL THERAPY 1/> REGIONS: CPT | Mod: PN | Performed by: PHYSICAL THERAPIST

## 2025-04-10 PROCEDURE — 97112 NEUROMUSCULAR REEDUCATION: CPT | Mod: PN | Performed by: PHYSICAL THERAPIST

## 2025-04-10 NOTE — PROGRESS NOTES
"  Outpatient Rehab    Physical Therapy Visit    Patient Name: Nelsy Mckenna  MRN: [id number]  YOB: 1945  Encounter Date: 4/10/2025    Therapy Diagnosis:   Encounter Diagnoses   Name Primary?    Chronic right-sided low back pain without sciatica Yes    Abnormal posture     Muscle weakness              Physician: Moo Hernandez MD    Physician Orders: Eval and Treat  Medical Diagnosis: Other intervertebral disc degeneration, lumbar region with discogenic back pain only    Visit # / Visits Authorized:  6 / 10  Date of Evaluation: 3/17/2025   Insurance Authorization Period: 3/20/2025 to 12/31/2025  Plan of Care Certification:  3/17/2025 to 5/13/2025     Time In: 1001   Time Out: 1100  Total Time: 59   Total Billable Time: 45           Subjective   Only having the slightest bit of back pain.  Pain reported as 1/10. R low back    Objective            Treatment:  Manual Therapy  MT 3: Sidelying Lumbar gapping mobilization grade 3-4  MT 4: pt education  Balance/Neuromuscular Re-Education  NMR 2: Pretzel 2x10 B 2#  NMR 3: bridge with lat press 2x10 B BTB thighs and GTB arms  NMR 4: clamshell lv 2 2x10 B  NMR 5: lumbar lock hip ext 3x15 B RTB  NMR 7: Sit to stand 18" box 3x10 10#  NMR 8: SLR pilates ring TrA 2x15 B  NMR 9: Prone hip ext c multidi actvation 3x10 B    Time Entry(in minutes):  Manual Therapy Time Entry: 10  Neuromuscular Re-Education Time Entry: 46    Assessment & Plan   Assessment: Nelsy continues to show no signs of nerve involvement recently. She continues to have LBP but this may be due to scoliosis and it is unclear if changes will be made. Strength of lumbosacral and hip region will continue to be beneficial to keep pain from returning.       Patient will continue to benefit from skilled outpatient physical therapy to address the deficits listed in the problem list box on initial evaluation, provide pt/family education and to maximize pt's level of independence in the home and community environment. "     Patient's spiritual, cultural, and educational needs considered and patient agreeable to plan of care and goals.     Education  Education was done with Patient.           - pt education on HEP, PLAN OF CARE, and signs and symptoms as they relate to current dysfunction                 Plan: Monitor response to tx. motor control training.    Goals:   Active       LTG       Pt will be independent with final HEP in order to DC to home program       Start:  03/18/25    Expected End:  05/13/25            Pt will reduce worst pain to 3/10 in order to ambulate properly       Start:  03/18/25    Expected End:  05/13/25            Pt will improve B hip abd strength to 4-/5 in order to improve functional mobility        Start:  03/18/25    Expected End:  05/13/25            Pt will improve hip ER strength to 4/5 in order to improve functional mobility        Start:  03/18/25    Expected End:  05/13/25                Zoraida Crowder, PT, DPT

## 2025-04-24 ENCOUNTER — CLINICAL SUPPORT (OUTPATIENT)
Dept: REHABILITATION | Facility: OTHER | Age: 80
End: 2025-04-24
Payer: MEDICARE

## 2025-04-24 DIAGNOSIS — M54.50 CHRONIC RIGHT-SIDED LOW BACK PAIN WITHOUT SCIATICA: Primary | ICD-10-CM

## 2025-04-24 DIAGNOSIS — G89.29 CHRONIC RIGHT-SIDED LOW BACK PAIN WITHOUT SCIATICA: Primary | ICD-10-CM

## 2025-04-24 DIAGNOSIS — M62.81 MUSCLE WEAKNESS: ICD-10-CM

## 2025-04-24 DIAGNOSIS — R29.3 ABNORMAL POSTURE: ICD-10-CM

## 2025-04-24 PROCEDURE — 97110 THERAPEUTIC EXERCISES: CPT | Mod: PN | Performed by: PHYSICAL THERAPIST

## 2025-04-24 PROCEDURE — 97530 THERAPEUTIC ACTIVITIES: CPT | Mod: PN | Performed by: PHYSICAL THERAPIST

## 2025-04-24 PROCEDURE — 97112 NEUROMUSCULAR REEDUCATION: CPT | Mod: PN | Performed by: PHYSICAL THERAPIST

## 2025-04-24 NOTE — PROGRESS NOTES
"  Outpatient Rehab    Physical Therapy Visit    Patient Name: Nelsy Mckenna  MRN: [id number]  YOB: 1945  Encounter Date: 4/24/2025    Therapy Diagnosis:   Encounter Diagnoses   Name Primary?    Chronic right-sided low back pain without sciatica Yes    Abnormal posture     Muscle weakness        Physician: Moo Hernandez MD    Physician Orders: Eval and Treat  Medical Diagnosis: Other intervertebral disc degeneration, lumbar region with discogenic back pain only    Visit # / Visits Authorized:  7 / 10  Date of Evaluation: 3/17/2025   Insurance Authorization Period: 3/20/2025 to 12/31/2025  Plan of Care Certification:  3/17/2025 to 5/13/2025     Time In: 1401   Time Out: 1500  Total Time: 59   Total Billable Time: 59           Subjective   Having a bit more pain in the back today than normal..  Pain reported as 3/10. R low back    Objective            Treatment:  Therapeutic Exercise  TE 1: Sidelying scoliosis stretch c pillow under R side lumbar region 2x20 5"  TE 2: pt education  Balance/Neuromuscular Re-Education  NMR 2: Pretzel 2x10 B 2#  NMR 4: clamshell RTB 2x10 B  NMR 5: Hip ext at shuttle 15# 3x8 B  NMR 8: SLR pilates ring TrA 2x15 B 1#  Therapeutic Activity  TA 1: Sit to stand 18" box 3x10 no weight today, too tired    Time Entry(in minutes):  Neuromuscular Re-Education Time Entry: 40  Therapeutic Activity Time Entry: 8  Therapeutic Exercise Time Entry: 10    Assessment & Plan   Assessment: Nelsy presents with hypertonicity of the R lumbar paraspinals. Sidelying stretch reduced pain and was provided to pt as HEP to prevent increased pain at home. She was progressed slighlty today which resulted in increased fatigue. PT to monitor response to tx.       Patient will continue to benefit from skilled outpatient physical therapy to address the deficits listed in the problem list box on initial evaluation, provide pt/family education and to maximize pt's level of independence in the home and community " environment.     Patient's spiritual, cultural, and educational needs considered and patient agreeable to plan of care and goals.     Education  Education was done with Patient.           - pt education on HEP, PLAN OF CARE, and signs and symptoms as they relate to current dysfunction                   Plan: Monitor response to tx. motor control training.    Goals:   Active       LTG       Pt will be independent with final HEP in order to DC to home program       Start:  03/18/25    Expected End:  05/13/25            Pt will reduce worst pain to 3/10 in order to ambulate properly       Start:  03/18/25    Expected End:  05/13/25            Pt will improve B hip abd strength to 4-/5 in order to improve functional mobility        Start:  03/18/25    Expected End:  05/13/25            Pt will improve hip ER strength to 4/5 in order to improve functional mobility        Start:  03/18/25    Expected End:  05/13/25                Zoraida Crowder, PT, DPT

## 2025-04-29 ENCOUNTER — CLINICAL SUPPORT (OUTPATIENT)
Dept: REHABILITATION | Facility: OTHER | Age: 80
End: 2025-04-29
Payer: MEDICARE

## 2025-04-29 DIAGNOSIS — G89.29 CHRONIC RIGHT-SIDED LOW BACK PAIN WITHOUT SCIATICA: Primary | ICD-10-CM

## 2025-04-29 DIAGNOSIS — M62.81 MUSCLE WEAKNESS: ICD-10-CM

## 2025-04-29 DIAGNOSIS — M54.50 CHRONIC RIGHT-SIDED LOW BACK PAIN WITHOUT SCIATICA: Primary | ICD-10-CM

## 2025-04-29 DIAGNOSIS — R29.3 ABNORMAL POSTURE: ICD-10-CM

## 2025-04-29 PROCEDURE — 97110 THERAPEUTIC EXERCISES: CPT | Mod: PN | Performed by: PHYSICAL THERAPIST

## 2025-04-29 PROCEDURE — 97112 NEUROMUSCULAR REEDUCATION: CPT | Mod: PN | Performed by: PHYSICAL THERAPIST

## 2025-04-29 NOTE — PROGRESS NOTES
"  Outpatient Rehab    Physical Therapy Visit    Patient Name: Nelsy Mckenna  MRN: [id number]  YOB: 1945  Encounter Date: 4/29/2025    Therapy Diagnosis:   Encounter Diagnoses   Name Primary?    Chronic right-sided low back pain without sciatica Yes    Abnormal posture     Muscle weakness          Physician: Moo Hernandez MD    Physician Orders: Eval and Treat  Medical Diagnosis: Other intervertebral disc degeneration, lumbar region with discogenic back pain only    Visit # / Visits Authorized:  8 / 10  Date of Evaluation: 3/17/2025   Insurance Authorization Period: 3/20/2025 to 12/31/2025  Plan of Care Certification:  3/17/2025 to 5/13/2025     Time In: 1501   Time Out: 1550  Total Time: 49   Total Billable Time: 30           Subjective   doing great, thinks the stretch that she was given before is helping whenver she starts to have the discomfort..  Pain reported as 2/10. R low back    Objective            Treatment:  Therapeutic Exercise  TE 1: Sidelying scoliosis stretch c pillow under R side lumbar region 2x20 5"  TE 2: pt education  TE 3: Hip ext at shuttle 15# 3x8 B  TE 4: Shuttle double leg 37.4# 3x10  Balance/Neuromuscular Re-Education  NMR 2: Pretzel 2x10 B 2#  NMR 3: bridge with lat press 2x10 B BTB thighs and GTB arms  NMR 4: clamshell RTB 2x10 B  NMR 6: Lat pull down c core contraction orange sports cord 3x10    Time Entry(in minutes):  Neuromuscular Re-Education Time Entry: 30  Therapeutic Exercise Time Entry: 19    Assessment & Plan   Assessment: Nelsy presents to PT today with overall improvement and the ability to address pain when it arises. She is progressing in strength but does continue to fatigue with each progression. PT to re-assess next visit.       Patient will continue to benefit from skilled outpatient physical therapy to address the deficits listed in the problem list box on initial evaluation, provide pt/family education and to maximize pt's level of independence in the home and " community environment.     Patient's spiritual, cultural, and educational needs considered and patient agreeable to plan of care and goals.     Education  Education was done with Patient.           - pt education on HEP, PLAN OF CARE, and signs and symptoms as they relate to current dysfunction                     Plan: Monitor response to tx. motor control training.    Goals:   Active       LTG       Pt will be independent with final HEP in order to DC to home program       Start:  03/18/25    Expected End:  05/13/25            Pt will reduce worst pain to 3/10 in order to ambulate properly       Start:  03/18/25    Expected End:  05/13/25            Pt will improve B hip abd strength to 4-/5 in order to improve functional mobility        Start:  03/18/25    Expected End:  05/13/25            Pt will improve hip ER strength to 4/5 in order to improve functional mobility        Start:  03/18/25    Expected End:  05/13/25                Zoraida Crowder, PT, DPT

## 2025-05-22 ENCOUNTER — OFFICE VISIT (OUTPATIENT)
Dept: UROGYNECOLOGY | Facility: CLINIC | Age: 80
End: 2025-05-22
Payer: MEDICARE

## 2025-05-22 VITALS
DIASTOLIC BLOOD PRESSURE: 73 MMHG | HEART RATE: 69 BPM | BODY MASS INDEX: 20.54 KG/M2 | SYSTOLIC BLOOD PRESSURE: 182 MMHG | WEIGHT: 115.94 LBS | HEIGHT: 63 IN

## 2025-05-22 DIAGNOSIS — R39.15 URINARY URGENCY: Primary | ICD-10-CM

## 2025-05-22 DIAGNOSIS — R35.0 URINARY FREQUENCY: ICD-10-CM

## 2025-05-22 DIAGNOSIS — R35.1 NOCTURIA: ICD-10-CM

## 2025-05-22 PROCEDURE — 3288F FALL RISK ASSESSMENT DOCD: CPT | Mod: CPTII,S$GLB,, | Performed by: OBSTETRICS & GYNECOLOGY

## 2025-05-22 PROCEDURE — 1101F PT FALLS ASSESS-DOCD LE1/YR: CPT | Mod: CPTII,S$GLB,, | Performed by: OBSTETRICS & GYNECOLOGY

## 2025-05-22 PROCEDURE — 3078F DIAST BP <80 MM HG: CPT | Mod: CPTII,S$GLB,, | Performed by: OBSTETRICS & GYNECOLOGY

## 2025-05-22 PROCEDURE — 1126F AMNT PAIN NOTED NONE PRSNT: CPT | Mod: CPTII,S$GLB,, | Performed by: OBSTETRICS & GYNECOLOGY

## 2025-05-22 PROCEDURE — 3077F SYST BP >= 140 MM HG: CPT | Mod: CPTII,S$GLB,, | Performed by: OBSTETRICS & GYNECOLOGY

## 2025-05-22 PROCEDURE — 1160F RVW MEDS BY RX/DR IN RCRD: CPT | Mod: CPTII,S$GLB,, | Performed by: OBSTETRICS & GYNECOLOGY

## 2025-05-22 PROCEDURE — 99999 PR PBB SHADOW E&M-EST. PATIENT-LVL III: CPT | Mod: PBBFAC,,, | Performed by: OBSTETRICS & GYNECOLOGY

## 2025-05-22 PROCEDURE — 1159F MED LIST DOCD IN RCRD: CPT | Mod: CPTII,S$GLB,, | Performed by: OBSTETRICS & GYNECOLOGY

## 2025-05-22 PROCEDURE — 99213 OFFICE O/P EST LOW 20 MIN: CPT | Mod: S$GLB,,, | Performed by: OBSTETRICS & GYNECOLOGY

## 2025-05-22 NOTE — PROGRESS NOTES
"Chief Complaint   Patient presents with    Follow-up        HPI: Patient is a 79 y.o. female  with stage 4 CKD, stage 2 incomplete uterovaginal prolapse and OAB/ urgency urinary incontinence presented today for a follow up visit. Last seen virtually on 25. She was taking 1/2 dose of Gemtesa without any effect but now taking a full dose.       Feels that the full dose of Gemtesa is "working pretty well." She does not wait to void. Will preventatively void if leaving her home. She voids every 2-3 hours. She wakes a lot over night but not due to the urge to urinate. However when she is awake she urinates even if she does not have the urge because she does not want to wake up later in the night with an urge. She denies urinary incontinence.     She did not do percutaneous tibial nerve stimulation as she did not feel that symptoms were not bad enough to devote the time to it.     She has noticed some constipation but she takes other medication that can cause constipation. Has not noticed any worsening of constipation since starting the Gemtesa.   Denies diarrhea and headaches.     For the constipation she has tried Miralax as needed.       REVIEW OF SYSTEMS:  A full 14-point ROS was performed and was significant for those mentioned in the HPI.    The following portions of the patient's history were reviewed and updated as appropriate: allergies, current medications, past medical history, past surgical history and problem list.    PHYSICAL EXAMINATION    Vitals:    25 1139   BP: (!) 182/73   Pulse: 69        General: Healthy in appearance, Well nourished, Affect Normal, NAD.    No visits with results within 1 Day(s) from this visit.   Latest known visit with results is:   Lab Visit on 2025   Component Date Value Ref Range Status    Folate 2025 33.6 (H)  4.0 - 24.0 ng/mL Final    Methlymalonic Acid 2025 0.25  <0.40 umol/L Final    TSH 2025 2.061  0.400 - 4.000 uIU/mL Final    " Cholesterol 2025 193  120 - 199 mg/dL Final    Triglycerides 2025 152 (H)  30 - 150 mg/dL Final    HDL 2025 72  40 - 75 mg/dL Final    LDL Cholesterol 2025 90.6  63.0 - 159.0 mg/dL Final    HDL/Cholesterol Ratio 2025 37.3  20.0 - 50.0 % Final    Total Cholesterol/HDL Ratio 2025 2.7  2.0 - 5.0 Final    Non-HDL Cholesterol 2025 121  mg/dL Final    Ferritin 2025 342 (H)  20.0 - 300.0 ng/mL Final    Iron 2025 79  30 - 160 ug/dL Final    Transferrin 2025 223  200 - 375 mg/dL Final    TIBC 2025 330  250 - 450 ug/dL Final    Saturated Iron 2025 24  20 - 50 % Final    Vitamin B-12 2025 549  210 - 950 pg/mL Final        ASSESSMENT & PLAN:  Urinary urgency    Urinary frequency    Nocturia       79 y.o. female  with stage 4 CKD, stage 2 incomplete uterovaginal prolapse and OAB/ urgency urinary incontinence presented today for a follow up visit. She is overall doing well with the Gemtesa. Cautioned about preventative voiding since she can train her bladder to start responding to smaller bladder volumes. Does not feel symptoms are bothersome enough at this time to pursue any advanced overactive bladder syndrome treatment options.     Does not desire surgery for her POP.     She will return in 6 months for re-evaluation.       All questions were answered today. The patient was encouraged to contact the office as needed with any additional questions or concerns.     Total time spent on visit was 20 minutes.  This includes face to face time and non-face to face time preparing to see the patient (eg, review of tests), Obtaining and/or reviewing separately obtained history, Documenting clinical information in the electronic or other health record, Independently interpreting resultsand communicating results to the patient/family/caregiver, or Care coordination.    Emily Ramirez MD

## 2025-08-26 ENCOUNTER — OFFICE VISIT (OUTPATIENT)
Dept: INTERNAL MEDICINE | Facility: CLINIC | Age: 80
End: 2025-08-26
Attending: INTERNAL MEDICINE
Payer: MEDICARE

## 2025-08-26 VITALS
HEIGHT: 63 IN | BODY MASS INDEX: 20.39 KG/M2 | DIASTOLIC BLOOD PRESSURE: 62 MMHG | SYSTOLIC BLOOD PRESSURE: 132 MMHG | HEART RATE: 74 BPM | OXYGEN SATURATION: 96 % | WEIGHT: 115.06 LBS

## 2025-08-26 DIAGNOSIS — G89.29 CHRONIC RIGHT-SIDED LOW BACK PAIN WITHOUT SCIATICA: ICD-10-CM

## 2025-08-26 DIAGNOSIS — Q61.3 PKD (POLYCYSTIC KIDNEY DISEASE): ICD-10-CM

## 2025-08-26 DIAGNOSIS — Z12.31 ENCOUNTER FOR SCREENING MAMMOGRAM FOR MALIGNANT NEOPLASM OF BREAST: ICD-10-CM

## 2025-08-26 DIAGNOSIS — E21.3 HYPERPARATHYROIDISM: ICD-10-CM

## 2025-08-26 DIAGNOSIS — K21.9 GERD WITHOUT ESOPHAGITIS: ICD-10-CM

## 2025-08-26 DIAGNOSIS — M81.6 LOCALIZED OSTEOPOROSIS, UNSPECIFIED PATHOLOGICAL FRACTURE PRESENCE: Primary | ICD-10-CM

## 2025-08-26 DIAGNOSIS — E55.9 VITAMIN D DEFICIENCY: ICD-10-CM

## 2025-08-26 DIAGNOSIS — N18.4 CKD (CHRONIC KIDNEY DISEASE) STAGE 4, GFR 15-29 ML/MIN: ICD-10-CM

## 2025-08-26 DIAGNOSIS — M54.50 CHRONIC RIGHT-SIDED LOW BACK PAIN WITHOUT SCIATICA: ICD-10-CM

## 2025-08-26 DIAGNOSIS — N32.81 OAB (OVERACTIVE BLADDER): ICD-10-CM

## 2025-08-26 PROCEDURE — 99215 OFFICE O/P EST HI 40 MIN: CPT | Mod: S$GLB,,, | Performed by: INTERNAL MEDICINE

## 2025-08-26 PROCEDURE — 3078F DIAST BP <80 MM HG: CPT | Mod: CPTII,S$GLB,, | Performed by: INTERNAL MEDICINE

## 2025-08-26 PROCEDURE — 1159F MED LIST DOCD IN RCRD: CPT | Mod: CPTII,S$GLB,, | Performed by: INTERNAL MEDICINE

## 2025-08-26 PROCEDURE — 3075F SYST BP GE 130 - 139MM HG: CPT | Mod: CPTII,S$GLB,, | Performed by: INTERNAL MEDICINE

## 2025-08-26 PROCEDURE — G2211 COMPLEX E/M VISIT ADD ON: HCPCS | Mod: S$GLB,,, | Performed by: INTERNAL MEDICINE

## 2025-08-26 PROCEDURE — 1126F AMNT PAIN NOTED NONE PRSNT: CPT | Mod: CPTII,S$GLB,, | Performed by: INTERNAL MEDICINE

## 2025-08-26 PROCEDURE — 1160F RVW MEDS BY RX/DR IN RCRD: CPT | Mod: CPTII,S$GLB,, | Performed by: INTERNAL MEDICINE

## 2025-08-26 PROCEDURE — 3288F FALL RISK ASSESSMENT DOCD: CPT | Mod: CPTII,S$GLB,, | Performed by: INTERNAL MEDICINE

## 2025-08-26 PROCEDURE — 1101F PT FALLS ASSESS-DOCD LE1/YR: CPT | Mod: CPTII,S$GLB,, | Performed by: INTERNAL MEDICINE

## 2025-08-26 PROCEDURE — 99999 PR PBB SHADOW E&M-EST. PATIENT-LVL IV: CPT | Mod: PBBFAC,,, | Performed by: INTERNAL MEDICINE

## 2025-08-27 ENCOUNTER — TELEPHONE (OUTPATIENT)
Dept: INTERNAL MEDICINE | Facility: CLINIC | Age: 80
End: 2025-08-27
Payer: MEDICARE

## 2025-09-02 ENCOUNTER — PATIENT MESSAGE (OUTPATIENT)
Dept: INTERNAL MEDICINE | Facility: CLINIC | Age: 80
End: 2025-09-02
Payer: MEDICARE

## 2025-09-02 ENCOUNTER — TELEPHONE (OUTPATIENT)
Dept: INTERNAL MEDICINE | Facility: CLINIC | Age: 80
End: 2025-09-02
Payer: MEDICARE

## 2025-09-02 DIAGNOSIS — M81.6 LOCALIZED OSTEOPOROSIS WITHOUT CURRENT PATHOLOGICAL FRACTURE: Primary | ICD-10-CM

## 2025-09-03 ENCOUNTER — TELEPHONE (OUTPATIENT)
Dept: INTERNAL MEDICINE | Facility: CLINIC | Age: 80
End: 2025-09-03
Payer: MEDICARE

## 2025-09-03 RX ORDER — DENOSUMAB 60 MG/ML
1 INJECTION SUBCUTANEOUS
Qty: 1 ML | Refills: 1 | Status: ACTIVE | OUTPATIENT
Start: 2025-09-03 | End: 2026-09-03

## 2025-09-04 ENCOUNTER — TELEPHONE (OUTPATIENT)
Dept: INTERNAL MEDICINE | Facility: CLINIC | Age: 80
End: 2025-09-04
Payer: MEDICARE